# Patient Record
Sex: FEMALE | Race: ASIAN | NOT HISPANIC OR LATINO | Employment: FULL TIME | URBAN - METROPOLITAN AREA
[De-identification: names, ages, dates, MRNs, and addresses within clinical notes are randomized per-mention and may not be internally consistent; named-entity substitution may affect disease eponyms.]

---

## 2018-01-10 ENCOUNTER — TELEPHONE (OUTPATIENT)
Dept: INTERNAL MEDICINE | Facility: CLINIC | Age: 68
End: 2018-01-10

## 2018-01-10 ENCOUNTER — OFFICE VISIT (OUTPATIENT)
Dept: INTERNAL MEDICINE | Facility: CLINIC | Age: 68
End: 2018-01-10
Payer: COMMERCIAL

## 2018-01-10 VITALS
HEIGHT: 61 IN | WEIGHT: 117.63 LBS | DIASTOLIC BLOOD PRESSURE: 62 MMHG | OXYGEN SATURATION: 99 % | TEMPERATURE: 99 F | HEART RATE: 57 BPM | SYSTOLIC BLOOD PRESSURE: 116 MMHG | BODY MASS INDEX: 22.21 KG/M2

## 2018-01-10 DIAGNOSIS — N81.9 FEMALE GENITAL PROLAPSE, UNSPECIFIED TYPE: ICD-10-CM

## 2018-01-10 DIAGNOSIS — R10.32 LLQ PAIN: Primary | ICD-10-CM

## 2018-01-10 DIAGNOSIS — R10.32 LLQ PAIN: ICD-10-CM

## 2018-01-10 DIAGNOSIS — Z00.00 ANNUAL PHYSICAL EXAM: Primary | ICD-10-CM

## 2018-01-10 DIAGNOSIS — M81.0 OSTEOPOROSIS, POST-MENOPAUSAL: ICD-10-CM

## 2018-01-10 LAB
BACTERIA #/AREA URNS AUTO: ABNORMAL /HPF
BILIRUB UR QL STRIP: NEGATIVE
CAOX CRY UR QL COMP ASSIST: ABNORMAL
CLARITY UR REFRACT.AUTO: ABNORMAL
COLOR UR AUTO: YELLOW
GLUCOSE UR QL STRIP: NEGATIVE
HGB UR QL STRIP: NEGATIVE
KETONES UR QL STRIP: ABNORMAL
LEUKOCYTE ESTERASE UR QL STRIP: ABNORMAL
MICROSCOPIC COMMENT: ABNORMAL
NITRITE UR QL STRIP: NEGATIVE
PH UR STRIP: 5 [PH] (ref 5–8)
PROT UR QL STRIP: NEGATIVE
RBC #/AREA URNS AUTO: 1 /HPF (ref 0–4)
SP GR UR STRIP: 1.03 (ref 1–1.03)
SQUAMOUS #/AREA URNS AUTO: 0 /HPF
URN SPEC COLLECT METH UR: ABNORMAL
UROBILINOGEN UR STRIP-ACNC: NEGATIVE EU/DL
WBC #/AREA URNS AUTO: 10 /HPF (ref 0–5)

## 2018-01-10 PROCEDURE — 81001 URINALYSIS AUTO W/SCOPE: CPT

## 2018-01-10 PROCEDURE — 99999 PR PBB SHADOW E&M-EST. PATIENT-LVL IV: CPT | Mod: PBBFAC,,, | Performed by: INTERNAL MEDICINE

## 2018-01-10 PROCEDURE — 99397 PER PM REEVAL EST PAT 65+ YR: CPT | Mod: 25,S$GLB,, | Performed by: INTERNAL MEDICINE

## 2018-01-10 PROCEDURE — 90471 IMMUNIZATION ADMIN: CPT | Mod: S$GLB,,, | Performed by: INTERNAL MEDICINE

## 2018-01-10 PROCEDURE — 90670 PCV13 VACCINE IM: CPT | Mod: S$GLB,,, | Performed by: INTERNAL MEDICINE

## 2018-01-10 RX ORDER — ALENDRONATE SODIUM 70 MG/1
70 TABLET ORAL
Qty: 4 TABLET | Refills: 11 | Status: SHIPPED | OUTPATIENT
Start: 2018-01-10 | End: 2019-01-23 | Stop reason: SDUPTHER

## 2018-01-10 NOTE — PROGRESS NOTES
Subjective:       Patient ID: Luly Lora is a 67 y.o. female.    Chief Complaint: abd pain, other concerns  HPI   C/o pain LLQ beginning more than 6 mo ago.  Not severe.  3/10.  Intermittent.  A couple of times was assoc with fever.  Pain always occurred during weekend and not severe enough to see ED care.  She has had pain less than monthly.  Episodes usually last a day to a few days.  Not assoc with change in BM's.  Tends to have irreg bm's.  Denies constipation.  No n,v, wt loss.no blood in stool.  Last colonoscopy in 2010.      She had recent labs at [facility], as she needed labs for insurance.    intracerebral hemorrhage 1998, etiology unclear.    H/o pelvic  And sacral fracture after a fall while playing [employer] in 2014.   dexa showed osteoporosis in 2016, but she declined meds.   Hip t score -3.3.  Femoral neck  -3.8.  Review of Systems   Constitutional: Positive for fever. Negative for unexpected weight change.   HENT: Negative for congestion and postnasal drip.    Respiratory: Negative for chest tightness, shortness of breath and wheezing.    Cardiovascular: Negative for chest pain and leg swelling.   Gastrointestinal: Positive for abdominal pain. Negative for anal bleeding, constipation, diarrhea, nausea and vomiting.        NO dysphagia.   Genitourinary: Negative for dysuria, frequency, hematuria and urgency.   Musculoskeletal: Negative for arthralgias and myalgias.   Skin: Negative for rash.   Neurological: Negative for headaches.   Psychiatric/Behavioral: Negative for dysphoric mood and sleep disturbance. The patient is not nervous/anxious.        Objective:      Physical Exam   Constitutional: She is oriented to person, place, and time. She appears well-developed and well-nourished. No distress.   HENT:   Head: Normocephalic and atraumatic.   Right Ear: External ear normal.   Left Ear: External ear normal.   Nose: Nose normal.   Mouth/Throat: Oropharynx is clear and moist.   Eyes: Conjunctivae and EOM are  normal. Pupils are equal, round, and reactive to light. Right eye exhibits no discharge. Left eye exhibits no discharge. No scleral icterus.   Neck: Normal range of motion. Neck supple. No JVD present. No thyromegaly present.   Cardiovascular: Normal rate, regular rhythm and normal heart sounds.  Exam reveals no gallop.    No murmur heard.  Pulmonary/Chest: Effort normal and breath sounds normal. No respiratory distress. She has no wheezes. She has no rales.   Abdominal: Soft. Bowel sounds are normal. She exhibits no distension and no mass. There is no tenderness. There is no rebound and no guarding.   Genitourinary: No breast tenderness, discharge or bleeding.   Musculoskeletal: Normal range of motion. She exhibits no edema or tenderness.   Lymphadenopathy:     She has no cervical adenopathy.   Neurological: She is alert and oriented to person, place, and time. No cranial nerve deficit. Coordination normal.   Skin: Skin is warm and dry. No rash noted.   Psychiatric: She has a normal mood and affect. Her behavior is normal. Judgment and thought content normal.         Labs at [facility] 10/17.   chol 200, hdl 60 tg 72, ldl 124.  Glu 85, bun 14,  Cr .62  egfr 95  Na 142  l 4.2cl 105  Ab 4.2liver enzymes normal  Cbc nl  hgm 13.1, hct 39.3plt 198  tsh 3.44  Assessment:       1. Annual physical exam    2. LLQ pain    3. Osteoporosis, post-menopausal    4. Female genital prolapse, unspecified type        Plan:       Luly was seen today for abdominal pain and annual exam.    Diagnoses and all orders for this visit:    Annual physical exam  -     Hepatitis C antibody; Future  -     Creatinine, serum; Future    LLQ pain  -     CT Abdomen Pelvis W Wo Contrast; Future  -     Urinalysis; Future    Osteoporosis, post-menopausal  -     Vitamin D; Future    Female genital prolapse, unspecified type  -     Ambulatory consult to Urogynecology    Other orders  -     (In Office Administered) Pneumococcal Conjugate Vaccine (13 Valent)  (IM)  -     BEGIN alendronate (FOSAMAX) 70 MG tablet; Take 1 tablet (70 mg total) by mouth every 7 days.       She defers MG til October.

## 2018-01-10 NOTE — PATIENT INSTRUCTIONS
Recommended therapies are daily weight bearing exercise,  Calcium 600 mg twice a day, and Vit D 5000 iu daily.

## 2018-01-11 ENCOUNTER — LAB VISIT (OUTPATIENT)
Dept: LAB | Facility: HOSPITAL | Age: 68
End: 2018-01-11
Attending: INTERNAL MEDICINE
Payer: COMMERCIAL

## 2018-01-11 DIAGNOSIS — Z00.00 ANNUAL PHYSICAL EXAM: ICD-10-CM

## 2018-01-11 DIAGNOSIS — M81.0 OSTEOPOROSIS, POST-MENOPAUSAL: ICD-10-CM

## 2018-01-11 LAB
25(OH)D3+25(OH)D2 SERPL-MCNC: 75 NG/ML
CREAT SERPL-MCNC: 0.7 MG/DL
EST. GFR  (AFRICAN AMERICAN): >60 ML/MIN/1.73 M^2
EST. GFR  (NON AFRICAN AMERICAN): >60 ML/MIN/1.73 M^2
HCV AB SERPL QL IA: NEGATIVE

## 2018-01-11 PROCEDURE — 86803 HEPATITIS C AB TEST: CPT

## 2018-01-11 PROCEDURE — 36415 COLL VENOUS BLD VENIPUNCTURE: CPT

## 2018-01-11 PROCEDURE — 82306 VITAMIN D 25 HYDROXY: CPT

## 2018-01-11 PROCEDURE — 82565 ASSAY OF CREATININE: CPT

## 2018-01-12 ENCOUNTER — HOSPITAL ENCOUNTER (OUTPATIENT)
Dept: RADIOLOGY | Facility: HOSPITAL | Age: 68
Discharge: HOME | End: 2018-01-12
Attending: INTERNAL MEDICINE
Payer: COMMERCIAL

## 2018-01-12 DIAGNOSIS — R10.32 LLQ PAIN: ICD-10-CM

## 2018-01-12 PROCEDURE — 74177 CT ABD & PELVIS W/CONTRAST: CPT | Mod: 26,,, | Performed by: RADIOLOGY

## 2018-01-12 PROCEDURE — 25500020 PHARM REV CODE 255: Performed by: INTERNAL MEDICINE

## 2018-01-12 PROCEDURE — 74177 CT ABD & PELVIS W/CONTRAST: CPT | Mod: TC

## 2018-01-12 RX ADMIN — IOHEXOL 75 ML: 350 INJECTION, SOLUTION INTRAVENOUS at 07:01

## 2018-01-15 DIAGNOSIS — R93.89 ABNORMAL CT SCAN: Primary | ICD-10-CM

## 2018-01-16 ENCOUNTER — TELEPHONE (OUTPATIENT)
Dept: INTERNAL MEDICINE | Facility: CLINIC | Age: 68
End: 2018-01-16

## 2018-01-23 ENCOUNTER — INITIAL CONSULT (OUTPATIENT)
Dept: UROGYNECOLOGY | Facility: CLINIC | Age: 68
End: 2018-01-23
Payer: COMMERCIAL

## 2018-01-23 VITALS
WEIGHT: 116.63 LBS | HEIGHT: 61 IN | BODY MASS INDEX: 22.02 KG/M2 | DIASTOLIC BLOOD PRESSURE: 60 MMHG | SYSTOLIC BLOOD PRESSURE: 110 MMHG

## 2018-01-23 DIAGNOSIS — N39.43 POST-VOID DRIBBLING: ICD-10-CM

## 2018-01-23 DIAGNOSIS — N95.2 VAGINAL ATROPHY: ICD-10-CM

## 2018-01-23 DIAGNOSIS — N81.4 UTEROVAGINAL PROLAPSE: ICD-10-CM

## 2018-01-23 DIAGNOSIS — R39.15 URINARY URGENCY: ICD-10-CM

## 2018-01-23 DIAGNOSIS — R33.9 INCOMPLETE EMPTYING OF BLADDER: ICD-10-CM

## 2018-01-23 DIAGNOSIS — N81.4 UTERINE PROLAPSE: Primary | ICD-10-CM

## 2018-01-23 PROCEDURE — 99213 OFFICE O/P EST LOW 20 MIN: CPT | Performed by: OBSTETRICS & GYNECOLOGY

## 2018-01-23 PROCEDURE — 3008F BODY MASS INDEX DOCD: CPT | Mod: S$GLB,,, | Performed by: OBSTETRICS & GYNECOLOGY

## 2018-01-23 PROCEDURE — 57160 INSERT PESSARY/OTHER DEVICE: CPT | Mod: ,,, | Performed by: OBSTETRICS & GYNECOLOGY

## 2018-01-23 PROCEDURE — 87086 URINE CULTURE/COLONY COUNT: CPT

## 2018-01-23 PROCEDURE — 99215 OFFICE O/P EST HI 40 MIN: CPT | Mod: 25,,, | Performed by: OBSTETRICS & GYNECOLOGY

## 2018-01-23 RX ORDER — ACETAMINOPHEN 500 MG
5000 TABLET ORAL DAILY
COMMUNITY

## 2018-01-23 NOTE — PROGRESS NOTES
[facility] - GYNECOLOGY  [address]  [place] [address]    Luly Lora  [id number]  1950 February 8, 2018    Consulting Physician: Elma Copeland MD   GYN: none  Primary M.D.: Elma Copeland MD    Chief Complaint   Patient presents with    Vaginal Prolapse    Abdominal Pain    Urinary Frequency     pt states she urinates every hour       HPI:   Ohs Peq [id number]     Question 1/21/2018  8:07 PM CST   Do you...    Usually experience pressure in the lower abdomen? Symptoms not present   Usually experience heaviness or dullness in the pelvic area? Symptoms not present   Usually have a bulge or something falling out that you can see or feel in your vaginal area? Symptoms present and they bother me quite a bit   Ever have to push on the vagina or around the rectum to complete a bowel movement? Symptoms not present   Usually experience a feeling of incomplete bladder emptying? Symptoms present and they bother me somewhat   Ever have to push up on a bulge in the vaginal area with your fingers to start or complete urination? Symptoms not present   Do you...    Feel you need to strain too hard to have a bowel movement? Symptoms not present   Feel you have not completely emptied your bowels at the end of a bowel movement?  Symptoms present and they bother me somewhat   Usually lose stool beyond your control if your stool is well formed? Symptoms not present   Usually lose stool beyond your control if your stool is loose? Symptoms present and they bother me somewhat   Usually lose gas from your rectum beyond your control? Symptoms not present   Usually have pain when you pass your stool? Symptoms not present   Experience a strong sense of urgency and have to rush to the bathroom to have a bowel movement? Symptoms not present   Does part of your bowel ever pass through the rectum and bulge outside during or after a bowel movement? Symptoms present and they bother me somewhat   Do you...     Usually experience  frequent urination? Symptoms present and they bother me somewhat   Usually experience urine leakage associated with a feeling of urgency, that is, a strong sensation of needing to go to the bathroom? Symptoms present and they bother me somewhat   Usually experience urine leakage related to coughing, sneezing or laughing? Symptoms not present   Usually experience small amounts of urine leakage (that is, drops)? Symptoms present and they bother me somewhat   Usually experience difficulty emptying your bladder? Symptoms present and they bother me somewhat   Usually experience pain or discomfort in the lower abdomen or genital region? Symptoms present and they bother me somewhat   POPDI  (range: 0 - 100) 25   CRADI (range: 0 - 100) 18.75   IGNACIO (range: 0 - 100) 41.66   TOTAL SCORE  (range: 0 - 300) 85.41   Ohs Peq Urogyn Hpi     Question 1/21/2018  8:21 PM CST   General Urogynecology: Are you experiencing the following?    Dysuria (painful urination) No   Nocturia:  waking up at night to empty your bladder  Yes   If you answered yes to the previous question, how many times does this happen per night? 1-2   Enuresis (urine loss during sleep) No   Dribbling urine after you urinate Yes   Hematuria (urine appears red) No   Type of stream Interrupted   Urinary frequency: How often a day are you going to the bathroom per day?  Less than 10   Urinary Tract Infections: How many Urinary Tract Infections have you had in the past year? I have not had a UTI in the past year   If you have had a UTI in the past year, what treatments have you had so far?  I have not had a UTI in the past year   Urinary Incontinence (General): Are you experiencing the following?    Past consultation for incontinence: Have you ever seen someone for the evaluation of incontinence? No   If you answered yes to the previous question, please select all the therapies you have tried.  N/a- I answered no to the previous question   Please note the effectiveness  "of the therapies.    Need to wear protection to keep clothes dry  No   If you answered yes to the previous question, please kuldeep the protection you use.  N/a- I answered no to the previous question   If you wear protection, how much wetness is typically on each pad? N/a- I do not wear protection   If you wear protection, how often do you have to change per day, if applicable?     Stress Symptoms: Are you experiencing the following?    Leakage of urine with cough, laugh and/or sneeze No   If you answered yes to the previous question, what is the frequency in days, weeks and/or months? Never   Leakage of urine with sex No   Leakage of urine with bending/ lifting No   Leakage of urine with briskly walking or jogging No   If you lose urine for any other reason not previously mentioned, please note it below, if applicable.     Urge Symptoms: Are you experiencing the following?    Urgency ("got to go" feeling) Yes   Urge: How frequently do you feel an urge to urinate (feeling like you "gotta go" to the bathroom and can't wait) Several times a week   Do you experience a leakage of urine when you have a feeling of urgency?  No   Leakage of urine when unaware No   Past use of anticholinergics (medications used to treat overactive bladder) No   If you answered yes to the previous question, please kuldeep the anticholinergics you have used:     Have you ever used Mirbetriq (aka Mirabegron)?  No   Prolapse Symptoms: Are you experiencing any of the following?     Falling out/ Bulging/ Heaviness in the vagina (past opening) x years; wears tight underwear Yes   Vaginal/ Abdominal Pain/ Pressure Yes   Need to strain/ Push to void No   Need to wait on the toilet before you void No   Unusual position to urinate (using your hands to push back the vaginal bulge) No   Sensation of incomplete emptying Yes--has to PV/DV to help   Past use of pessary device No   If you answered yes to the previous question, please list the devices you have " used below.     Bowel Symptoms: Are you experiencing any of the following?    Constipation No   Diarrhea  No   Hematochezia (bloody stool) No   Incomplete evacuation of stool Yes   Involuntary loss of formed stool No   Fecal smearing/urgency Yes   Involuntary loss of gas No   Vaginal Symptoms: Are you experiencing any of the following?     Abnormal vaginal bleeding  No   Vaginal dryness Yes   Sexually active  Yes   Dyspareunia (painful intercourse) Yes   Estrogen use  No   Ohs Peq Pelvic Pain Urogyn     Question 2018  8:22 PM CST   Are you experiencing pelvic pain?  No      Patient Hx        Past Medical History  Past Medical History:   Diagnosis Date    Brain bleed     Osteoporosis     Pelvic fracture     Stroke     intracerebral hemorrhage , unclear cause.  evaluated at [facility]   CVA: no residual issues    Past Surgical History  Past Surgical History:   Procedure Laterality Date    EYE SURGERY      ingrown eye lashes BL    WISDOM TOOTH EXTRACTION        Hysterectomy: No    Past Ob History     x 1.  C/s x 0.    Largest infant weight::  3250g.    no FAVD. yes episiotomy.      Gynecologic History  LMP: No LMP recorded. Patient is postmenopausal.  Age of menarche: 12 yo  Age of menopause: 56 yo.   Menstrual history: h/o normal  Pap test: , normal.  History of abnormal paps: No.  History of STIs:  No  Mammogram: Date of last: 10/16.  Result: Normal  Colonoscopy: Date of last: .  Result:  normal.  Repeat due:  .    DEXA:  Date of last: .  Result:  Osteoporosis, on meds.  Repeat due:  .     Family History  Family History   Problem Relation Age of Onset    Hypertension Mother     Heart disease Mother     Kidney failure Mother     Diabetes Mother     Parkinsonism Father     Hypertension Brother     No Known Problems Son     Hypertension Brother     Diabetes Brother     Breast cancer Neg Hx     Ovarian cancer Neg Hx     Cervical cancer Neg Hx     Endometrial  "cancer Neg Hx     Vaginal cancer Neg Hx     Asthma Neg Hx     Emphysema Neg Hx       Colon CA: No  Breast CA: No  GYN CA: No   CA: No    Social History  History   Smoking Status    Never Smoker   Smokeless Tobacco    Never Used     History   Alcohol Use    0.6 oz/week    1 Glasses of wine per week     Comment: occasionally   .    History   Drug Use No     The patient is .  Resides in [place] [address].  Employment status: currently employed at [employer]; teaching lab--runs.      Allergies  Review of patient's allergies indicates:  No Known Allergies    Medications  Current Outpatient Prescriptions on File Prior to Visit   Medication Sig Dispense Refill    alendronate (FOSAMAX) 70 MG tablet Take 1 tablet (70 mg total) by mouth every 7 days. 4 tablet 11     No current facility-administered medications on file prior to visit.        Review of Systems A 14 point ROS was reviewed with pertinent positives as noted above in the history of present illness.      Constitutional: negative  Eyes: negative  Endocrine: negative  Gastrointestinal: negative  Cardiovascular: negative  Respiratory: negative  Allergic/Immunologic: negative  Integumentary: negative  Psychiatric: negative  Musculoskeletal: negative   Ear/Nose/Throat: negative  Neurologic: negative  Genitourinary: SEE HPI  Hematologic/Lymphatic: negative   Breast: negative    Urogynecologic Exam  /60   Ht 5' 1" (1.549 m)   Wt 52.9 kg (116 lb 10 oz)   BMI 22.04 kg/m²     GENERAL APPEARANCE:  The patient is well-developed, well-nourished.   Neck:  Supple with no thyromegaly, no carotid bruits.  Heart:  Regular rate and rhythm, no murmurs, rubs or gallops.  Lungs:  Clear.  No CVA tenderness.  Abdomen:  Soft, nontender, nondistended, no hepatosplenomegaly.  Incisions:  none    PELVIC:    External genitalia:  Normal Bartholins, Skenes and labia bilaterally.    Urethra:  No caruncle, diverticulum or masses.  (+) hypermobility.    Vagina:  " Atrophy (+) , no bladder masses or tender, no discharge.    Cervix:  normal appearance  Uterus: normal  Adnexa: Not palpable.    POP-Q:  Aa 0; Ba 0; C +2; Ap 0; Bp 0; D -6.  Genital hiatus 3, perineal body 2, total vaginal length 11-12.    NEUROLOGIC:  Cranial nerves 2 through 12 intact.  Strength 5/5.  DTRs 2+ lower extremities.  S2 through 4 normal.  Sacral reflexes intact.    EXT: LOPEZ, 2+ pulses bilaterally, no C/C/E    COUGH STRESS TEST:  negative  KEGEL: 1 /5    RECTAL:    External:  Normal, (--) hemorrhoids, (--) dovetailing.   Internal: deferred    PVR: 120 mL    The patient was fit with #3 ring + support pessary.  She was able to tolerate the device comfortabley with bending, squatting, valsalva.  She was able to demonstrate independent removal and placement.  She tolerated the procedure well.      Impression    1. Uterine prolapse    2. Urinary urgency    3. Vaginal atrophy    4. Uterovaginal prolapse    5. Post-void dribbling    6. Incomplete emptying of bladder        Initial Plan  The patient was counseled regarding these issues. The patient was given a summary sheet containing each of these issues with possible options for evaluation and management. When appropriate, we also reviewed computer-generated diagrams specific to their diagnoses..  All questions were addressed to the patient's satisfaction.    1)  Stage 3 uterine prolapse:  --trial of pessary: #3 ring + support; get from [facility] outpatient pharmacy    PESSARY CARE: Remove pessary as frequently as nightly and as infrequently as every 2-3 weeks.  Remove before intercourse.  May need to remove before bowel movements if straining dislodges.   Wash with soap and water (no ).  Replace using small amount of water-based lubricant (like KY jelly) at end being introduced into vagina.      --surgical option: vaginal hysterectomy, uterosacral suspension, anterior/posterior repair  --would need ultrasound/bladder testing beforehand  --recheck PVR  on RTC with pessary    2)  Vaginal atrophy (dryness):    Use REPLENS or REFRESH OTC: 1/2 applicator full in vagina twice a week.      3)  Urinary urgency/post-void dribbling:  --trial of pessary   --Empty bladder every 3 hours.  Empty well: wait a minute, lean forward on toilet.    --Avoid dietary irritants (see sheet).  Keep diary x 3-5 days to determine your irritants.  --KEGELS: do 10 in AM and 10 in PM, holding each x 10 seconds.  When you feel urge to go, STOP, KEGEL, and when urge has passed, then go to bathroom.  Consider PT in future.    --URGE: consider medication in future.  Takes 2-4 weeks to see if will have effect.  For dry mouth: get sour, sugar free lozenge or gum.      4) elevated PVR:  --suspect due to prolapse  --recheck PVR on RTC with pessary    5)  RTC 6 weeks with NP for pessary check/PVR.      Approximately 45 min were spent in consult, 90 % in discussion.     Thank you for requesting consultation of your patient.  I look forward to participating in their care.    Shon Carlson  Female Pelvic Medicine and Reconstructive Surgery  [facility] [place], [place]

## 2018-01-23 NOTE — LETTER
January 31, 2018      Elma Copeland MD  [address]  [place] [address]           [facility] - Gynecology  [address]  [place] [address]  Phone: [phone]          Patient: Luly Lora   MR Number: [id number]   YOB: 1950   Date of Visit: 1/23/2018       Dear Dr. Elma Copeland:    Thank you for referring Luly Lora to me for evaluation. Attached you will find relevant portions of my assessment and plan of care.    If you have questions, please do not hesitate to call me. I look forward to following Luly Lora along with you.    Sincerely,    Shon Carlson MD    Enclosure  CC:  No Recipients    If you would like to receive this communication electronically, please contact [email or web address] or [phone] to request more information on [employer] Link access.    For providers and/or their staff who would like to refer a patient to [facility], please contact us through our one-stop-shop provider referral line, [facility] , at [phone].    If you feel you have received this communication in error or would no longer like to receive these types of communications, please e-mail [email or web address]

## 2018-01-23 NOTE — PATIENT INSTRUCTIONS
1)  Stage 3 uterine prolapse:  --trial of pessary: #3 ring + support; get from [facility] outpatient pharmacy    PESSARY CARE: Remove pessary as frequently as nightly and as infrequently as every 2-3 weeks.  Remove before intercourse.  May need to remove before bowel movements if straining dislodges.   Wash with soap and water (no ).  Replace using small amount of water-based lubricant (like KY jelly) at end being introduced into vagina.      --surgical option: vaginal hysterectomy, uterosacral suspension, anterior/posterior repair  --would need ultrasound/bladder testing beforehand  --recheck PVR on RTC with pessary    2)  Vaginal atrophy (dryness):    Use REPLENS or REFRESH OTC: 1/2 applicator full in vagina twice a week.      3)  Urinary urgency/post-void dribbling:  --trial of pessary   --Empty bladder every 3 hours.  Empty well: wait a minute, lean forward on toilet.    --Avoid dietary irritants (see sheet).  Keep diary x 3-5 days to determine your irritants.  --KEGELS: do 10 in AM and 10 in PM, holding each x 10 seconds.  When you feel urge to go, STOP, KEGEL, and when urge has passed, then go to bathroom.  Consider PT in future.    --URGE: consider medication in future.  Takes 2-4 weeks to see if will have effect.  For dry mouth: get sour, sugar free lozenge or gum.      4) elevated PVR:  --suspect due to prolapse  --recheck PVR on RTC with pessary    5)  RTC 6 weeks with NP for pessary check/PVR.    ----------------------------------------------    Bladder Irritants  Certain foods and drinks have been associated with worsening symptoms of urinary frequency, urgency, urge incontinence, or bladder pain. If you suffer from any of these conditions, you may wish to try eliminating one or more of these foods from your diet and see if your symptoms improve. If bladder symptoms are related to dietary factors, strict adherence to a diet thateliminates the food should bring marked relief in 10 days. Once you  are feeling better, you can begin to add foods back into your diet, one at a time. If symptoms return, you will be able to identify the irritant. As you add foods back to your diet it is very important that you drink significant amounts of water.    -----------------------------------------------------------------------------------------------  List of Common Bladder Irritants*  Alcoholic beverages  Apples and apple juice  Cantaloupe  Carbonated beverages  Chili and spicy foods  Chocolate  Citrus fruit  Coffee (including decaffeinated)  Cranberries and cranberry juice  Grapes  Guava  Milk Products: milk, cheese, cottage cheese, yogurt, ice cream  Peaches  Pineapple  Plums  Strawberries  Sugar especially artificial sweeteners, saccharin, aspartame, corn sweeteners, honey, fructose, sucrose, lactose  Tea  Tomatoes and tomato juice  Vitamin B complex  Vinegar  *Most people are not sensitive to ALL of these products; your goal is to find the foods that make YOUR symptoms worse.  ---------------------------------------------------------------------------------------------------    Low-acid fruit substitutions include apricots, papaya, pears and watermelon. Coffee drinkers can drink Kava or other lowacid instant drinks. Tea drinkers can substitute non-citrus herbal and sun brewed teas. Calcium carbonate co-buffered with calcium ascorbate can be substituted for Vitamin C. Prelief is a dietary supplement that works as an acid blocker for the bladder.    Where to get more information:        Overcoming Bladder Disorders by Latrice Davey and Deisy Shelton, 1990        You Dont Have to Live with Cystitis! By Shawnee Epstein, 1988  · http://www.urologymanagement.org/oab

## 2018-01-24 LAB — BACTERIA UR CULT: NO GROWTH

## 2018-01-25 ENCOUNTER — TELEPHONE (OUTPATIENT)
Dept: UROGYNECOLOGY | Facility: CLINIC | Age: 68
End: 2018-01-25

## 2018-01-25 NOTE — TELEPHONE ENCOUNTER
Please let patient know that I apologize that the [facility] pharmacy is not keeping that pessary in stock.  They were to supposed to have been, but maybe they ran out?  They should have ordered it for her, though, and should call her when it arrives.  Please let me know if this didn't happen.  I will also contact the pharmacy to make sure that they have them in stock in the future. Please apologize for any inconvenience she experienced.  Thanks!

## 2018-01-25 NOTE — TELEPHONE ENCOUNTER
DARELLI,    Pt stated the pharmacy did not have the pessary so she left the rx with them so they could order it. Informed pt I'll relay this message to Dr Carlson. Pt voiced understanding and call ended.

## 2018-01-25 NOTE — TELEPHONE ENCOUNTER
----- Message from Francy Salbador sent at 1/25/2018  3:01 PM CST -----  Contact: DINORA HUERTA [[id number]]  _x  1st Request  _  2nd Request  _  3rd Request        Who: DINORA HUERTA [[id number]]    Why: Patient states she would like a call back to discuss her pessary not being at the pharmacy.     What Number to Call Back: [phone]    When to Expect a call back: (Before the end of the day)   -- if call after 3:00 call back will be tomorrow.

## 2018-01-26 ENCOUNTER — TELEPHONE (OUTPATIENT)
Dept: UROGYNECOLOGY | Facility: CLINIC | Age: 68
End: 2018-01-26

## 2018-01-26 NOTE — TELEPHONE ENCOUNTER
----- Message from Shon Carlson MD sent at 1/25/2018  5:40 PM CST -----  Please let the patient know urine C&S was negative for infection.  Thanks!

## 2018-01-26 NOTE — TELEPHONE ENCOUNTER
Called pt and relayed message that we apologize that the [facility] pharmacy is not keeping that pessary in stock.  They were to supposed to have been, but maybe they ran out?  They should have ordered it for her, though, and should call her when it arrives.  Please let me know if this didn't happen.  I will also contact the pharmacy to make sure that they have them in stock in the future. Please apologize for any inconvenience she experienced.  Pt voiced understanding and call was ended.

## 2018-01-26 NOTE — TELEPHONE ENCOUNTER
Spoke with and relayed urine culture negative for infection, pt voiced understanding and call was ended.

## 2018-01-31 ENCOUNTER — OFFICE VISIT (OUTPATIENT)
Dept: PULMONOLOGY | Facility: CLINIC | Age: 68
End: 2018-01-31
Payer: COMMERCIAL

## 2018-01-31 VITALS
OXYGEN SATURATION: 98 % | DIASTOLIC BLOOD PRESSURE: 62 MMHG | WEIGHT: 118.19 LBS | SYSTOLIC BLOOD PRESSURE: 102 MMHG | RESPIRATION RATE: 12 BRPM | HEART RATE: 61 BPM | BODY MASS INDEX: 22.31 KG/M2 | HEIGHT: 61 IN

## 2018-01-31 DIAGNOSIS — J84.10 POSTINFLAMMATORY PULMONARY FIBROSIS: ICD-10-CM

## 2018-01-31 DIAGNOSIS — Z92.29 HISTORY OF BCG VACCINATION: ICD-10-CM

## 2018-01-31 DIAGNOSIS — J18.9 PNEUMONIA OF BOTH LOWER LOBES DUE TO INFECTIOUS ORGANISM: ICD-10-CM

## 2018-01-31 PROCEDURE — 99999 PR PBB SHADOW E&M-EST. PATIENT-LVL III: CPT | Mod: PBBFAC,,, | Performed by: INTERNAL MEDICINE

## 2018-01-31 PROCEDURE — 3008F BODY MASS INDEX DOCD: CPT | Mod: S$GLB,,, | Performed by: INTERNAL MEDICINE

## 2018-01-31 PROCEDURE — 99204 OFFICE O/P NEW MOD 45 MIN: CPT | Mod: S$GLB,,, | Performed by: INTERNAL MEDICINE

## 2018-01-31 NOTE — PATIENT INSTRUCTIONS
CBC, CMP, ESR, and CRP.  Spirometry and DLCO.  CT Chest.  Phone review, and decide on any further investigation   (sputum studies for AFB).

## 2018-01-31 NOTE — LETTER
January 31, 2018      Elma Copeland MD  [address]  [place] [address]           [facility]  [address]  [place] [address]  Phone: [phone]          Patient: Luly Lora   MR Number: [id number]   YOB: 1950   Date of Visit: 1/31/2018       Dear Dr. Elma Copeland:    Thank you for referring Luly Lora to me for evaluation. Attached you will find relevant portions of my assessment and plan of care.    If you have questions, please do not hesitate to call me. I look forward to following Luly Lora along with you.    Sincerely,    MARYCHUY Crews MD    Enclosure  CC:  No Recipients    If you would like to receive this communication electronically, please contact [email or web address] or [phone] to request more information on [employer] Link access.    For providers and/or their staff who would like to refer a patient to [facility], please contact us through our one-stop-shop provider referral line, [facility] , at [phone].    If you feel you have received this communication in error or would no longer like to receive these types of communications, please e-mail [email or web address]

## 2018-01-31 NOTE — PROGRESS NOTES
Subjective:       Patient ID: Luly Lora is a 67 y.o. female.    Chief Complaint: Abnormal Ct Scan    HPI Mrs. Lora is a 67-year-old nonsmoker who comes to follow up recent abnormalities   seen in a CT scan of the abdomen.  This study was done to investigate   intermittent abdominal pain.  The images through the lung bases were noted to   show areas of fibronodular abnormality.  Unfortunately, there are no previous   thoracic images available for comparison.    Mrs. Lora has not had any ongoing respiratory symptoms to correlate with the CT   findings.  She is not having fever, night sweats, or unexplained weight loss.    She does report a modest cough and intermittent mucus production.  She has not   had any wheezing or hemoptysis.    Mrs. Lora immigrated to this country from [place] in 1988.  She believes that she   was vaccinated for tuberculosis during childhood (? BCG).  As a result, she has    always done chest x-rays as an alternative to skin testing when being screened   for mycobacterial disease in the past.  She believes her chest x-ray has always been   normal.    Mrs. Lora is a lifelong nonsmoker.  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  She believes that her family history is   negative for lung diseases.    DATA:  I have reviewed the images from the CT scan of the abdomen done earlier   this month.  The cardiac silhouette is not enlarged.  There are bilateral   fibrotic and nodular abnormalities near the lung bases.  The most substantial   abnormalities appear to be in the right middle lobe and lingula.  There are no   obvious pleural abnormalities.  There are no previous images available   for comparison.      CB/IN  dd: 01/31/2018 19:29:49 (CST)  td: 02/01/2018 14:03:27 (CST)  Doc ID   #[id number]  Job ID #[id number]    CC:       Review of Systems   Constitutional: Negative for fever and fatigue.   HENT: Negative for postnasal drip, sinus pressure, voice change and congestion.     Respiratory: Positive for cough and sputum production. Negative for shortness of breath, wheezing and dyspnea on extertion.    Cardiovascular: Negative for chest pain and leg swelling.   Genitourinary: Negative for difficulty urinating.   Musculoskeletal: Negative for arthralgias and back pain.   Skin: Negative for rash.   Gastrointestinal: Negative for abdominal pain and acid reflux.   Neurological: Negative for dizziness and weakness.   Hematological: Negative for adenopathy.       Objective:      Physical Exam   Constitutional: She is oriented to person, place, and time. She appears well-developed and well-nourished.   HENT:   Head: Normocephalic.   Nose: Nose normal.   Mouth/Throat: No oropharyngeal exudate.   Neck: Normal range of motion. No JVD present. No tracheal deviation present. No thyromegaly present.   Cardiovascular: Normal rate, regular rhythm and normal heart sounds.    No murmur heard.  Pulmonary/Chest: Normal expansion. No stridor. She has no wheezes. She has no rhonchi. She has no rales. She exhibits no tenderness.   Abdominal: Soft. There is no tenderness.   Musculoskeletal: She exhibits no edema.   Lymphadenopathy:     She has no cervical adenopathy.   Neurological: She is alert and oriented to person, place, and time.   Skin: Skin is warm and dry. No rash noted. No erythema. Nails show no clubbing.   Psychiatric: She has a normal mood and affect.   Vitals reviewed.    Personal Diagnostic Review    No flowsheet data found.      Assessment:       1. Postinflammatory pulmonary fibrosis    2. Pneumonia of both lower lobes due to infectious organism    3. History of BCG vaccination        Outpatient Encounter Prescriptions as of 1/31/2018   Medication Sig Dispense Refill    alendronate (FOSAMAX) 70 MG tablet Take 1 tablet (70 mg total) by mouth every 7 days. 4 tablet 11    CALCIUM CARBONATE (CALCIUM 600 ORAL) Take by mouth.      cholecalciferol, vitamin D3, (VITAMIN D3) 5,000 unit Tab Take  5,000 Units by mouth once daily.      VIT A/VIT C/VIT E/ZINC/COPPER (ICAPS AREDS ORAL) Take by mouth.       No facility-administered encounter medications on file as of 1/31/2018.      Orders Placed This Encounter   Procedures    CT Chest Without Contrast     Standing Status:   Future     Standing Expiration Date:   1/31/2019    CBC auto differential     Standing Status:   Future     Number of Occurrences:   1     Standing Expiration Date:   1/31/2019    Comprehensive metabolic panel     Standing Status:   Future     Number of Occurrences:   1     Standing Expiration Date:   1/31/2019    Sedimentation rate, manual     Standing Status:   Future     Number of Occurrences:   1     Standing Expiration Date:   1/31/2019    C-reactive protein     Standing Status:   Future     Number of Occurrences:   1     Standing Expiration Date:   1/31/2019    Spirometry without bronchodilator     Standing Status:   Future     Standing Expiration Date:   1/31/2019    DLCO-Carbon Monoxide Diffusing Capacity     Standing Status:   Future     Standing Expiration Date:   1/31/2019     Plan:    CBC, CMP, ESR, and CRP.  Spirometry and DLCO.  CT Chest.  Phone review, and decide on any further investigation   (sputum studies for AFB).

## 2018-02-08 PROBLEM — N81.4 UTERINE PROLAPSE: Status: ACTIVE | Noted: 2018-01-10

## 2018-02-08 PROBLEM — N95.2 VAGINAL ATROPHY: Status: ACTIVE | Noted: 2018-02-08

## 2018-02-08 PROBLEM — N39.43 POST-VOID DRIBBLING: Status: ACTIVE | Noted: 2018-02-08

## 2018-02-08 PROBLEM — R33.9 INCOMPLETE EMPTYING OF BLADDER: Status: ACTIVE | Noted: 2018-02-08

## 2018-02-08 PROBLEM — R39.15 URINARY URGENCY: Status: ACTIVE | Noted: 2018-02-08

## 2018-02-14 ENCOUNTER — OFFICE VISIT (OUTPATIENT)
Dept: PULMONOLOGY | Facility: CLINIC | Age: 68
End: 2018-02-14
Payer: COMMERCIAL

## 2018-02-14 ENCOUNTER — HOSPITAL ENCOUNTER (OUTPATIENT)
Dept: RADIOLOGY | Facility: HOSPITAL | Age: 68
Discharge: HOME | End: 2018-02-14
Attending: INTERNAL MEDICINE
Payer: COMMERCIAL

## 2018-02-14 ENCOUNTER — HOSPITAL ENCOUNTER (OUTPATIENT)
Dept: PULMONOLOGY | Facility: CLINIC | Age: 68
Discharge: HOME | End: 2018-02-14
Payer: COMMERCIAL

## 2018-02-14 VITALS
SYSTOLIC BLOOD PRESSURE: 100 MMHG | WEIGHT: 119 LBS | DIASTOLIC BLOOD PRESSURE: 60 MMHG | BODY MASS INDEX: 22.47 KG/M2 | HEART RATE: 59 BPM | HEIGHT: 61 IN | RESPIRATION RATE: 12 BRPM | OXYGEN SATURATION: 95 %

## 2018-02-14 DIAGNOSIS — Z92.29 HISTORY OF BCG VACCINATION: ICD-10-CM

## 2018-02-14 DIAGNOSIS — J18.9 PNEUMONIA OF BOTH LOWER LOBES DUE TO INFECTIOUS ORGANISM: ICD-10-CM

## 2018-02-14 DIAGNOSIS — J84.10 POSTINFLAMMATORY PULMONARY FIBROSIS: Primary | ICD-10-CM

## 2018-02-14 DIAGNOSIS — J84.10 POSTINFLAMMATORY PULMONARY FIBROSIS: ICD-10-CM

## 2018-02-14 LAB
PRE FEV1 FVC: 81
PRE FEV1: 2.2
PRE FVC: 2.7
PREDICTED FEV1 FVC: 81
PREDICTED FEV1: 2.08
PREDICTED FVC: 2.61

## 2018-02-14 PROCEDURE — 71250 CT THORAX DX C-: CPT | Mod: TC

## 2018-02-14 PROCEDURE — 71250 CT THORAX DX C-: CPT | Mod: 26,,, | Performed by: RADIOLOGY

## 2018-02-14 PROCEDURE — 99214 OFFICE O/P EST MOD 30 MIN: CPT | Mod: 25,S$GLB,, | Performed by: INTERNAL MEDICINE

## 2018-02-14 PROCEDURE — 94729 DIFFUSING CAPACITY: CPT | Mod: S$GLB,,, | Performed by: INTERNAL MEDICINE

## 2018-02-14 PROCEDURE — 94010 BREATHING CAPACITY TEST: CPT | Mod: S$GLB,,, | Performed by: INTERNAL MEDICINE

## 2018-02-14 PROCEDURE — 3008F BODY MASS INDEX DOCD: CPT | Mod: S$GLB,,, | Performed by: INTERNAL MEDICINE

## 2018-02-14 PROCEDURE — 99999 PR PBB SHADOW E&M-EST. PATIENT-LVL III: CPT | Mod: PBBFAC,,, | Performed by: INTERNAL MEDICINE

## 2018-02-15 NOTE — PATIENT INSTRUCTIONS
Discussed CT findings and possible indolent lung infection (?FAYE), and indications for possible treatment.  In absence of active clinical symptoms benefit of further investigation/treatment is unclear.  Will plan to monitor with return visit in 3 months with repeat CBC, ESR, CRP, Spirometry, and DLCO.  Call/return sooner if new resp symptoms arise.

## 2018-02-15 NOTE — PROGRESS NOTES
Subjective:       Patient ID: Luly Lora is a 67 y.o. female.    Chief Complaint: Results    HPI REVIEW VISIT.    HISTORY OF PRESENT ILLNESS:  Ms. Lora returns to review the results from recent   studies done to investigate abnormalities seen in a recent CT scan.  This   was an abdominal study which revealed the incidental finding of fibronodular   abnormalities within the lung bases.    Today, Ms. Lora reports that she continues to feel well.  She is not having any   ongoing respiratory symptoms.  She believes that she may have had one or two   episodes of a low-grade fever lasting no more than a day during the past several   months.  Otherwise, she does not recognize any symptoms which might be   associated with a chronic infection.    Laboratory studies done at the time of her previous visit include a CBC,   sedimentation rate, C-reactive protein, and a chemistry profile.  These do not   show any significant abnormal findings.    Pulmonary function studies done today show the forced vital capacity is 2.70 L,   which is 104% of the expected value.  The FEV1 is 2.20 L, or 106% of predicted.    The ratio of these values is 81%.  The diffusion capacity is 13.8, which is 79%   of the expected value.  No previous studies are available for comparison.    I have reviewed the images from the CT scan of the chest done earlier today.    The cardiac silhouette is not enlarged.  There are no acute cardiovascular   abnormalities.  There are areas of tree-in-bud micro nodules scattered within   the mid lung zones bilaterally.  Once again demonstrated are linear opacities   and probable mild bronchiectasis involving the right middle lobe and lingula.    There do not appear to be any significant pleural effusions.      CB/HN  dd: 02/14/2018 21:16:49 (CST)  td: 02/15/2018 10:00:37 (CST)  Doc ID   #[id number]  Job ID #[id number]    CC:       Review of Systems    Objective:      Physical Exam  Personal Diagnostic Review    No flowsheet data  found.      Assessment:       1. Postinflammatory pulmonary fibrosis    2. History of BCG vaccination        Outpatient Encounter Prescriptions as of 2/14/2018   Medication Sig Dispense Refill    alendronate (FOSAMAX) 70 MG tablet Take 1 tablet (70 mg total) by mouth every 7 days. 4 tablet 11    CALCIUM CARBONATE (CALCIUM 600 ORAL) Take by mouth.      cholecalciferol, vitamin D3, (VITAMIN D3) 5,000 unit Tab Take 5,000 Units by mouth once daily.      VIT A/VIT C/VIT E/ZINC/COPPER (ICAPS AREDS ORAL) Take by mouth.       No facility-administered encounter medications on file as of 2/14/2018.      Orders Placed This Encounter   Procedures    CBC auto differential     Standing Status:   Future     Standing Expiration Date:   8/14/2018    C-reactive protein     Standing Status:   Future     Standing Expiration Date:   8/14/2018    Sedimentation rate, manual     Standing Status:   Future     Standing Expiration Date:   8/14/2018    Spirometry without bronchodilator     Standing Status:   Future     Standing Expiration Date:   8/14/2018    DLCO-Carbon Monoxide Diffusing Capacity     Standing Status:   Future     Standing Expiration Date:   8/14/2018     Plan:     Discussed CT findings and possible indolent lung infection (?FAYE), and indications for possible treatment.  In absence of active clinical symptoms benefit of further investigation/treatment is unclear.  Will plan to monitor with return visit in 3 months with repeat CBC, ESR, CRP, Spirometry, and DLCO.  Call/return sooner if new resp symptoms arise.

## 2018-02-22 ENCOUNTER — OFFICE VISIT (OUTPATIENT)
Dept: UROGYNECOLOGY | Facility: CLINIC | Age: 68
End: 2018-02-22
Payer: COMMERCIAL

## 2018-02-22 VITALS
DIASTOLIC BLOOD PRESSURE: 62 MMHG | WEIGHT: 115.5 LBS | BODY MASS INDEX: 21.81 KG/M2 | SYSTOLIC BLOOD PRESSURE: 96 MMHG | HEIGHT: 61 IN

## 2018-02-22 DIAGNOSIS — R33.9 INCOMPLETE EMPTYING OF BLADDER: ICD-10-CM

## 2018-02-22 DIAGNOSIS — Z46.89 PESSARY MAINTENANCE: ICD-10-CM

## 2018-02-22 DIAGNOSIS — N81.4 UTEROVAGINAL PROLAPSE: Primary | ICD-10-CM

## 2018-02-22 DIAGNOSIS — N95.2 VAGINAL ATROPHY: ICD-10-CM

## 2018-02-22 PROCEDURE — 99213 OFFICE O/P EST LOW 20 MIN: CPT | Mod: 25,SA,S$GLB, | Performed by: NURSE PRACTITIONER

## 2018-02-22 PROCEDURE — 3008F BODY MASS INDEX DOCD: CPT | Mod: S$GLB,,, | Performed by: NURSE PRACTITIONER

## 2018-02-22 PROCEDURE — 99999 PR PBB SHADOW E&M-EST. PATIENT-LVL III: CPT | Mod: PBBFAC,,, | Performed by: NURSE PRACTITIONER

## 2018-02-22 PROCEDURE — 57160 INSERT PESSARY/OTHER DEVICE: CPT | Mod: S$GLB,,, | Performed by: NURSE PRACTITIONER

## 2018-02-22 NOTE — PROGRESS NOTES
Urogyn follow up  02/22/2018  .  [facility] [address] [place] [address]    Luly Lora  [id number]  1950      Luly Lora is a 67 y.o.  here for a urogyn follow up.    Last HPI from 01/23/2018     HPI:   Ohs Peq [id number]      Question 1/21/2018  8:07 PM CST   Do you...     Usually experience pressure in the lower abdomen? Symptoms not present   Usually experience heaviness or dullness in the pelvic area? Symptoms not present   Usually have a bulge or something falling out that you can see or feel in your vaginal area? Symptoms present and they bother me quite a bit   Ever have to push on the vagina or around the rectum to complete a bowel movement? Symptoms not present   Usually experience a feeling of incomplete bladder emptying? Symptoms present and they bother me somewhat   Ever have to push up on a bulge in the vaginal area with your fingers to start or complete urination? Symptoms not present   Do you...     Feel you need to strain too hard to have a bowel movement? Symptoms not present   Feel you have not completely emptied your bowels at the end of a bowel movement?  Symptoms present and they bother me somewhat   Usually lose stool beyond your control if your stool is well formed? Symptoms not present   Usually lose stool beyond your control if your stool is loose? Symptoms present and they bother me somewhat   Usually lose gas from your rectum beyond your control? Symptoms not present   Usually have pain when you pass your stool? Symptoms not present   Experience a strong sense of urgency and have to rush to the bathroom to have a bowel movement? Symptoms not present   Does part of your bowel ever pass through the rectum and bulge outside during or after a bowel movement? Symptoms present and they bother me somewhat   Do you...      Usually experience frequent urination? Symptoms present and they bother me somewhat   Usually experience urine leakage associated with a  feeling of urgency, that is, a strong sensation of needing to go to the bathroom? Symptoms present and they bother me somewhat   Usually experience urine leakage related to coughing, sneezing or laughing? Symptoms not present   Usually experience small amounts of urine leakage (that is, drops)? Symptoms present and they bother me somewhat   Usually experience difficulty emptying your bladder? Symptoms present and they bother me somewhat   Usually experience pain or discomfort in the lower abdomen or genital region? Symptoms present and they bother me somewhat   POPDI  (range: 0 - 100) 25   CRADI (range: 0 - 100) 18.75   IGNACIO (range: 0 - 100) 41.66   TOTAL SCORE  (range: 0 - 300) 85.41   Ohs Peq Urogyn Hpi      Question 1/21/2018  8:21 PM CST   General Urogynecology: Are you experiencing the following?     Dysuria (painful urination) No   Nocturia:  waking up at night to empty your bladder  Yes   If you answered yes to the previous question, how many times does this happen per night? 1-2   Enuresis (urine loss during sleep) No   Dribbling urine after you urinate Yes   Hematuria (urine appears red) No   Type of stream Interrupted   Urinary frequency: How often a day are you going to the bathroom per day?  Less than 10   Urinary Tract Infections: How many Urinary Tract Infections have you had in the past year? I have not had a UTI in the past year   If you have had a UTI in the past year, what treatments have you had so far?  I have not had a UTI in the past year   Urinary Incontinence (General): Are you experiencing the following?     Past consultation for incontinence: Have you ever seen someone for the evaluation of incontinence? No   If you answered yes to the previous question, please select all the therapies you have tried.  N/a- I answered no to the previous question   Please note the effectiveness of the therapies.     Need to wear protection to keep clothes dry  No   If you answered yes to the previous  "question, please kuldeep the protection you use.  N/a- I answered no to the previous question   If you wear protection, how much wetness is typically on each pad? N/a- I do not wear protection   If you wear protection, how often do you have to change per day, if applicable?      Stress Symptoms: Are you experiencing the following?     Leakage of urine with cough, laugh and/or sneeze No   If you answered yes to the previous question, what is the frequency in days, weeks and/or months? Never   Leakage of urine with sex No   Leakage of urine with bending/ lifting No   Leakage of urine with briskly walking or jogging No   If you lose urine for any other reason not previously mentioned, please note it below, if applicable.      Urge Symptoms: Are you experiencing the following?     Urgency ("got to go" feeling) Yes   Urge: How frequently do you feel an urge to urinate (feeling like you "gotta go" to the bathroom and can't wait) Several times a week   Do you experience a leakage of urine when you have a feeling of urgency?  No   Leakage of urine when unaware No   Past use of anticholinergics (medications used to treat overactive bladder) No   If you answered yes to the previous question, please kuldeep the anticholinergics you have used:      Have you ever used Mirbetriq (aka Mirabegron)?  No   Prolapse Symptoms: Are you experiencing any of the following?      Falling out/ Bulging/ Heaviness in the vagina (past opening) x years; wears tight underwear Yes   Vaginal/ Abdominal Pain/ Pressure Yes   Need to strain/ Push to void No   Need to wait on the toilet before you void No   Unusual position to urinate (using your hands to push back the vaginal bulge) No   Sensation of incomplete emptying Yes--has to PV/DV to help   Past use of pessary device No   If you answered yes to the previous question, please list the devices you have used below.      Bowel Symptoms: Are you experiencing any of the following?     Constipation No "   Diarrhea  No   Hematochezia (bloody stool) No   Incomplete evacuation of stool Yes   Involuntary loss of formed stool No   Fecal smearing/urgency Yes   Involuntary loss of gas No   Vaginal Symptoms: Are you experiencing any of the following?      Abnormal vaginal bleeding  No   Vaginal dryness Yes   Sexually active  Yes   Dyspareunia (painful intercourse) Yes   Estrogen use  No   Ohs Peq Pelvic Pain Urogyn      Question 1/21/2018  8:22 PM CST   Are you experiencing pelvic pain?  No      Patient Hx            Changes from last visit:  1)  UI:  (--) BERNARD   (--) UUI    (+) pantyliner:  Daytime frequency: Q 2-3 hours.  Nocturia: Yes: 2-3/night.   (--) dysuria,  (--) hematuria,  (--) frequent UTIs.  (+) complete bladder emptying.     2)  POP:  Absent with pessary in place.  Symptoms:(--)    (--) vaginal bleeding. (+) vaginal discharge--pink tinged.. (+) sexually active.  (+) dyspareunia.   (--)  Vaginal dryness.  (--) vaginal estrogen use. Not using replens    3)  BM:  (--) constipation/straining.  (--) chronic diarrhea. (--) hematochezia.  (--) fecal incontinence.  (--) fecal smearing/urgency.  (--) incomplete evacuation.      4)pessary:  Denies pain or bleeding.  Scant pink discharge.  Using #3 ring with support.  Independent in use.      Past Medical History:   Diagnosis Date    Brain bleed 1998    Osteoporosis     Pelvic fracture     Stroke     intracerebral hemorrhage 1998, unclear cause.  [facility]       Past Surgical History:   Procedure Laterality Date    EYE SURGERY  2005    ingrown eye lashes BL    WISDOM TOOTH EXTRACTION         Current Outpatient Prescriptions   Medication Sig    alendronate (FOSAMAX) 70 MG tablet Take 1 tablet (70 mg total) by mouth every 7 days.    CALCIUM CARBONATE (CALCIUM 600 ORAL) Take by mouth.    cholecalciferol, vitamin D3, (VITAMIN D3) 5,000 unit Tab Take 5,000 Units by mouth once daily.    VIT A/VIT C/VIT E/ZINC/COPPER (ICAPS AREDS ORAL) Take by mouth.     No  "current facility-administered medications for this visit.        Well woman:  Pap test: 2016, normal.  History of abnormal paps: No.  History of STIs:  No  Mammogram: Date of last: 10/16.  Result: Normal  Colonoscopy: Date of last: 2010.  Result:  normal.  Repeat due:  2020.    DEXA:  Date of last: 2016.  Result:  Osteoporosis, on meds.  Repeat due:  2018.      ROS:  As per HPI.      Exam  BP 96/62 (BP Location: Right arm, Patient Position: Sitting)   Ht 5' 1" (1.549 m)   Wt 52.4 kg (115 lb 8.3 oz)   BMI 21.83 kg/m²   General: alert and oriented, no acute distress  Respiratory: normal respiratory effort  Abd: soft, non-tender, non-distended    Pelvic  Ext. Genitalia: normal external genitalia. Normal bartholin's and skeens glands  Vagina: + atrophy. Normal vaginal mucosa without lesions. No discharge noted.   Non-tender bladder base without palpable mass.  Small area of redness right lateral vaginal wall--no abrasion  #3 ring with support pessary in place.  Cervix: no lesions  Uterus:  uterus is normal size, shape, consistency and nontender   Urethra: no masses or tenderness  Urethral meatus: no lesions, caruncle or prolapse.    PVR 10 mL    Pessary removed without difficulty.  Washed with soap and water. Reinserted without difficulty.     Impression  1. Uterovaginal prolapse     2. Incomplete emptying of bladder     3. Vaginal atrophy     4. Pessary maintenance       We reviewed the above issues and discussed options for short-term versus long-term management of her problems.   Plan:   1)  Stage 3 uterine prolapse:  --continue pessary: #3 ring + support   PESSARY CARE: Remove pessary as frequently as nightly and as infrequently as every 2-3 weeks.  Remove before intercourse.  May need to remove before bowel movements if straining dislodges.   Wash with soap and water (no ).  Replace using small amount of water-based lubricant (like KY jelly) at end being introduced into vagina.     --surgical option: " vaginal hysterectomy, uterosacral suspension, anterior/posterior repair  --would need ultrasound/bladder testing beforehand       2)  Vaginal atrophy (dryness):    Use REPLENS or REFRESH OTC: 1/2 applicator full in vagina twice a week.       3)  Urinary urgency/post-void dribbling:  --trial of pessary   --Empty bladder every 3 hours.  Empty well: wait a minute, lean forward on toilet.    --Avoid dietary irritants (see sheet).  Keep diary x 3-5 days to determine your irritants.  --KEGELS: do 10 in AM and 10 in PM, holding each x 10 seconds.  When you feel urge to go, STOP, KEGEL, and when urge has passed, then go to bathroom.  Consider PT in future.    --URGE: consider medication in future.  Takes 2-4 weeks to see if will have effect.  For dry mouth: get sour, sugar free lozenge or gum.       4) elevated PVR:  --PVR improved with pessary    5)  RTC 6 months      30 minutes were spent in face to face time with this patient  75 % of this time was spent in counseling and/or coordination of care  ME@  [facility]  Division of Female Pelvic Medicine and Reconstructive Surgery  Department of Obstetrics & Gynecology

## 2018-02-22 NOTE — PATIENT INSTRUCTIONS
1)  Stage 3 uterine prolapse:  --continue pessary: #3 ring + support   PESSARY CARE: Remove pessary as frequently as nightly and as infrequently as every 2-3 weeks.  Remove before intercourse.  May need to remove before bowel movements if straining dislodges.   Wash with soap and water (no ).  Replace using small amount of water-based lubricant (like KY jelly) at end being introduced into vagina.       --surgical option: vaginal hysterectomy, uterosacral suspension, anterior/posterior repair  --would need ultrasound/bladder testing beforehand       2)  Vaginal atrophy (dryness):    Use REPLENS or REFRESH OTC: 1/2 applicator full in vagina twice a week.       3)  Urinary urgency/post-void dribbling:  --trial of pessary   --Empty bladder every 3 hours.  Empty well: wait a minute, lean forward on toilet.    --Avoid dietary irritants (see sheet).  Keep diary x 3-5 days to determine your irritants.  --KEGELS: do 10 in AM and 10 in PM, holding each x 10 seconds.  When you feel urge to go, STOP, KEGEL, and when urge has passed, then go to bathroom.  Consider PT in future.    --URGE: consider medication in future.  Takes 2-4 weeks to see if will have effect.  For dry mouth: get sour, sugar free lozenge or gum.       4) elevated PVR:  --PVR improved with pessary    5)  RTC 6 months

## 2018-05-14 ENCOUNTER — HOSPITAL ENCOUNTER (OUTPATIENT)
Dept: PULMONOLOGY | Facility: CLINIC | Age: 68
Discharge: HOME | End: 2018-05-14
Payer: COMMERCIAL

## 2018-05-14 ENCOUNTER — LAB VISIT (OUTPATIENT)
Dept: LAB | Facility: HOSPITAL | Age: 68
End: 2018-05-14
Attending: INTERNAL MEDICINE
Payer: COMMERCIAL

## 2018-05-14 DIAGNOSIS — J84.10 POSTINFLAMMATORY PULMONARY FIBROSIS: ICD-10-CM

## 2018-05-14 LAB
BASOPHILS # BLD AUTO: 0.03 K/UL
BASOPHILS NFR BLD: 0.3 %
CRP SERPL-MCNC: 0.5 MG/L
DIFFERENTIAL METHOD: ABNORMAL
EOSINOPHIL # BLD AUTO: 0.1 K/UL
EOSINOPHIL NFR BLD: 0.9 %
ERYTHROCYTE [DISTWIDTH] IN BLOOD BY AUTOMATED COUNT: 13.6 %
ERYTHROCYTE [SEDIMENTATION RATE] IN BLOOD BY WESTERGREN METHOD: 21 MM/HR
HCT VFR BLD AUTO: 39.4 %
HGB BLD-MCNC: 12.2 G/DL
IMM GRANULOCYTES # BLD AUTO: 0.03 K/UL
IMM GRANULOCYTES NFR BLD AUTO: 0.3 %
LYMPHOCYTES # BLD AUTO: 2.3 K/UL
LYMPHOCYTES NFR BLD: 25.5 %
MCH RBC QN AUTO: 30.4 PG
MCHC RBC AUTO-ENTMCNC: 31 G/DL
MCV RBC AUTO: 98 FL
MONOCYTES # BLD AUTO: 0.6 K/UL
MONOCYTES NFR BLD: 6.4 %
NEUTROPHILS # BLD AUTO: 6 K/UL
NEUTROPHILS NFR BLD: 66.6 %
NRBC BLD-RTO: 0 /100 WBC
PLATELET # BLD AUTO: 204 K/UL
PMV BLD AUTO: 9.9 FL
PRE FEV1 FVC: 80
PRE FEV1: 2.35
PRE FVC: 2.94
PREDICTED FEV1 FVC: 81
PREDICTED FEV1: 2.08
PREDICTED FVC: 2.61
RBC # BLD AUTO: 4.01 M/UL
WBC # BLD AUTO: 8.94 K/UL

## 2018-05-14 PROCEDURE — 85651 RBC SED RATE NONAUTOMATED: CPT

## 2018-05-14 PROCEDURE — 94010 BREATHING CAPACITY TEST: CPT | Mod: S$GLB,,, | Performed by: INTERNAL MEDICINE

## 2018-05-14 PROCEDURE — 36415 COLL VENOUS BLD VENIPUNCTURE: CPT

## 2018-05-14 PROCEDURE — 94729 DIFFUSING CAPACITY: CPT | Mod: S$GLB,,, | Performed by: INTERNAL MEDICINE

## 2018-05-14 PROCEDURE — 86140 C-REACTIVE PROTEIN: CPT

## 2018-05-14 PROCEDURE — 85025 COMPLETE CBC W/AUTO DIFF WBC: CPT

## 2018-08-15 ENCOUNTER — OFFICE VISIT (OUTPATIENT)
Dept: PULMONOLOGY | Facility: CLINIC | Age: 68
End: 2018-08-15
Payer: COMMERCIAL

## 2018-08-15 VITALS
RESPIRATION RATE: 12 BRPM | HEIGHT: 61 IN | DIASTOLIC BLOOD PRESSURE: 66 MMHG | WEIGHT: 118.38 LBS | BODY MASS INDEX: 22.35 KG/M2 | SYSTOLIC BLOOD PRESSURE: 118 MMHG | OXYGEN SATURATION: 99 % | HEART RATE: 64 BPM

## 2018-08-15 DIAGNOSIS — J47.9 ADULT BRONCHIECTASIS: ICD-10-CM

## 2018-08-15 DIAGNOSIS — J84.10 POSTINFLAMMATORY PULMONARY FIBROSIS: Primary | ICD-10-CM

## 2018-08-15 PROCEDURE — 99214 OFFICE O/P EST MOD 30 MIN: CPT | Mod: S$GLB,,, | Performed by: INTERNAL MEDICINE

## 2018-08-15 PROCEDURE — 99999 PR PBB SHADOW E&M-EST. PATIENT-LVL III: CPT | Mod: PBBFAC,,, | Performed by: INTERNAL MEDICINE

## 2018-08-15 PROCEDURE — 3008F BODY MASS INDEX DOCD: CPT | Mod: CPTII,S$GLB,, | Performed by: INTERNAL MEDICINE

## 2018-08-15 NOTE — PATIENT INSTRUCTIONS
Discussed CT findings again.  Main concern is for indolent lung infection (FAYE), but absence of clinical   findings to indicate any impairment makes value of further investigation/treatment unclear.  We have agreed to continue monitoring with return visit in 10/2018 with CBC, ESR, CRP, Spirometry, and DLCO.

## 2018-08-15 NOTE — PROGRESS NOTES
Subjective:       Patient ID: Luly Lora is a 67 y.o. female.    Chief Complaint: Bronchiectasis    HPI HISTORY OF PRESENT ILLNESS:  Mrs. Lora is a 67-year-old nonsmoker, who comes for   interval assessment of an abnormal CT scan of the chest.  Since her visit here   in February, she has continued to feel well.  She has had an occasional cough,   but no associated sputum production, shortness of breath, wheezing, or thoracic   pain.  She remains active.  She is not having any constitutional symptoms such   as decreased energy or appetite or night sweats.    In May 2018, Mrs. Lora did repeat laboratory studies as planned.  These show a   minimal elevation in the sedimentation rate, but otherwise were normal.  She   also did a repeat spirometry and DLCO, which showed stable (normal) findings.      CB/HN  dd: 08/15/2018 20:52:14 (CDT)  td: 08/16/2018 12:05:10 (CDT)  Doc ID   #[id number]  Job ID #[id number]    CC:       Review of Systems    Objective:      Physical Exam  Personal Diagnostic Review    No flowsheet data found.      Assessment:       1. Postinflammatory pulmonary fibrosis    2. Adult bronchiectasis        Outpatient Encounter Medications as of 8/15/2018   Medication Sig Dispense Refill    alendronate (FOSAMAX) 70 MG tablet Take 1 tablet (70 mg total) by mouth every 7 days. 4 tablet 11    CALCIUM CARBONATE (CALCIUM 600 ORAL) Take by mouth.      cholecalciferol, vitamin D3, (VITAMIN D3) 5,000 unit Tab Take 5,000 Units by mouth once daily.      VIT A/VIT C/VIT E/ZINC/COPPER (ICAPS AREDS ORAL) Take by mouth.       No facility-administered encounter medications on file as of 8/15/2018.      Orders Placed This Encounter   Procedures    CBC auto differential     Standing Status:   Future     Standing Expiration Date:   4/15/2019    C-reactive protein     Standing Status:   Future     Standing Expiration Date:   4/15/2019    Sedimentation rate     Standing Status:   Future     Standing Expiration Date:   4/15/2019     Spirometry without bronchodilator     Standing Status:   Future     Standing Expiration Date:   4/15/2019    DLCO-Carbon Monoxide Diffusing Capacity     Standing Status:   Future     Standing Expiration Date:   4/15/2019     Plan:     Discussed CT findings again.  Main concern is for indolent lung infection (FAYE), but absence of clinical   findings to indicate any impairment makes value of further investigation/treatment unclear.  We have agreed to continue monitoring with return visit in 10/2018 with CBC, ESR, CRP, Spirometry, and DLCO.    NOTE:  25 min visit with all time counseling regarding abnormal CT.

## 2018-10-10 ENCOUNTER — OFFICE VISIT (OUTPATIENT)
Dept: PULMONOLOGY | Facility: CLINIC | Age: 68
End: 2018-10-10
Payer: COMMERCIAL

## 2018-10-10 ENCOUNTER — HOSPITAL ENCOUNTER (OUTPATIENT)
Dept: PULMONOLOGY | Facility: CLINIC | Age: 68
Discharge: HOME | End: 2018-10-10
Payer: COMMERCIAL

## 2018-10-10 ENCOUNTER — LAB VISIT (OUTPATIENT)
Dept: LAB | Facility: HOSPITAL | Age: 68
End: 2018-10-10
Attending: INTERNAL MEDICINE
Payer: COMMERCIAL

## 2018-10-10 VITALS
BODY MASS INDEX: 21.9 KG/M2 | SYSTOLIC BLOOD PRESSURE: 90 MMHG | DIASTOLIC BLOOD PRESSURE: 64 MMHG | HEART RATE: 53 BPM | WEIGHT: 116 LBS | OXYGEN SATURATION: 97 % | HEIGHT: 61 IN

## 2018-10-10 DIAGNOSIS — R93.89 ABNORMAL CT OF THE CHEST: ICD-10-CM

## 2018-10-10 DIAGNOSIS — J47.9 ADULT BRONCHIECTASIS: ICD-10-CM

## 2018-10-10 DIAGNOSIS — J47.9 ADULT BRONCHIECTASIS: Primary | ICD-10-CM

## 2018-10-10 DIAGNOSIS — J84.10 POSTINFLAMMATORY PULMONARY FIBROSIS: ICD-10-CM

## 2018-10-10 DIAGNOSIS — Z92.29 HISTORY OF BCG VACCINATION: ICD-10-CM

## 2018-10-10 LAB
BASOPHILS # BLD AUTO: 0.02 K/UL
BASOPHILS NFR BLD: 0.3 %
CRP SERPL-MCNC: 1.4 MG/L
DIFFERENTIAL METHOD: ABNORMAL
EOSINOPHIL # BLD AUTO: 0.1 K/UL
EOSINOPHIL NFR BLD: 0.7 %
ERYTHROCYTE [DISTWIDTH] IN BLOOD BY AUTOMATED COUNT: 13.5 %
ERYTHROCYTE [SEDIMENTATION RATE] IN BLOOD BY WESTERGREN METHOD: 13 MM/HR
HCT VFR BLD AUTO: 40.2 %
HGB BLD-MCNC: 12.6 G/DL
IMM GRANULOCYTES # BLD AUTO: 0.01 K/UL
IMM GRANULOCYTES NFR BLD AUTO: 0.1 %
LYMPHOCYTES # BLD AUTO: 1.6 K/UL
LYMPHOCYTES NFR BLD: 23.6 %
MCH RBC QN AUTO: 30.8 PG
MCHC RBC AUTO-ENTMCNC: 31.3 G/DL
MCV RBC AUTO: 98 FL
MONOCYTES # BLD AUTO: 0.4 K/UL
MONOCYTES NFR BLD: 5.5 %
NEUTROPHILS # BLD AUTO: 4.7 K/UL
NEUTROPHILS NFR BLD: 69.8 %
NRBC BLD-RTO: 0 /100 WBC
PLATELET # BLD AUTO: 216 K/UL
PMV BLD AUTO: 9.8 FL
PRE FEV1 FVC: 78
PRE FEV1: 2.07
PRE FVC: 2.67
PREDICTED FEV1 FVC: 81
PREDICTED FEV1: 2.06
PREDICTED FVC: 2.59
RBC # BLD AUTO: 4.09 M/UL
WBC # BLD AUTO: 6.78 K/UL

## 2018-10-10 PROCEDURE — 36415 COLL VENOUS BLD VENIPUNCTURE: CPT

## 2018-10-10 PROCEDURE — 94010 BREATHING CAPACITY TEST: CPT | Mod: S$GLB,,, | Performed by: INTERNAL MEDICINE

## 2018-10-10 PROCEDURE — 1101F PT FALLS ASSESS-DOCD LE1/YR: CPT | Mod: CPTII,S$GLB,, | Performed by: EMERGENCY MEDICINE

## 2018-10-10 PROCEDURE — 99999 PR PBB SHADOW E&M-EST. PATIENT-LVL III: CPT | Mod: PBBFAC,,, | Performed by: EMERGENCY MEDICINE

## 2018-10-10 PROCEDURE — 86140 C-REACTIVE PROTEIN: CPT

## 2018-10-10 PROCEDURE — 94729 DIFFUSING CAPACITY: CPT | Mod: S$GLB,,, | Performed by: INTERNAL MEDICINE

## 2018-10-10 PROCEDURE — 85025 COMPLETE CBC W/AUTO DIFF WBC: CPT

## 2018-10-10 PROCEDURE — 85652 RBC SED RATE AUTOMATED: CPT

## 2018-10-10 PROCEDURE — 99214 OFFICE O/P EST MOD 30 MIN: CPT | Mod: S$GLB,,, | Performed by: EMERGENCY MEDICINE

## 2018-10-10 RX ORDER — HYDROCORTISONE 25 MG/G
CREAM TOPICAL 2 TIMES DAILY
COMMUNITY
End: 2021-05-26

## 2018-10-10 NOTE — PROGRESS NOTES
"    Pulmonary & Critical Care Medicine   Clinic Note      HPI:     67 y/o female presenting to clinic today for follow up evaluation. Patient previously followed closely by Dr. Crews. She received CT imaging for ongoing LLQ pain in past demonstrating chest abnl consistent with bronchiectasis. Never had pulmonary or constitutional symptoms and has been managed with active surveillance and no clinical evidence of ongoing progression. Today she states that she feels fine "like a normal person" denies any sputum production, hemoptysis, cough, CROWE, orthopnea, wheezing. No F/C/NS or additional constitutional symptoms today. Weight stable. No additional complaints.     Life long non-smoker   From [place].   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 Never treated for LTBI.. Did receive BCG in past but also possible TB exposure   . One child who now lives in [place]. Lives near [facility]        Past Medical History:   Diagnosis Date    Brain bleed 1998    Osteoporosis     Pelvic fracture     Stroke     intracerebral hemorrhage 1998, unclear cause.  evaluated at [employer] [facility] [employer]     Past Surgical History:   Procedure Laterality Date    EYE SURGERY  2005    ingrown eye lashes BL    WISDOM TOOTH EXTRACTION       Social History:   Social History     Socioeconomic History    Marital status:      Spouse name: Not on file    Number of children: Not on file    Years of education: Not on file    Highest education level: Not on file   Social Needs    Financial resource strain: Not on file    Food insecurity - worry: Not on file    Food insecurity - inability: Not on file    Transportation needs - medical: Not on file    Transportation needs - non-medical: Not on file   Occupational History    Occupation: retired   Tobacco Use    Smoking status: Never Smoker    Smokeless tobacco: Never Used   Substance and Sexual Activity    " Alcohol use: Yes     Alcohol/week: 0.6 oz     Types: 1 Glasses of wine per week     Comment: occasionally    Drug use: No    Sexual activity: Yes     Partners: Male   Other Topics Concern    Are you pregnant or think you may be? 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         Exercise:  Walking 3 days a week, swam in past, resistance machines' dumbbells, table tennis.          Chinese.  Emigrated 1988.     Family History   Problem Relation Age of Onset    Hypertension Mother     Heart disease Mother     Kidney failure Mother     Diabetes Mother     Parkinsonism Father     Hypertension Brother     No Known Problems Son     Hypertension Brother     Diabetes Brother     Breast cancer Neg Hx     Ovarian cancer Neg Hx     Cervical cancer Neg Hx     Endometrial cancer Neg Hx     Vaginal cancer Neg Hx     Asthma Neg Hx     Emphysema Neg Hx      Drug Allergies:   No Known Allergies    Review of Systems:     Constitutional: Negative for fever, chills, diaphoresis, activity change, appetite change and fatigue.   HENT: Negative for congestion, drooling, facial swelling, hearing loss, rhinorrhea, sinus pressure, tinnitus, trouble swallowing and voice change.    Eyes: Negative for photophobia, pain and visual disturbance.   Respiratory: Negative for cough, chest tightness and shortness of breath.    Cardiovascular: Negative for chest pain, palpitations and leg swelling.   Gastrointestinal: Negative for nausea, vomiting, abdominal pain, diarrhea and abdominal distention.   Endocrine: Negative for cold intolerance, heat intolerance, polydipsia and polyuria.   Genitourinary: Negative for dysuria, frequency and hematuria.   Musculoskeletal: Negative for myalgias, back pain, arthralgias, neck pain and neck stiffness.   Skin: Negative for color change,  "pallor, rash and wound.   Allergic/Immunologic: Negative for immunocompromised state.   Neurological: Negative for dizziness, weakness and headaches.    A comprehensive 12-point review of systems was performed, and is negative except for those items mentioned above in the HPI section of this note.     Vital Signs:    BP 90/64   Pulse (!) 53   Ht 5' 1" (1.549 m)   Wt 52.6 kg (116 lb)   SpO2 97%   BMI 21.92 kg/m²      Physical Exam:   GEN- NAD AAOx3 Well Built, Well Appearing   HEENT- ATNC, PERRLA, EOMI, OP-Cl. No JVD, LAD or bruit noted. Trachea Midline.   CV- RRR No M/R/G  RESP- CTA-Bilateral   GI- S/NT/ND. Positive BS X 4. No HSM Noted  BACK- Spine midline. No step off, crepitus or deformity noted. No midline TTP.   Ext- MAEW, No deformity. No edema or rashes noted.   Neuro- Strength 5/5 symmetric. CN 2-12 intact. Normal gait. Normal sensation.  .     Personal Review and Summary of Prior Diagnostics    Laboratory Studies:     CRP-1.4 ESR pending. CBC normal No Eos     Microbiology Data: None   Tspot- None     Summary of Chest Imaging Personally Reviewed:     CT chest 1/2018 reviewed- bronchiectasis with scattered centrilobular nodules some tree in bud. Patch consolidative changes with bronchiectasis lingula and RML. No cavitary changes.      2D Echo: None     PFT's:     FEV1/FVC- 78   FEV1- 2.07L (105)  FVC- 2.67 (104)   DLCO- 79         Assessment:       No diagnosis found.    Outpatient Encounter Medications as of 10/10/2018   Medication Sig Dispense Refill    alendronate (FOSAMAX) 70 MG tablet Take 1 tablet (70 mg total) by mouth every 7 days. 4 tablet 11    CALCIUM CARBONATE (CALCIUM 600 ORAL) Take by mouth.      cholecalciferol, vitamin D3, (VITAMIN D3) 5,000 unit Tab Take 5,000 Units by mouth once daily.      VIT A/VIT C/VIT E/ZINC/COPPER (ICAPS AREDS ORAL) Take by mouth.       No facility-administered encounter medications on file as of 10/10/2018.      No orders of the defined types were placed in " this encounter.    Plan:       1. Bronchiectasis- CT chest reviewed by me and notable for areas of bronchiectasis with associated tree in bud centrilobular nodules. 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 She is completely asymptomatic with essentially no change in inflammatory markers and no ventilatory defects on PFTs.. Due to history of prior positive PPD and immigration from high prevalence region ([place]. Seems reasonable to check T spot.. Radiographic pattern would not be typical of active TB in non-immunocompromised patient.. If T-spot negative I will continue with active surveillance strategy for presumed FAYE..     -- RTC in 5 months with T spot, repeat CT imaging chest, ESR/CRP     Call in the interim if any issues          Rodo Hinojosa M.D.   [facility]

## 2018-12-13 ENCOUNTER — TELEPHONE (OUTPATIENT)
Dept: INTERNAL MEDICINE | Facility: CLINIC | Age: 68
End: 2018-12-13

## 2018-12-13 NOTE — TELEPHONE ENCOUNTER
Called and spoke to pt and resched apt from 1-24-19 to 1-23-19 because Dr. Copeland will not be in on 1-24-19

## 2019-01-09 ENCOUNTER — TELEPHONE (OUTPATIENT)
Dept: ADMINISTRATIVE | Facility: HOSPITAL | Age: 69
End: 2019-01-09

## 2019-01-09 DIAGNOSIS — Z12.39 SCREENING FOR BREAST CANCER: Primary | ICD-10-CM

## 2019-01-09 DIAGNOSIS — Z00.00 ANNUAL PHYSICAL EXAM: Primary | ICD-10-CM

## 2019-01-13 ENCOUNTER — HOSPITAL ENCOUNTER (OUTPATIENT)
Dept: RADIOLOGY | Facility: HOSPITAL | Age: 69
Discharge: HOME | End: 2019-01-13
Attending: INTERNAL MEDICINE
Payer: COMMERCIAL

## 2019-01-13 DIAGNOSIS — Z12.39 SCREENING FOR BREAST CANCER: ICD-10-CM

## 2019-01-13 PROCEDURE — 77067 SCR MAMMO BI INCL CAD: CPT | Mod: 26,,, | Performed by: RADIOLOGY

## 2019-01-13 PROCEDURE — 77063 MAMMO DIGITAL SCREENING BILAT WITH TOMOSYNTHESIS_CAD: ICD-10-PCS | Mod: 26,,, | Performed by: RADIOLOGY

## 2019-01-13 PROCEDURE — 77067 SCR MAMMO BI INCL CAD: CPT | Mod: TC

## 2019-01-13 PROCEDURE — 77063 BREAST TOMOSYNTHESIS BI: CPT | Mod: 26,,, | Performed by: RADIOLOGY

## 2019-01-13 PROCEDURE — 77067 MAMMO DIGITAL SCREENING BILAT WITH TOMOSYNTHESIS_CAD: ICD-10-PCS | Mod: 26,,, | Performed by: RADIOLOGY

## 2019-01-16 ENCOUNTER — LAB VISIT (OUTPATIENT)
Dept: LAB | Facility: HOSPITAL | Age: 69
End: 2019-01-16
Attending: INTERNAL MEDICINE
Payer: COMMERCIAL

## 2019-01-16 DIAGNOSIS — Z00.00 ANNUAL PHYSICAL EXAM: ICD-10-CM

## 2019-01-16 LAB
ALBUMIN SERPL BCP-MCNC: 4 G/DL
ALP SERPL-CCNC: 53 U/L
ALT SERPL W/O P-5'-P-CCNC: 18 U/L
ANION GAP SERPL CALC-SCNC: 9 MMOL/L
AST SERPL-CCNC: 20 U/L
BILIRUB SERPL-MCNC: 0.4 MG/DL
BUN SERPL-MCNC: 14 MG/DL
CALCIUM SERPL-MCNC: 9.6 MG/DL
CHLORIDE SERPL-SCNC: 105 MMOL/L
CHOLEST SERPL-MCNC: 199 MG/DL
CHOLEST/HDLC SERPL: 3.6 {RATIO}
CO2 SERPL-SCNC: 28 MMOL/L
CREAT SERPL-MCNC: 0.7 MG/DL
EST. GFR  (AFRICAN AMERICAN): >60 ML/MIN/1.73 M^2
EST. GFR  (NON AFRICAN AMERICAN): >60 ML/MIN/1.73 M^2
GLUCOSE SERPL-MCNC: 88 MG/DL
HDLC SERPL-MCNC: 55 MG/DL
HDLC SERPL: 27.6 %
LDLC SERPL CALC-MCNC: 125.2 MG/DL
NONHDLC SERPL-MCNC: 144 MG/DL
POTASSIUM SERPL-SCNC: 4.2 MMOL/L
PROT SERPL-MCNC: 7.3 G/DL
SODIUM SERPL-SCNC: 142 MMOL/L
TRIGL SERPL-MCNC: 94 MG/DL

## 2019-01-16 PROCEDURE — 80053 COMPREHEN METABOLIC PANEL: CPT

## 2019-01-16 PROCEDURE — 80061 LIPID PANEL: CPT

## 2019-01-16 PROCEDURE — 36415 COLL VENOUS BLD VENIPUNCTURE: CPT

## 2019-01-23 ENCOUNTER — CLINICAL SUPPORT (OUTPATIENT)
Dept: INTERNAL MEDICINE | Facility: CLINIC | Age: 69
End: 2019-01-23
Payer: COMMERCIAL

## 2019-01-23 ENCOUNTER — OFFICE VISIT (OUTPATIENT)
Dept: INTERNAL MEDICINE | Facility: CLINIC | Age: 69
End: 2019-01-23
Payer: COMMERCIAL

## 2019-01-23 VITALS
BODY MASS INDEX: 22.48 KG/M2 | WEIGHT: 119.06 LBS | SYSTOLIC BLOOD PRESSURE: 100 MMHG | OXYGEN SATURATION: 98 % | HEIGHT: 61 IN | HEART RATE: 64 BPM | DIASTOLIC BLOOD PRESSURE: 64 MMHG

## 2019-01-23 DIAGNOSIS — M81.0 OSTEOPOROSIS, POST-MENOPAUSAL: ICD-10-CM

## 2019-01-23 DIAGNOSIS — Z00.00 ANNUAL PHYSICAL EXAM: Primary | ICD-10-CM

## 2019-01-23 DIAGNOSIS — R21 RASH: ICD-10-CM

## 2019-01-23 DIAGNOSIS — B35.1 ONYCHOMYCOSIS: ICD-10-CM

## 2019-01-23 DIAGNOSIS — Z23 NEED FOR 23-POLYVALENT PNEUMOCOCCAL POLYSACCHARIDE VACCINE: ICD-10-CM

## 2019-01-23 PROCEDURE — 99397 PER PM REEVAL EST PAT 65+ YR: CPT | Mod: 25,S$GLB,, | Performed by: INTERNAL MEDICINE

## 2019-01-23 PROCEDURE — 99999 PR PBB SHADOW E&M-EST. PATIENT-LVL III: ICD-10-PCS | Mod: PBBFAC,,, | Performed by: INTERNAL MEDICINE

## 2019-01-23 PROCEDURE — 90732 PNEUMOCOCCAL POLYSACCHARIDE VACCINE 23-VALENT =>2YO SQ IM: ICD-10-PCS | Mod: S$GLB,,, | Performed by: INTERNAL MEDICINE

## 2019-01-23 PROCEDURE — 90732 PPSV23 VACC 2 YRS+ SUBQ/IM: CPT | Mod: S$GLB,,, | Performed by: INTERNAL MEDICINE

## 2019-01-23 PROCEDURE — 99999 PR PBB SHADOW E&M-EST. PATIENT-LVL III: CPT | Mod: PBBFAC,,, | Performed by: INTERNAL MEDICINE

## 2019-01-23 PROCEDURE — 99397 PR PREVENTIVE VISIT,EST,65 & OVER: ICD-10-PCS | Mod: 25,S$GLB,, | Performed by: INTERNAL MEDICINE

## 2019-01-23 PROCEDURE — 90471 PNEUMOCOCCAL POLYSACCHARIDE VACCINE 23-VALENT =>2YO SQ IM: ICD-10-PCS | Mod: S$GLB,,, | Performed by: INTERNAL MEDICINE

## 2019-01-23 PROCEDURE — 90471 IMMUNIZATION ADMIN: CPT | Mod: S$GLB,,, | Performed by: INTERNAL MEDICINE

## 2019-01-23 RX ORDER — ALENDRONATE SODIUM 70 MG/1
70 TABLET ORAL
Qty: 4 TABLET | Refills: 11 | Status: SHIPPED | OUTPATIENT
Start: 2019-01-23 | End: 2020-01-22

## 2019-01-23 RX ORDER — TRIAMCINOLONE ACETONIDE 1 MG/G
CREAM TOPICAL 2 TIMES DAILY
Qty: 28.4 G | Refills: 0 | Status: SHIPPED | OUTPATIENT
Start: 2019-01-23 | End: 2019-02-15 | Stop reason: SDUPTHER

## 2019-01-23 RX ORDER — FLUCONAZOLE 150 MG/1
150 TABLET ORAL DAILY
Qty: 90 TABLET | Refills: 0 | Status: SHIPPED | OUTPATIENT
Start: 2019-01-23 | End: 2019-01-24

## 2019-01-23 NOTE — PATIENT INSTRUCTIONS
Lamisil daily for 12 weeks.    Liver tests in 1 and 2 months    Triamcinolone cream for rash.  Call if not helpful

## 2019-01-23 NOTE — PROGRESS NOTES
Subjective:       Patient ID: Luly Lora is a 68 y.o. female.    Chief Complaint: Annual Exam    HPI   Osteoporosis, taking fosamax most weeks.    Abd pain has resolved.    She has new diagnosis of bronchiectasis and poss FAYE infection.       C/o rash R neck more than a year ago. Worsened over the summer.   Visited Dr Williamson.  Was prescribed HC 2.5%, which worked.  But recurred.  She used cram again, but it didn't work.  She thinks certain foods worsen rash.  She stopped alcohol - used to drink 1 beer a week.    She also has a fingernail fungus.  Dr Williamson cultured finger and toe nail and returned fungal.  Minimal improvement with topical painting.      Using pessary.  Review of Systems   Constitutional: Negative for fever and unexpected weight change.   HENT: Negative for congestion and postnasal drip.    Respiratory: Negative for chest tightness, shortness of breath and wheezing.    Cardiovascular: Negative for chest pain and leg swelling.   Gastrointestinal: Negative for abdominal pain, anal bleeding, constipation, diarrhea, nausea and vomiting.   Genitourinary: Negative for dysuria and urgency.   Skin: Positive for rash.   Neurological: Negative for headaches.   Psychiatric/Behavioral: Negative for dysphoric mood and sleep disturbance. The patient is not nervous/anxious.            Objective:      Physical Exam   Constitutional: She is oriented to person, place, and time. She appears well-developed and well-nourished. No distress.   HENT:   Head: Normocephalic and atraumatic.   Right Ear: External ear normal.   Left Ear: External ear normal.   Nose: Nose normal.   Mouth/Throat: Oropharynx is clear and moist.   Eyes: Conjunctivae and EOM are normal. Pupils are equal, round, and reactive to light. Right eye exhibits no discharge. Left eye exhibits no discharge. No scleral icterus.   Neck: Normal range of motion. Neck supple. No JVD present. No thyromegaly present.   Cardiovascular: Normal rate, regular rhythm and normal  heart sounds. Exam reveals no gallop.   No murmur heard.  Pulmonary/Chest: Effort normal and breath sounds normal. No respiratory distress. She has no wheezes. She has no rales.   Abdominal: Soft. Bowel sounds are normal. She exhibits no distension and no mass. There is no tenderness. There is no rebound and no guarding.   Genitourinary: No breast tenderness, discharge or bleeding.   Musculoskeletal: Normal range of motion. She exhibits no edema or tenderness.   Lymphadenopathy:     She has no cervical adenopathy.   Neurological: She is alert and oriented to person, place, and time. No cranial nerve deficit. Coordination normal.   Skin: Skin is warm and dry. No rash noted.   onychomycosis gt nails bilat.  Minimal white distal discoloration of 1 of fingernails, L hand.    Chronically inflamed skin r upper chest.  Linear erythema R lower neck.  Large patch of hyperpigmented skin R upper back   Psychiatric: She has a normal mood and affect. Her behavior is normal. Judgment and thought content normal.       Assessment:       1. Annual physical exam    2. Onychomycosis    3. Rash /eczema   4. Osteoporosis, post-menopausal    5. Need for 23-polyvalent pneumococcal polysaccharide vaccine        Plan:       Luly was seen today for annual exam.    Diagnoses and all orders for this visit:    Annual physical exam    Onychomycosis  -     lamisil 250 MG Tab; Take 1 tablet (150 mg total) by mouth once daily. for 1 day  -     ALT (SGPT); Future  -     AST (SGOT); Future  -     AST (SGOT); Future  -     ALT (SGPT); Future    Rash    Osteoporosis, post-menopausal  -     DXA Bone Density Spine And Hip; Future    Need for 23-polyvalent pneumococcal polysaccharide vaccine  -     Pneumococcal Polysaccharide Vaccine (23 Valent) (SQ/IM)    Other orders  -     triamcinolone acetonide 0.1% (KENALOG) 0.1 % cream; Apply topically 2 (two) times daily.  -     alendronate (FOSAMAX) 70 MG tablet; Take 1 tablet (70 mg total) by mouth every 7  days.       pneumococcal 23    See pt instructions  CHRISTEN Dr Williamson's notes   We discussed potential hepatic risks of lamisil therapy    Addendum- pt was informed that Lamisil is the correct medication (not diflucan).  Erx also sent to pharmacy.

## 2019-01-28 ENCOUNTER — TELEPHONE (OUTPATIENT)
Dept: INTERNAL MEDICINE | Facility: CLINIC | Age: 69
End: 2019-01-28

## 2019-01-28 DIAGNOSIS — B35.1 ONYCHOMYCOSIS: Primary | ICD-10-CM

## 2019-01-28 RX ORDER — TERBINAFINE HYDROCHLORIDE 250 MG/1
250 TABLET ORAL DAILY
Qty: 90 TABLET | Refills: 0 | Status: SHIPPED | OUTPATIENT
Start: 2019-01-28 | End: 2019-02-27

## 2019-01-28 RX ORDER — TERBINAFINE HYDROCHLORIDE 250 MG/1
250 TABLET ORAL DAILY
Qty: 90 TABLET | Refills: 0 | Status: SHIPPED | OUTPATIENT
Start: 2019-01-28 | End: 2019-01-28

## 2019-01-31 ENCOUNTER — TELEPHONE (OUTPATIENT)
Dept: INTERNAL MEDICINE | Facility: CLINIC | Age: 69
End: 2019-01-31

## 2019-01-31 NOTE — TELEPHONE ENCOUNTER
----- Message from Reshma Pimentel sent at 1/31/2019 10:49 AM CST -----  Contact: Pt Mobile/Home [phone]   Patient would like a call back in regards to her being seen on 01/23/2019 for a physical. She said that she have not received her new medication and she would like for you to send the script to..    Patient's pharmacy:[facility] - [address]  Phone# [phone],Fax# [phone]    She also want to talk to you about some other medication for osteoporosis and nail fungus.

## 2019-02-17 RX ORDER — TRIAMCINOLONE ACETONIDE 1 MG/G
CREAM TOPICAL
Qty: 30 G | Refills: 0 | Status: SHIPPED | OUTPATIENT
Start: 2019-02-17 | End: 2021-05-26

## 2019-02-18 ENCOUNTER — TELEPHONE (OUTPATIENT)
Dept: INTERNAL MEDICINE | Facility: CLINIC | Age: 69
End: 2019-02-18

## 2019-02-18 NOTE — TELEPHONE ENCOUNTER
Lab work should be completed after on lamisil x 4 weeks and then again after on lamisil x 8 weeks.

## 2019-02-18 NOTE — TELEPHONE ENCOUNTER
----- Message from Kristen Blas sent at 2/18/2019 10:50 AM CST -----  Contact: pt  Pt would like to be called back regarding up coming appt have not   received medication and want to know if she should do blood test    Pt can be reached at [phone]

## 2019-02-18 NOTE — TELEPHONE ENCOUNTER
Called and spoke to pt, pt has blood work sched 2-22-19, pt has not received terbinafine HCl (LAMISIL) 250 mg tablet but it was just shipped; should pt still have lab work done 2-22-19 or should labs be postponed until after she receives medication? If so, how long does pt need to be on meds before labs done?

## 2019-03-22 ENCOUNTER — LAB VISIT (OUTPATIENT)
Dept: LAB | Facility: HOSPITAL | Age: 69
End: 2019-03-22
Attending: INTERNAL MEDICINE
Payer: COMMERCIAL

## 2019-03-22 DIAGNOSIS — B35.1 ONYCHOMYCOSIS: ICD-10-CM

## 2019-03-22 LAB
ALT SERPL W/O P-5'-P-CCNC: 14 U/L
AST SERPL-CCNC: 19 U/L

## 2019-03-22 PROCEDURE — 36415 COLL VENOUS BLD VENIPUNCTURE: CPT

## 2019-03-22 PROCEDURE — 84460 ALANINE AMINO (ALT) (SGPT): CPT

## 2019-03-22 PROCEDURE — 84450 TRANSFERASE (AST) (SGOT): CPT

## 2019-05-27 ENCOUNTER — TELEPHONE (OUTPATIENT)
Dept: INTERNAL MEDICINE | Facility: CLINIC | Age: 69
End: 2019-05-27

## 2019-05-27 RX ORDER — TERBINAFINE HYDROCHLORIDE 250 MG/1
TABLET ORAL
Qty: 90 TABLET | Refills: 0 | OUTPATIENT
Start: 2019-05-27

## 2019-05-27 NOTE — TELEPHONE ENCOUNTER
pls call - she was given 90 tabs of lamisil - that is total course of treatment.  Refill not necessary.  She needs to give new nail time to grow in.

## 2019-05-27 NOTE — TELEPHONE ENCOUNTER
pls call - she was given 90 tabs of lamisil - that is total course of treatment.  Refill not necessary.  She nees to give new nail time to grow in.  pls let pharmacy know refill denied

## 2020-01-20 DIAGNOSIS — M81.0 OSTEOPOROSIS, POST-MENOPAUSAL: Primary | ICD-10-CM

## 2020-01-22 RX ORDER — ALENDRONATE SODIUM 70 MG/1
70 TABLET ORAL
Qty: 12 TABLET | Refills: 0 | Status: SHIPPED | OUTPATIENT
Start: 2020-01-22 | End: 2020-02-26 | Stop reason: SDUPTHER

## 2020-01-22 NOTE — TELEPHONE ENCOUNTER
Please schedule patient for Labs (Vit D/DXA/CMP)    Please also check with your provider if any further labs need to be added and scheduled together.    Thanks !

## 2020-01-22 NOTE — PROGRESS NOTES
Refill Authorization Note     is requesting a refill authorization.    Brief assessment and rationale for refill: APPROVE: needs labs   Name and strength of medication: alendronate (FOSAMAX) 70 MG tablet  Medication-related problems identified: Requires labs    Medication Therapy Plan: NTBO(CMP/Vid D)/NTBS(DXA);  FOVS; approve 3 more     Medication reconciliation completed: No                         Comments:   Requested Prescriptions   Pending Prescriptions Disp Refills    alendronate (FOSAMAX) 70 MG tablet [Pharmacy Med Name: ALENDRONATE  70MG  TAB] 12 tablet 0     Sig: Take 1 tablet (70 mg total) by mouth every 7 days. 30MIN BEFORE FOOD W/8OZ WATER,STAY UPRIGHT FOR 30MIN       Endocrinology:  Bisphosphonates Failed - 1/22/2020 11:21 AM        Failed - DEXA completed in last 720 days     Results for orders placed during the hospital encounter of 10/25/16   DXA Bone Density Spine And Hip_Axial Skeleton 10/25/2016               Failed - Ca in normal range and within 360 days     Calcium   Date Value Ref Range Status   01/16/2019 9.6 8.7 - 10.5 mg/dL Final   01/31/2018 9.6 8.7 - 10.5 mg/dL Final   10/14/2016 9.5 8.7 - 10.5 mg/dL Final              Failed - Vitamin D in normal range and within 360 days     Vit D, 25-Hydroxy   Date Value Ref Range Status   01/11/2018 75 30 - 96 ng/mL Final     Comment:     Vitamin D deficiency.........<10 ng/mL                              Vitamin D insufficiency......10-29 ng/mL       Vitamin D sufficiency........> or equal to 30 ng/mL  Vitamin D toxicity............>100 ng/mL                Failed - Cr is 1.4 or below and within 360 days     Creatinine   Date Value Ref Range Status   01/16/2019 0.7 0.5 - 1.4 mg/dL Final   01/31/2018 0.7 0.5 - 1.4 mg/dL Final   01/11/2018 0.7 0.5 - 1.4 mg/dL Final              Failed - eGFR is 30 or above and within 360 days     eGFR if non    Date Value Ref Range Status   01/16/2019 >60 >60 mL/min/1.73 m^2 Final     Comment:      Calculation used to obtain the estimated glomerular filtration  rate (eGFR) is the CKD-EPI equation.      01/31/2018 >60.0 >60 mL/min/1.73 m^2 Final     Comment:     Calculation used to obtain the estimated glomerular filtration  rate (eGFR) is the CKD-EPI equation.      01/11/2018 >60 >60 mL/min/1.73 m^2 Final     Comment:     Calculation used to obtain the estimated glomerular filtration  rate (eGFR) is the CKD-EPI equation.        eGFR if    Date Value Ref Range Status   01/16/2019 >60 >60 mL/min/1.73 m^2 Final   01/31/2018 >60.0 >60 mL/min/1.73 m^2 Final   01/11/2018 >60 >60 mL/min/1.73 m^2 Final              Passed - Patient is at least 18 years old        Passed - Office visit in past 12 months or future 90 days.     Recent Outpatient Visits            12 months ago Annual physical exam    [facility] Elma Copeland MD    1 year ago Adult bronchiectasis    [facility] Rodo Hinojosa MD    1 year ago Postinflammatory pulmonary fibrosis    [facility] MARYCHUY Crews MD    1 year ago Uterovaginal prolapse    [facility]  Farheen Hopkins NP    1 year ago Postinflammatory pulmonary fibrosis    [facility] MARYCHUY Crews MD          Future Appointments              In 1 month [facility], [facility]                Appointments  past 12m or future 3m with PCP    Date Provider   Last Visit   1/23/2019 Elma Copeland MD   Next Visit   2/26/2020 Elma Copeland MD        Note composed: 01/22/2020

## 2020-02-12 ENCOUNTER — PATIENT OUTREACH (OUTPATIENT)
Dept: ADMINISTRATIVE | Facility: HOSPITAL | Age: 70
End: 2020-02-12

## 2020-02-19 ENCOUNTER — HOSPITAL ENCOUNTER (OUTPATIENT)
Dept: RADIOLOGY | Facility: CLINIC | Age: 70
Discharge: HOME | End: 2020-02-19
Attending: INTERNAL MEDICINE
Payer: COMMERCIAL

## 2020-02-19 DIAGNOSIS — M81.0 OSTEOPOROSIS, POST-MENOPAUSAL: ICD-10-CM

## 2020-02-19 PROCEDURE — 77080 DXA BONE DENSITY AXIAL: CPT | Mod: TC

## 2020-02-19 PROCEDURE — 77080 DXA BONE DENSITY AXIAL: CPT | Mod: 26,,, | Performed by: INTERNAL MEDICINE

## 2020-02-19 PROCEDURE — 77080 DEXA BONE DENSITY SPINE HIP: ICD-10-PCS | Mod: 26,,, | Performed by: INTERNAL MEDICINE

## 2020-02-26 ENCOUNTER — LAB VISIT (OUTPATIENT)
Dept: LAB | Facility: HOSPITAL | Age: 70
End: 2020-02-26
Attending: INTERNAL MEDICINE
Payer: COMMERCIAL

## 2020-02-26 ENCOUNTER — IMMUNIZATION (OUTPATIENT)
Dept: PHARMACY | Facility: CLINIC | Age: 70
End: 2020-02-26
Payer: COMMERCIAL

## 2020-02-26 ENCOUNTER — OFFICE VISIT (OUTPATIENT)
Dept: INTERNAL MEDICINE | Facility: CLINIC | Age: 70
End: 2020-02-26
Payer: COMMERCIAL

## 2020-02-26 VITALS
OXYGEN SATURATION: 98 % | DIASTOLIC BLOOD PRESSURE: 72 MMHG | BODY MASS INDEX: 22.68 KG/M2 | SYSTOLIC BLOOD PRESSURE: 110 MMHG | WEIGHT: 120.13 LBS | HEART RATE: 82 BPM | HEIGHT: 61 IN

## 2020-02-26 DIAGNOSIS — Z00.00 ANNUAL PHYSICAL EXAM: Primary | ICD-10-CM

## 2020-02-26 DIAGNOSIS — M81.0 OSTEOPOROSIS, POST-MENOPAUSAL: ICD-10-CM

## 2020-02-26 DIAGNOSIS — J47.9 ADULT BRONCHIECTASIS: ICD-10-CM

## 2020-02-26 DIAGNOSIS — G47.62 NOCTURNAL LEG CRAMPS: ICD-10-CM

## 2020-02-26 DIAGNOSIS — R93.89 ABNORMAL CXR: ICD-10-CM

## 2020-02-26 DIAGNOSIS — J84.10 POSTINFLAMMATORY PULMONARY FIBROSIS: ICD-10-CM

## 2020-02-26 DIAGNOSIS — Z00.00 ANNUAL PHYSICAL EXAM: ICD-10-CM

## 2020-02-26 PROBLEM — N39.43 POST-VOID DRIBBLING: Status: RESOLVED | Noted: 2018-02-08 | Resolved: 2020-02-26

## 2020-02-26 PROBLEM — R39.15 URINARY URGENCY: Status: RESOLVED | Noted: 2018-02-08 | Resolved: 2020-02-26

## 2020-02-26 LAB
25(OH)D3+25(OH)D2 SERPL-MCNC: 52 NG/ML (ref 30–96)
ALBUMIN SERPL BCP-MCNC: 3.8 G/DL (ref 3.5–5.2)
ALP SERPL-CCNC: 55 U/L (ref 55–135)
ALT SERPL W/O P-5'-P-CCNC: 19 U/L (ref 10–44)
ANION GAP SERPL CALC-SCNC: 6 MMOL/L (ref 8–16)
AST SERPL-CCNC: 26 U/L (ref 10–40)
BASOPHILS # BLD AUTO: 0.03 K/UL (ref 0–0.2)
BASOPHILS NFR BLD: 0.5 % (ref 0–1.9)
BILIRUB SERPL-MCNC: 0.3 MG/DL (ref 0.1–1)
BUN SERPL-MCNC: 18 MG/DL (ref 8–23)
CALCIUM SERPL-MCNC: 9.4 MG/DL (ref 8.7–10.5)
CHLORIDE SERPL-SCNC: 105 MMOL/L (ref 95–110)
CO2 SERPL-SCNC: 29 MMOL/L (ref 23–29)
CREAT SERPL-MCNC: 0.7 MG/DL (ref 0.5–1.4)
CRP SERPL-MCNC: 0.4 MG/L (ref 0–8.2)
DIFFERENTIAL METHOD: ABNORMAL
EOSINOPHIL # BLD AUTO: 0.1 K/UL (ref 0–0.5)
EOSINOPHIL NFR BLD: 2 % (ref 0–8)
ERYTHROCYTE [DISTWIDTH] IN BLOOD BY AUTOMATED COUNT: 13.2 % (ref 11.5–14.5)
ERYTHROCYTE [SEDIMENTATION RATE] IN BLOOD BY WESTERGREN METHOD: 12 MM/HR (ref 0–36)
EST. GFR  (AFRICAN AMERICAN): >60 ML/MIN/1.73 M^2
EST. GFR  (NON AFRICAN AMERICAN): >60 ML/MIN/1.73 M^2
GLUCOSE SERPL-MCNC: 94 MG/DL (ref 70–110)
HCT VFR BLD AUTO: 40.9 % (ref 37–48.5)
HGB BLD-MCNC: 12.8 G/DL (ref 12–16)
IMM GRANULOCYTES # BLD AUTO: 0.02 K/UL (ref 0–0.04)
IMM GRANULOCYTES NFR BLD AUTO: 0.4 % (ref 0–0.5)
LYMPHOCYTES # BLD AUTO: 1.5 K/UL (ref 1–4.8)
LYMPHOCYTES NFR BLD: 27.3 % (ref 18–48)
MCH RBC QN AUTO: 31.8 PG (ref 27–31)
MCHC RBC AUTO-ENTMCNC: 31.3 G/DL (ref 32–36)
MCV RBC AUTO: 102 FL (ref 82–98)
MONOCYTES # BLD AUTO: 0.4 K/UL (ref 0.3–1)
MONOCYTES NFR BLD: 6.4 % (ref 4–15)
NEUTROPHILS # BLD AUTO: 3.6 K/UL (ref 1.8–7.7)
NEUTROPHILS NFR BLD: 63.4 % (ref 38–73)
NRBC BLD-RTO: 0 /100 WBC
PLATELET # BLD AUTO: 203 K/UL (ref 150–350)
PMV BLD AUTO: 10.5 FL (ref 9.2–12.9)
POTASSIUM SERPL-SCNC: 4 MMOL/L (ref 3.5–5.1)
PROT SERPL-MCNC: 7.3 G/DL (ref 6–8.4)
RBC # BLD AUTO: 4.03 M/UL (ref 4–5.4)
SODIUM SERPL-SCNC: 140 MMOL/L (ref 136–145)
WBC # BLD AUTO: 5.6 K/UL (ref 3.9–12.7)

## 2020-02-26 PROCEDURE — 86140 C-REACTIVE PROTEIN: CPT

## 2020-02-26 PROCEDURE — 85025 COMPLETE CBC W/AUTO DIFF WBC: CPT

## 2020-02-26 PROCEDURE — 82306 VITAMIN D 25 HYDROXY: CPT

## 2020-02-26 PROCEDURE — 99397 PR PREVENTIVE VISIT,EST,65 & OVER: ICD-10-PCS | Mod: S$GLB,,, | Performed by: INTERNAL MEDICINE

## 2020-02-26 PROCEDURE — 86481 TB AG RESPONSE T-CELL SUSP: CPT

## 2020-02-26 PROCEDURE — 99999 PR PBB SHADOW E&M-EST. PATIENT-LVL III: CPT | Mod: PBBFAC,,, | Performed by: INTERNAL MEDICINE

## 2020-02-26 PROCEDURE — 85652 RBC SED RATE AUTOMATED: CPT

## 2020-02-26 PROCEDURE — 36415 COLL VENOUS BLD VENIPUNCTURE: CPT

## 2020-02-26 PROCEDURE — 80053 COMPREHEN METABOLIC PANEL: CPT

## 2020-02-26 PROCEDURE — 99397 PER PM REEVAL EST PAT 65+ YR: CPT | Mod: S$GLB,,, | Performed by: INTERNAL MEDICINE

## 2020-02-26 PROCEDURE — 99999 PR PBB SHADOW E&M-EST. PATIENT-LVL III: ICD-10-PCS | Mod: PBBFAC,,, | Performed by: INTERNAL MEDICINE

## 2020-02-26 PROCEDURE — 86677 HELICOBACTER PYLORI ANTIBODY: CPT

## 2020-02-26 RX ORDER — ALENDRONATE SODIUM 70 MG/1
70 TABLET ORAL
Qty: 12 TABLET | Refills: 3 | Status: SHIPPED | OUTPATIENT
Start: 2020-02-26 | End: 2020-12-15

## 2020-02-26 NOTE — PROGRESS NOTES
Subjective:       Patient ID: Luly Lora is a 69 y.o. female.    Chief Complaint: Annual Exam    HPI   C/o occas stomach irritation, intermittent, for about a week at a time.    Currently asymptomatic.  Burning in epigastrium.  At first she was worried that she was having angina, so she took 2 of her 's NTG, but discomfort did not resolve.  No assoc sob, LUE pain.  No n, v, melena.  No exertional CP.    Osteoporosis, tx with fosamax.  No dysphagia.           Nails improved with Lamisil.    She has h/o bronchiectasis and presumed FAYE..  She declined f/u CT.  No cough, sob.  She agrees to t spot today.  Pulm notes reviewed.    occas leg cramps.   Review of Systems   Constitutional: Negative for activity change and unexpected weight change.   HENT: Negative for hearing loss, rhinorrhea and trouble swallowing.    Eyes: Negative for discharge and visual disturbance.   Respiratory: Negative for chest tightness and wheezing.    Cardiovascular: Negative for chest pain and palpitations.   Gastrointestinal: Negative for blood in stool, constipation, diarrhea and vomiting.   Endocrine: Negative for polydipsia and polyuria.   Genitourinary: Negative for difficulty urinating, dysuria, hematuria and menstrual problem.   Musculoskeletal: Negative for arthralgias, joint swelling and neck pain.   Neurological: Negative for weakness and headaches.   Psychiatric/Behavioral: Negative for confusion and dysphoric mood.       Objective:      Physical Exam   Constitutional: She is oriented to person, place, and time. She appears well-developed and well-nourished. No distress.   HENT:   Head: Normocephalic and atraumatic.   Right Ear: External ear normal.   Left Ear: External ear normal.   Nose: Nose normal.   Mouth/Throat: Oropharynx is clear and moist.   Eyes: Pupils are equal, round, and reactive to light. Conjunctivae and EOM are normal. Right eye exhibits no discharge. Left eye exhibits no discharge. No scleral icterus.   Neck:  Normal range of motion. Neck supple. No JVD present. No thyromegaly present.   Cardiovascular: Normal rate, regular rhythm and normal heart sounds. Exam reveals no gallop.   No murmur heard.  Pulmonary/Chest: Effort normal and breath sounds normal. No respiratory distress. She has no wheezes. She has no rales. No breast tenderness, discharge or bleeding.   Abdominal: Soft. Bowel sounds are normal. She exhibits no distension and no mass. There is no tenderness. There is no rebound and no guarding.   Genitourinary: No breast tenderness, discharge or bleeding.   Musculoskeletal: Normal range of motion. She exhibits no edema or tenderness.   Lymphadenopathy:     She has no cervical adenopathy.   Neurological: She is alert and oriented to person, place, and time. No cranial nerve deficit. Coordination normal.   Skin: Skin is warm and dry. No rash noted.   Psychiatric: She has a normal mood and affect. Her behavior is normal. Judgment and thought content normal.       Assessment:       1. Annual physical exam    2. Osteoporosis, post-menopausal    3. Nocturnal leg cramps    4. Abnormal CXR    5. Postinflammatory pulmonary fibrosis    6. Adult bronchiectasis        Plan:       Luly was seen today for annual exam.    Diagnoses and all orders for this visit:    Annual physical exam  -     CBC auto differential; Future  -     Comprehensive metabolic panel; Future  -     Vitamin D; Future  -     H.Pylori Antibody IgG; Future    Osteoporosis, post-menopausal    Nocturnal leg cramps    Abnormal CXR  -     T-SPOT TB Screening Test; Future  -     Sedimentation rate; Future  -     C-reactive protein; Future    Postinflammatory pulmonary fibrosis    Adult bronchiectasis       She defers MG 1 year.  Tdap now  Shingles vaccine elsewhere  Tagamet, pepcid prn   Call if symptoms are persistent.  Dexa pending.  Preliminary results reviewed.

## 2020-02-27 LAB — H PYLORI IGG SERPL QL IA: POSITIVE

## 2020-03-02 LAB — T-SPOT TB SCREENING TEST: NORMAL

## 2020-03-23 RX ORDER — AMOXICILLIN 500 MG/1
1000 TABLET, FILM COATED ORAL EVERY 12 HOURS
Qty: 56 TABLET | Refills: 0 | Status: SHIPPED | OUTPATIENT
Start: 2020-03-23 | End: 2020-04-06

## 2020-03-23 RX ORDER — CLARITHROMYCIN 500 MG/1
500 TABLET, FILM COATED ORAL EVERY 12 HOURS
Qty: 28 TABLET | Refills: 0 | Status: SHIPPED | OUTPATIENT
Start: 2020-03-23 | End: 2020-04-06

## 2020-03-24 ENCOUNTER — TELEPHONE (OUTPATIENT)
Dept: INTERNAL MEDICINE | Facility: CLINIC | Age: 70
End: 2020-03-24

## 2020-03-24 NOTE — TELEPHONE ENCOUNTER
----- Message from Elma Copeland MD sent at 3/23/2020  5:33 PM CDT -----  Pls make sure she reads:    Ms Lora - Tests all fine, but you do have H pylori, a bacteria which can cause stomach upset.     I would recommend treatment with Amoxil 1 g twice a day                                                               Clarithromycin 500 mg bid                                                                 Prilosec 20 mg daily (over the counter) - all for 14 days.  Stop tagamet/pepcid while taking prilosec.      Let me know if your abdominal pain doesn't improve.

## 2020-12-14 DIAGNOSIS — M81.0 OSTEOPOROSIS, POST-MENOPAUSAL: ICD-10-CM

## 2020-12-15 RX ORDER — ALENDRONATE SODIUM 70 MG/1
TABLET ORAL
Qty: 12 TABLET | Refills: 0 | Status: SHIPPED | OUTPATIENT
Start: 2020-12-15 | End: 2021-03-30 | Stop reason: SDUPTHER

## 2020-12-15 NOTE — PROGRESS NOTES
Refill Routing Note   Medication(s) are not appropriate for processing by [facility] for the following reason(s):     - Outside of Protocol  [facility] action(s):   Route          Medication reconciliation completed: No   Automatic Epic Generated Protocol Data:        Requested Prescriptions   Pending Prescriptions Disp Refills    alendronate (FOSAMAX) 70 MG tablet [Pharmacy Med Name: ALENDRONATE  70MG  TAB] 12 tablet 0     Sig: TAKE 1 TABLET BY MOUTH  WEEKLY WITH 8 OZ OF PLAIN  WATER 30 MINUTES BEFORE  FIRST FOOD, DRINK OR MEDS.  STAY UPRIGHT FOR 30 MINS       There is no refill protocol information for this order           Appointments  past 12m or future 3m with PCP    Date Provider   Last Visit   2/26/2020 Elma Copeland MD   Next Visit   Visit date not found Elma Copeland MD   ED visits in past 90 days: 0        Note composed:10:49 AM 12/15/2020

## 2021-01-13 DIAGNOSIS — Z12.31 OTHER SCREENING MAMMOGRAM: ICD-10-CM

## 2021-03-30 DIAGNOSIS — M81.0 OSTEOPOROSIS, POST-MENOPAUSAL: ICD-10-CM

## 2021-03-30 RX ORDER — ALENDRONATE SODIUM 70 MG/1
TABLET ORAL
Qty: 12 TABLET | Refills: 0 | Status: SHIPPED | OUTPATIENT
Start: 2021-03-30 | End: 2021-05-26 | Stop reason: SDUPTHER

## 2021-04-05 ENCOUNTER — PATIENT MESSAGE (OUTPATIENT)
Dept: ADMINISTRATIVE | Facility: HOSPITAL | Age: 71
End: 2021-04-05

## 2021-04-26 ENCOUNTER — PATIENT MESSAGE (OUTPATIENT)
Dept: RESEARCH | Facility: HOSPITAL | Age: 71
End: 2021-04-26

## 2021-05-03 ENCOUNTER — HOSPITAL ENCOUNTER (OUTPATIENT)
Dept: RADIOLOGY | Facility: HOSPITAL | Age: 71
Discharge: HOME | End: 2021-05-03
Attending: INTERNAL MEDICINE
Payer: COMMERCIAL

## 2021-05-03 VITALS — BODY MASS INDEX: 22.7 KG/M2 | WEIGHT: 120.13 LBS

## 2021-05-03 DIAGNOSIS — Z12.31 OTHER SCREENING MAMMOGRAM: ICD-10-CM

## 2021-05-03 PROCEDURE — 77067 SCR MAMMO BI INCL CAD: CPT | Mod: TC,PN

## 2021-05-03 PROCEDURE — 77067 MAMMO DIGITAL SCREENING BILAT WITH TOMO: ICD-10-PCS | Mod: 26,,, | Performed by: RADIOLOGY

## 2021-05-03 PROCEDURE — 77063 MAMMO DIGITAL SCREENING BILAT WITH TOMO: ICD-10-PCS | Mod: 26,,, | Performed by: RADIOLOGY

## 2021-05-03 PROCEDURE — 77063 BREAST TOMOSYNTHESIS BI: CPT | Mod: 26,,, | Performed by: RADIOLOGY

## 2021-05-03 PROCEDURE — 77067 SCR MAMMO BI INCL CAD: CPT | Mod: 26,,, | Performed by: RADIOLOGY

## 2021-05-26 ENCOUNTER — OFFICE VISIT (OUTPATIENT)
Dept: INTERNAL MEDICINE | Facility: CLINIC | Age: 71
End: 2021-05-26
Payer: COMMERCIAL

## 2021-05-26 ENCOUNTER — LAB VISIT (OUTPATIENT)
Dept: LAB | Facility: HOSPITAL | Age: 71
End: 2021-05-26
Attending: INTERNAL MEDICINE
Payer: COMMERCIAL

## 2021-05-26 VITALS
BODY MASS INDEX: 20.39 KG/M2 | WEIGHT: 108 LBS | DIASTOLIC BLOOD PRESSURE: 66 MMHG | HEART RATE: 55 BPM | HEIGHT: 61 IN | OXYGEN SATURATION: 99 % | SYSTOLIC BLOOD PRESSURE: 110 MMHG

## 2021-05-26 DIAGNOSIS — J47.9 ADULT BRONCHIECTASIS: ICD-10-CM

## 2021-05-26 DIAGNOSIS — Z00.00 ANNUAL PHYSICAL EXAM: ICD-10-CM

## 2021-05-26 DIAGNOSIS — Z00.00 ANNUAL PHYSICAL EXAM: Primary | ICD-10-CM

## 2021-05-26 DIAGNOSIS — R07.89 ATYPICAL CHEST PAIN: ICD-10-CM

## 2021-05-26 DIAGNOSIS — Z12.11 COLON CANCER SCREENING: ICD-10-CM

## 2021-05-26 DIAGNOSIS — M81.0 OSTEOPOROSIS, POST-MENOPAUSAL: ICD-10-CM

## 2021-05-26 PROBLEM — J84.10 POSTINFLAMMATORY PULMONARY FIBROSIS: Status: RESOLVED | Noted: 2018-01-31 | Resolved: 2021-05-26

## 2021-05-26 LAB
25(OH)D3+25(OH)D2 SERPL-MCNC: 87 NG/ML (ref 30–96)
ALBUMIN SERPL BCP-MCNC: 3.6 G/DL (ref 3.5–5.2)
ALP SERPL-CCNC: 58 U/L (ref 55–135)
ALT SERPL W/O P-5'-P-CCNC: 17 U/L (ref 10–44)
ANION GAP SERPL CALC-SCNC: 10 MMOL/L (ref 8–16)
AST SERPL-CCNC: 19 U/L (ref 10–40)
BASOPHILS # BLD AUTO: 0.02 K/UL (ref 0–0.2)
BASOPHILS NFR BLD: 0.4 % (ref 0–1.9)
BILIRUB SERPL-MCNC: 0.3 MG/DL (ref 0.1–1)
BUN SERPL-MCNC: 14 MG/DL (ref 8–23)
CALCIUM SERPL-MCNC: 9.7 MG/DL (ref 8.7–10.5)
CHLORIDE SERPL-SCNC: 104 MMOL/L (ref 95–110)
CHOLEST SERPL-MCNC: 189 MG/DL (ref 120–199)
CHOLEST/HDLC SERPL: 3.8 {RATIO} (ref 2–5)
CO2 SERPL-SCNC: 25 MMOL/L (ref 23–29)
CREAT SERPL-MCNC: 0.7 MG/DL (ref 0.5–1.4)
DIFFERENTIAL METHOD: ABNORMAL
EOSINOPHIL # BLD AUTO: 0 K/UL (ref 0–0.5)
EOSINOPHIL NFR BLD: 0.6 % (ref 0–8)
ERYTHROCYTE [DISTWIDTH] IN BLOOD BY AUTOMATED COUNT: 13.4 % (ref 11.5–14.5)
EST. GFR  (AFRICAN AMERICAN): >60 ML/MIN/1.73 M^2
EST. GFR  (NON AFRICAN AMERICAN): >60 ML/MIN/1.73 M^2
GLUCOSE SERPL-MCNC: 82 MG/DL (ref 70–110)
HCT VFR BLD AUTO: 39 % (ref 37–48.5)
HDLC SERPL-MCNC: 50 MG/DL (ref 40–75)
HDLC SERPL: 26.5 % (ref 20–50)
HGB BLD-MCNC: 12.3 G/DL (ref 12–16)
IMM GRANULOCYTES # BLD AUTO: 0.02 K/UL (ref 0–0.04)
IMM GRANULOCYTES NFR BLD AUTO: 0.4 % (ref 0–0.5)
LDLC SERPL CALC-MCNC: 112 MG/DL (ref 63–159)
LYMPHOCYTES # BLD AUTO: 1.5 K/UL (ref 1–4.8)
LYMPHOCYTES NFR BLD: 27.9 % (ref 18–48)
MCH RBC QN AUTO: 30.7 PG (ref 27–31)
MCHC RBC AUTO-ENTMCNC: 31.5 G/DL (ref 32–36)
MCV RBC AUTO: 97 FL (ref 82–98)
MONOCYTES # BLD AUTO: 0.4 K/UL (ref 0.3–1)
MONOCYTES NFR BLD: 8.2 % (ref 4–15)
NEUTROPHILS # BLD AUTO: 3.3 K/UL (ref 1.8–7.7)
NEUTROPHILS NFR BLD: 62.5 % (ref 38–73)
NONHDLC SERPL-MCNC: 139 MG/DL
NRBC BLD-RTO: 0 /100 WBC
PLATELET # BLD AUTO: 185 K/UL (ref 150–450)
PMV BLD AUTO: 11.2 FL (ref 9.2–12.9)
POTASSIUM SERPL-SCNC: 4.2 MMOL/L (ref 3.5–5.1)
PROT SERPL-MCNC: 7.3 G/DL (ref 6–8.4)
RBC # BLD AUTO: 4.01 M/UL (ref 4–5.4)
SODIUM SERPL-SCNC: 139 MMOL/L (ref 136–145)
TRIGL SERPL-MCNC: 135 MG/DL (ref 30–150)
WBC # BLD AUTO: 5.27 K/UL (ref 3.9–12.7)

## 2021-05-26 PROCEDURE — 82306 VITAMIN D 25 HYDROXY: CPT | Performed by: INTERNAL MEDICINE

## 2021-05-26 PROCEDURE — 80053 COMPREHEN METABOLIC PANEL: CPT | Performed by: INTERNAL MEDICINE

## 2021-05-26 PROCEDURE — 99999 PR PBB SHADOW E&M-EST. PATIENT-LVL III: CPT | Mod: PBBFAC,,, | Performed by: INTERNAL MEDICINE

## 2021-05-26 PROCEDURE — 3008F BODY MASS INDEX DOCD: CPT | Mod: CPTII,S$GLB,, | Performed by: INTERNAL MEDICINE

## 2021-05-26 PROCEDURE — 99397 PER PM REEVAL EST PAT 65+ YR: CPT | Mod: S$GLB,,, | Performed by: INTERNAL MEDICINE

## 2021-05-26 PROCEDURE — 93005 ELECTROCARDIOGRAM TRACING: CPT | Mod: S$GLB,,, | Performed by: INTERNAL MEDICINE

## 2021-05-26 PROCEDURE — 36415 COLL VENOUS BLD VENIPUNCTURE: CPT | Performed by: INTERNAL MEDICINE

## 2021-05-26 PROCEDURE — 99397 PR PREVENTIVE VISIT,EST,65 & OVER: ICD-10-PCS | Mod: S$GLB,,, | Performed by: INTERNAL MEDICINE

## 2021-05-26 PROCEDURE — 1101F PR PT FALLS ASSESS DOC 0-1 FALLS W/OUT INJ PAST YR: ICD-10-PCS | Mod: CPTII,S$GLB,, | Performed by: INTERNAL MEDICINE

## 2021-05-26 PROCEDURE — 93005 EKG 12-LEAD: ICD-10-PCS | Mod: S$GLB,,, | Performed by: INTERNAL MEDICINE

## 2021-05-26 PROCEDURE — 3008F PR BODY MASS INDEX (BMI) DOCUMENTED: ICD-10-PCS | Mod: CPTII,S$GLB,, | Performed by: INTERNAL MEDICINE

## 2021-05-26 PROCEDURE — 3288F PR FALLS RISK ASSESSMENT DOCUMENTED: ICD-10-PCS | Mod: CPTII,S$GLB,, | Performed by: INTERNAL MEDICINE

## 2021-05-26 PROCEDURE — 1126F PR PAIN SEVERITY QUANTIFIED, NO PAIN PRESENT: ICD-10-PCS | Mod: S$GLB,,, | Performed by: INTERNAL MEDICINE

## 2021-05-26 PROCEDURE — 85025 COMPLETE CBC W/AUTO DIFF WBC: CPT | Performed by: INTERNAL MEDICINE

## 2021-05-26 PROCEDURE — 93010 EKG 12-LEAD: ICD-10-PCS | Mod: S$GLB,,, | Performed by: INTERNAL MEDICINE

## 2021-05-26 PROCEDURE — 3288F FALL RISK ASSESSMENT DOCD: CPT | Mod: CPTII,S$GLB,, | Performed by: INTERNAL MEDICINE

## 2021-05-26 PROCEDURE — 99999 PR PBB SHADOW E&M-EST. PATIENT-LVL III: ICD-10-PCS | Mod: PBBFAC,,, | Performed by: INTERNAL MEDICINE

## 2021-05-26 PROCEDURE — 80061 LIPID PANEL: CPT | Performed by: INTERNAL MEDICINE

## 2021-05-26 PROCEDURE — 93010 ELECTROCARDIOGRAM REPORT: CPT | Mod: S$GLB,,, | Performed by: INTERNAL MEDICINE

## 2021-05-26 PROCEDURE — 1101F PT FALLS ASSESS-DOCD LE1/YR: CPT | Mod: CPTII,S$GLB,, | Performed by: INTERNAL MEDICINE

## 2021-05-26 PROCEDURE — 1126F AMNT PAIN NOTED NONE PRSNT: CPT | Mod: S$GLB,,, | Performed by: INTERNAL MEDICINE

## 2021-05-26 RX ORDER — ALENDRONATE SODIUM 70 MG/1
TABLET ORAL
Qty: 12 TABLET | Refills: 3 | Status: SHIPPED | OUTPATIENT
Start: 2021-05-26 | End: 2022-05-30

## 2021-07-06 ENCOUNTER — PATIENT MESSAGE (OUTPATIENT)
Dept: ADMINISTRATIVE | Facility: HOSPITAL | Age: 71
End: 2021-07-06

## 2021-10-04 ENCOUNTER — PATIENT MESSAGE (OUTPATIENT)
Dept: ADMINISTRATIVE | Facility: HOSPITAL | Age: 71
End: 2021-10-04

## 2021-10-13 ENCOUNTER — LAB VISIT (OUTPATIENT)
Dept: LAB | Facility: HOSPITAL | Age: 71
End: 2021-10-13
Attending: INTERNAL MEDICINE
Payer: COMMERCIAL

## 2021-10-13 DIAGNOSIS — Z12.11 COLON CANCER SCREENING: ICD-10-CM

## 2021-10-13 PROCEDURE — 82274 ASSAY TEST FOR BLOOD FECAL: CPT | Performed by: INTERNAL MEDICINE

## 2021-10-15 ENCOUNTER — PATIENT OUTREACH (OUTPATIENT)
Dept: ADMINISTRATIVE | Facility: OTHER | Age: 71
End: 2021-10-15

## 2021-10-15 ENCOUNTER — PATIENT MESSAGE (OUTPATIENT)
Dept: ADMINISTRATIVE | Facility: OTHER | Age: 71
End: 2021-10-15
Payer: COMMERCIAL

## 2021-10-16 LAB — HEMOCCULT STL QL IA: NEGATIVE

## 2022-05-13 ENCOUNTER — HOSPITAL ENCOUNTER (OUTPATIENT)
Dept: RADIOLOGY | Facility: CLINIC | Age: 72
Discharge: HOME | End: 2022-05-13
Attending: INTERNAL MEDICINE
Payer: COMMERCIAL

## 2022-05-13 DIAGNOSIS — M81.0 OSTEOPOROSIS, POST-MENOPAUSAL: ICD-10-CM

## 2022-05-13 PROCEDURE — 77080 DEXA BONE DENSITY SPINE HIP: ICD-10-PCS | Mod: 26,,, | Performed by: INTERNAL MEDICINE

## 2022-05-13 PROCEDURE — 77080 DXA BONE DENSITY AXIAL: CPT | Mod: TC

## 2022-05-13 PROCEDURE — 77080 DXA BONE DENSITY AXIAL: CPT | Mod: 26,,, | Performed by: INTERNAL MEDICINE

## 2022-05-18 DIAGNOSIS — Z12.31 OTHER SCREENING MAMMOGRAM: ICD-10-CM

## 2022-06-15 ENCOUNTER — HOSPITAL ENCOUNTER (OUTPATIENT)
Dept: RADIOLOGY | Facility: HOSPITAL | Age: 72
Discharge: HOME | End: 2022-06-15
Attending: INTERNAL MEDICINE
Payer: COMMERCIAL

## 2022-06-15 VITALS — HEIGHT: 61 IN | WEIGHT: 108 LBS | BODY MASS INDEX: 20.39 KG/M2

## 2022-06-15 DIAGNOSIS — Z12.31 OTHER SCREENING MAMMOGRAM: ICD-10-CM

## 2022-06-15 PROCEDURE — 77063 BREAST TOMOSYNTHESIS BI: CPT | Mod: 26,,, | Performed by: RADIOLOGY

## 2022-06-15 PROCEDURE — 77067 MAMMO DIGITAL SCREENING BILAT WITH TOMO: ICD-10-PCS | Mod: 26,,, | Performed by: RADIOLOGY

## 2022-06-15 PROCEDURE — 77067 SCR MAMMO BI INCL CAD: CPT | Mod: TC

## 2022-06-15 PROCEDURE — 77063 BREAST TOMOSYNTHESIS BI: CPT | Mod: TC

## 2022-06-15 PROCEDURE — 77067 SCR MAMMO BI INCL CAD: CPT | Mod: 26,,, | Performed by: RADIOLOGY

## 2022-06-15 PROCEDURE — 77063 MAMMO DIGITAL SCREENING BILAT WITH TOMO: ICD-10-PCS | Mod: 26,,, | Performed by: RADIOLOGY

## 2022-07-20 ENCOUNTER — OFFICE VISIT (OUTPATIENT)
Dept: INTERNAL MEDICINE | Facility: CLINIC | Age: 72
End: 2022-07-20
Payer: COMMERCIAL

## 2022-07-20 ENCOUNTER — LAB VISIT (OUTPATIENT)
Dept: LAB | Facility: HOSPITAL | Age: 72
End: 2022-07-20
Attending: INTERNAL MEDICINE
Payer: COMMERCIAL

## 2022-07-20 VITALS
SYSTOLIC BLOOD PRESSURE: 110 MMHG | HEIGHT: 61 IN | DIASTOLIC BLOOD PRESSURE: 72 MMHG | HEART RATE: 59 BPM | WEIGHT: 107.56 LBS | BODY MASS INDEX: 20.31 KG/M2 | OXYGEN SATURATION: 96 %

## 2022-07-20 DIAGNOSIS — Z00.00 ANNUAL PHYSICAL EXAM: ICD-10-CM

## 2022-07-20 DIAGNOSIS — M81.0 OSTEOPOROSIS, POST-MENOPAUSAL: ICD-10-CM

## 2022-07-20 DIAGNOSIS — Z00.00 ANNUAL PHYSICAL EXAM: Primary | ICD-10-CM

## 2022-07-20 LAB
25(OH)D3+25(OH)D2 SERPL-MCNC: 67 NG/ML (ref 30–96)
ALBUMIN SERPL BCP-MCNC: 3.8 G/DL (ref 3.5–5.2)
ALP SERPL-CCNC: 57 U/L (ref 55–135)
ALT SERPL W/O P-5'-P-CCNC: 12 U/L (ref 10–44)
ANION GAP SERPL CALC-SCNC: 7 MMOL/L (ref 8–16)
AST SERPL-CCNC: 18 U/L (ref 10–40)
BILIRUB SERPL-MCNC: 0.4 MG/DL (ref 0.1–1)
BUN SERPL-MCNC: 12 MG/DL (ref 8–23)
CALCIUM SERPL-MCNC: 9.3 MG/DL (ref 8.7–10.5)
CHLORIDE SERPL-SCNC: 105 MMOL/L (ref 95–110)
CHOLEST SERPL-MCNC: 200 MG/DL (ref 120–199)
CHOLEST/HDLC SERPL: 3.5 {RATIO} (ref 2–5)
CO2 SERPL-SCNC: 28 MMOL/L (ref 23–29)
CREAT SERPL-MCNC: 0.7 MG/DL (ref 0.5–1.4)
ERYTHROCYTE [DISTWIDTH] IN BLOOD BY AUTOMATED COUNT: 13.2 % (ref 11.5–14.5)
EST. GFR  (AFRICAN AMERICAN): >60 ML/MIN/1.73 M^2
EST. GFR  (NON AFRICAN AMERICAN): >60 ML/MIN/1.73 M^2
GLUCOSE SERPL-MCNC: 84 MG/DL (ref 70–110)
HCT VFR BLD AUTO: 38.1 % (ref 37–48.5)
HDLC SERPL-MCNC: 57 MG/DL (ref 40–75)
HDLC SERPL: 28.5 % (ref 20–50)
HGB BLD-MCNC: 12.2 G/DL (ref 12–16)
LDLC SERPL CALC-MCNC: 127 MG/DL (ref 63–159)
MCH RBC QN AUTO: 30.7 PG (ref 27–31)
MCHC RBC AUTO-ENTMCNC: 32 G/DL (ref 32–36)
MCV RBC AUTO: 96 FL (ref 82–98)
NONHDLC SERPL-MCNC: 143 MG/DL
PLATELET # BLD AUTO: 203 K/UL (ref 150–450)
PMV BLD AUTO: 10.4 FL (ref 9.2–12.9)
POTASSIUM SERPL-SCNC: 4 MMOL/L (ref 3.5–5.1)
PROT SERPL-MCNC: 7.1 G/DL (ref 6–8.4)
PTH-INTACT SERPL-MCNC: 49.3 PG/ML (ref 9–77)
RBC # BLD AUTO: 3.97 M/UL (ref 4–5.4)
SODIUM SERPL-SCNC: 140 MMOL/L (ref 136–145)
TRIGL SERPL-MCNC: 80 MG/DL (ref 30–150)
WBC # BLD AUTO: 5.47 K/UL (ref 3.9–12.7)

## 2022-07-20 PROCEDURE — 3078F DIAST BP <80 MM HG: CPT | Mod: CPTII,S$GLB,, | Performed by: INTERNAL MEDICINE

## 2022-07-20 PROCEDURE — 80053 COMPREHEN METABOLIC PANEL: CPT | Performed by: INTERNAL MEDICINE

## 2022-07-20 PROCEDURE — 1159F MED LIST DOCD IN RCRD: CPT | Mod: CPTII,S$GLB,, | Performed by: INTERNAL MEDICINE

## 2022-07-20 PROCEDURE — 82306 VITAMIN D 25 HYDROXY: CPT | Performed by: INTERNAL MEDICINE

## 2022-07-20 PROCEDURE — 1160F RVW MEDS BY RX/DR IN RCRD: CPT | Mod: CPTII,S$GLB,, | Performed by: INTERNAL MEDICINE

## 2022-07-20 PROCEDURE — 3288F FALL RISK ASSESSMENT DOCD: CPT | Mod: CPTII,S$GLB,, | Performed by: INTERNAL MEDICINE

## 2022-07-20 PROCEDURE — 1160F PR REVIEW ALL MEDS BY PRESCRIBER/CLIN PHARMACIST DOCUMENTED: ICD-10-PCS | Mod: CPTII,S$GLB,, | Performed by: INTERNAL MEDICINE

## 2022-07-20 PROCEDURE — 3074F SYST BP LT 130 MM HG: CPT | Mod: CPTII,S$GLB,, | Performed by: INTERNAL MEDICINE

## 2022-07-20 PROCEDURE — 99999 PR PBB SHADOW E&M-EST. PATIENT-LVL III: CPT | Mod: PBBFAC,,, | Performed by: INTERNAL MEDICINE

## 2022-07-20 PROCEDURE — 80061 LIPID PANEL: CPT | Performed by: INTERNAL MEDICINE

## 2022-07-20 PROCEDURE — 3288F PR FALLS RISK ASSESSMENT DOCUMENTED: ICD-10-PCS | Mod: CPTII,S$GLB,, | Performed by: INTERNAL MEDICINE

## 2022-07-20 PROCEDURE — 84165 PROTEIN E-PHORESIS SERUM: CPT | Performed by: INTERNAL MEDICINE

## 2022-07-20 PROCEDURE — 85027 COMPLETE CBC AUTOMATED: CPT | Performed by: INTERNAL MEDICINE

## 2022-07-20 PROCEDURE — 3008F PR BODY MASS INDEX (BMI) DOCUMENTED: ICD-10-PCS | Mod: CPTII,S$GLB,, | Performed by: INTERNAL MEDICINE

## 2022-07-20 PROCEDURE — 84165 PROTEIN E-PHORESIS SERUM: CPT | Mod: 26,,, | Performed by: PATHOLOGY

## 2022-07-20 PROCEDURE — 1159F PR MEDICATION LIST DOCUMENTED IN MEDICAL RECORD: ICD-10-PCS | Mod: CPTII,S$GLB,, | Performed by: INTERNAL MEDICINE

## 2022-07-20 PROCEDURE — 3008F BODY MASS INDEX DOCD: CPT | Mod: CPTII,S$GLB,, | Performed by: INTERNAL MEDICINE

## 2022-07-20 PROCEDURE — 99397 PR PREVENTIVE VISIT,EST,65 & OVER: ICD-10-PCS | Mod: S$GLB,,, | Performed by: INTERNAL MEDICINE

## 2022-07-20 PROCEDURE — 36415 COLL VENOUS BLD VENIPUNCTURE: CPT | Performed by: INTERNAL MEDICINE

## 2022-07-20 PROCEDURE — 99397 PER PM REEVAL EST PAT 65+ YR: CPT | Mod: S$GLB,,, | Performed by: INTERNAL MEDICINE

## 2022-07-20 PROCEDURE — 84165 PATHOLOGIST INTERPRETATION SPE: ICD-10-PCS | Mod: 26,,, | Performed by: PATHOLOGY

## 2022-07-20 PROCEDURE — 1101F PR PT FALLS ASSESS DOC 0-1 FALLS W/OUT INJ PAST YR: ICD-10-PCS | Mod: CPTII,S$GLB,, | Performed by: INTERNAL MEDICINE

## 2022-07-20 PROCEDURE — 3078F PR MOST RECENT DIASTOLIC BLOOD PRESSURE < 80 MM HG: ICD-10-PCS | Mod: CPTII,S$GLB,, | Performed by: INTERNAL MEDICINE

## 2022-07-20 PROCEDURE — 1101F PT FALLS ASSESS-DOCD LE1/YR: CPT | Mod: CPTII,S$GLB,, | Performed by: INTERNAL MEDICINE

## 2022-07-20 PROCEDURE — 3074F PR MOST RECENT SYSTOLIC BLOOD PRESSURE < 130 MM HG: ICD-10-PCS | Mod: CPTII,S$GLB,, | Performed by: INTERNAL MEDICINE

## 2022-07-20 PROCEDURE — 1126F AMNT PAIN NOTED NONE PRSNT: CPT | Mod: CPTII,S$GLB,, | Performed by: INTERNAL MEDICINE

## 2022-07-20 PROCEDURE — 1126F PR PAIN SEVERITY QUANTIFIED, NO PAIN PRESENT: ICD-10-PCS | Mod: CPTII,S$GLB,, | Performed by: INTERNAL MEDICINE

## 2022-07-20 PROCEDURE — 83970 ASSAY OF PARATHORMONE: CPT | Performed by: INTERNAL MEDICINE

## 2022-07-20 PROCEDURE — 99999 PR PBB SHADOW E&M-EST. PATIENT-LVL III: ICD-10-PCS | Mod: PBBFAC,,, | Performed by: INTERNAL MEDICINE
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
## 2022-07-21 LAB
ALBUMIN SERPL ELPH-MCNC: 3.92 G/DL (ref 3.35–5.55)
ALPHA1 GLOB SERPL ELPH-MCNC: 0.27 G/DL (ref 0.17–0.41)
ALPHA2 GLOB SERPL ELPH-MCNC: 0.63 G/DL (ref 0.43–0.99)
B-GLOBULIN SERPL ELPH-MCNC: 1 G/DL (ref 0.5–1.1)
GAMMA GLOB SERPL ELPH-MCNC: 1.39 G/DL (ref 0.67–1.58)
PATHOLOGIST INTERPRETATION SPE: NORMAL
PROT SERPL-MCNC: 7.2 G/DL (ref 6–8.4)

## 2022-07-24 RX ORDER — ZOLEDRONIC ACID 5 MG/100ML
5 INJECTION, SOLUTION INTRAVENOUS
Status: CANCELLED | OUTPATIENT
Start: 2022-08-24

## 2022-07-24 RX ORDER — HEPARIN 100 UNIT/ML
500 SYRINGE INTRAVENOUS
Status: CANCELLED | OUTPATIENT
Start: 2022-08-24

## 2022-07-24 RX ORDER — ACETAMINOPHEN 500 MG
500 TABLET ORAL
Status: CANCELLED | OUTPATIENT
Start: 2022-08-24

## 2022-07-24 RX ORDER — SODIUM CHLORIDE 0.9 % (FLUSH) 0.9 %
10 SYRINGE (ML) INJECTION
Status: CANCELLED | OUTPATIENT
Start: 2022-08-24

## 2023-04-21 ENCOUNTER — PATIENT MESSAGE (OUTPATIENT)
Dept: ADMINISTRATIVE | Facility: HOSPITAL | Age: 73
End: 2023-04-21
Payer: COMMERCIAL

## 2023-06-21 DIAGNOSIS — Z12.31 OTHER SCREENING MAMMOGRAM: ICD-10-CM

## 2023-06-26 ENCOUNTER — PATIENT MESSAGE (OUTPATIENT)
Dept: ADMINISTRATIVE | Facility: HOSPITAL | Age: 73
End: 2023-06-26
Payer: COMMERCIAL

## 2023-07-28 ENCOUNTER — HOSPITAL ENCOUNTER (OUTPATIENT)
Dept: RADIOLOGY | Facility: HOSPITAL | Age: 73
Discharge: HOME | End: 2023-07-28
Attending: INTERNAL MEDICINE
Payer: COMMERCIAL

## 2023-07-28 VITALS — WEIGHT: 108 LBS | BODY MASS INDEX: 20.41 KG/M2

## 2023-07-28 DIAGNOSIS — Z12.31 OTHER SCREENING MAMMOGRAM: ICD-10-CM

## 2023-07-28 PROCEDURE — 77067 MAMMO DIGITAL SCREENING BILAT WITH TOMO: ICD-10-PCS | Mod: 26,,, | Performed by: RADIOLOGY

## 2023-07-28 PROCEDURE — 77063 BREAST TOMOSYNTHESIS BI: CPT | Mod: 26,,, | Performed by: RADIOLOGY

## 2023-07-28 PROCEDURE — 77067 SCR MAMMO BI INCL CAD: CPT | Mod: TC

## 2023-07-28 PROCEDURE — 77067 SCR MAMMO BI INCL CAD: CPT | Mod: 26,,, | Performed by: RADIOLOGY

## 2023-07-28 PROCEDURE — 77063 MAMMO DIGITAL SCREENING BILAT WITH TOMO: ICD-10-PCS | Mod: 26,,, | Performed by: RADIOLOGY

## 2023-09-02 ENCOUNTER — OFFICE VISIT (OUTPATIENT)
Dept: INTERNAL MEDICINE | Facility: CLINIC | Age: 73
End: 2023-09-02
Payer: COMMERCIAL

## 2023-09-02 ENCOUNTER — LAB VISIT (OUTPATIENT)
Dept: LAB | Facility: HOSPITAL | Age: 73
End: 2023-09-02
Attending: INTERNAL MEDICINE
Payer: COMMERCIAL

## 2023-09-02 VITALS
HEIGHT: 61 IN | OXYGEN SATURATION: 96 % | SYSTOLIC BLOOD PRESSURE: 110 MMHG | HEART RATE: 55 BPM | BODY MASS INDEX: 20.14 KG/M2 | DIASTOLIC BLOOD PRESSURE: 62 MMHG | WEIGHT: 106.69 LBS

## 2023-09-02 DIAGNOSIS — M54.16 LUMBAR RADICULOPATHY: ICD-10-CM

## 2023-09-02 DIAGNOSIS — J47.9 ADULT BRONCHIECTASIS: ICD-10-CM

## 2023-09-02 DIAGNOSIS — R00.2 HEART PALPITATIONS: ICD-10-CM

## 2023-09-02 DIAGNOSIS — Z00.00 ANNUAL PHYSICAL EXAM: Primary | ICD-10-CM

## 2023-09-02 DIAGNOSIS — M21.372 LEFT FOOT DROP: ICD-10-CM

## 2023-09-02 DIAGNOSIS — R00.0 TACHYCARDIA: ICD-10-CM

## 2023-09-02 DIAGNOSIS — Z12.11 COLON CANCER SCREENING: ICD-10-CM

## 2023-09-02 DIAGNOSIS — Z00.00 ANNUAL PHYSICAL EXAM: ICD-10-CM

## 2023-09-02 DIAGNOSIS — M81.0 OSTEOPOROSIS, POST-MENOPAUSAL: ICD-10-CM

## 2023-09-02 DIAGNOSIS — M54.9 DORSALGIA, UNSPECIFIED: ICD-10-CM

## 2023-09-02 LAB
25(OH)D3+25(OH)D2 SERPL-MCNC: 95 NG/ML (ref 30–96)
ALBUMIN SERPL BCP-MCNC: 4 G/DL (ref 3.5–5.2)
ALP SERPL-CCNC: 58 U/L (ref 55–135)
ALT SERPL W/O P-5'-P-CCNC: 12 U/L (ref 10–44)
ANION GAP SERPL CALC-SCNC: 9 MMOL/L (ref 8–16)
AST SERPL-CCNC: 22 U/L (ref 10–40)
BILIRUB SERPL-MCNC: 0.4 MG/DL (ref 0.1–1)
BUN SERPL-MCNC: 15 MG/DL (ref 8–23)
CALCIUM SERPL-MCNC: 9.6 MG/DL (ref 8.7–10.5)
CHLORIDE SERPL-SCNC: 104 MMOL/L (ref 95–110)
CHOLEST SERPL-MCNC: 193 MG/DL (ref 120–199)
CHOLEST/HDLC SERPL: 3.4 {RATIO} (ref 2–5)
CO2 SERPL-SCNC: 26 MMOL/L (ref 23–29)
CREAT SERPL-MCNC: 0.7 MG/DL (ref 0.5–1.4)
ERYTHROCYTE [DISTWIDTH] IN BLOOD BY AUTOMATED COUNT: 13.5 % (ref 11.5–14.5)
EST. GFR  (NO RACE VARIABLE): >60 ML/MIN/1.73 M^2
GLUCOSE SERPL-MCNC: 87 MG/DL (ref 70–110)
HCT VFR BLD AUTO: 41.9 % (ref 37–48.5)
HDLC SERPL-MCNC: 57 MG/DL (ref 40–75)
HDLC SERPL: 29.5 % (ref 20–50)
HGB BLD-MCNC: 13.4 G/DL (ref 12–16)
LDLC SERPL CALC-MCNC: 124.6 MG/DL (ref 63–159)
MCH RBC QN AUTO: 31.6 PG (ref 27–31)
MCHC RBC AUTO-ENTMCNC: 32 G/DL (ref 32–36)
MCV RBC AUTO: 99 FL (ref 82–98)
NONHDLC SERPL-MCNC: 136 MG/DL
PLATELET # BLD AUTO: 151 K/UL (ref 150–450)
PMV BLD AUTO: 11.3 FL (ref 9.2–12.9)
POTASSIUM SERPL-SCNC: 4.4 MMOL/L (ref 3.5–5.1)
PROT SERPL-MCNC: 8.1 G/DL (ref 6–8.4)
RBC # BLD AUTO: 4.24 M/UL (ref 4–5.4)
SODIUM SERPL-SCNC: 139 MMOL/L (ref 136–145)
TRIGL SERPL-MCNC: 57 MG/DL (ref 30–150)
TSH SERPL DL<=0.005 MIU/L-ACNC: 0.75 UIU/ML (ref 0.4–4)
WBC # BLD AUTO: 5.76 K/UL (ref 3.9–12.7)

## 2023-09-02 PROCEDURE — 1126F PR PAIN SEVERITY QUANTIFIED, NO PAIN PRESENT: ICD-10-PCS | Mod: CPTII,S$GLB,, | Performed by: INTERNAL MEDICINE

## 2023-09-02 PROCEDURE — 3008F BODY MASS INDEX DOCD: CPT | Mod: CPTII,S$GLB,, | Performed by: INTERNAL MEDICINE

## 2023-09-02 PROCEDURE — 84443 ASSAY THYROID STIM HORMONE: CPT | Performed by: INTERNAL MEDICINE

## 2023-09-02 PROCEDURE — 36415 COLL VENOUS BLD VENIPUNCTURE: CPT | Performed by: INTERNAL MEDICINE

## 2023-09-02 PROCEDURE — 99397 PR PREVENTIVE VISIT,EST,65 & OVER: ICD-10-PCS | Mod: S$GLB,,, | Performed by: INTERNAL MEDICINE

## 2023-09-02 PROCEDURE — 1101F PR PT FALLS ASSESS DOC 0-1 FALLS W/OUT INJ PAST YR: ICD-10-PCS | Mod: CPTII,S$GLB,, | Performed by: INTERNAL MEDICINE

## 2023-09-02 PROCEDURE — 99999 PR PBB SHADOW E&M-EST. PATIENT-LVL V: CPT | Mod: PBBFAC,,, | Performed by: INTERNAL MEDICINE

## 2023-09-02 PROCEDURE — 1160F PR REVIEW ALL MEDS BY PRESCRIBER/CLIN PHARMACIST DOCUMENTED: ICD-10-PCS | Mod: CPTII,S$GLB,, | Performed by: INTERNAL MEDICINE

## 2023-09-02 PROCEDURE — 3074F SYST BP LT 130 MM HG: CPT | Mod: CPTII,S$GLB,, | Performed by: INTERNAL MEDICINE

## 2023-09-02 PROCEDURE — 85027 COMPLETE CBC AUTOMATED: CPT | Performed by: INTERNAL MEDICINE

## 2023-09-02 PROCEDURE — 1159F PR MEDICATION LIST DOCUMENTED IN MEDICAL RECORD: ICD-10-PCS | Mod: CPTII,S$GLB,, | Performed by: INTERNAL MEDICINE

## 2023-09-02 PROCEDURE — 80053 COMPREHEN METABOLIC PANEL: CPT | Performed by: INTERNAL MEDICINE

## 2023-09-02 PROCEDURE — 3008F PR BODY MASS INDEX (BMI) DOCUMENTED: ICD-10-PCS | Mod: CPTII,S$GLB,, | Performed by: INTERNAL MEDICINE

## 2023-09-02 PROCEDURE — 3288F PR FALLS RISK ASSESSMENT DOCUMENTED: ICD-10-PCS | Mod: CPTII,S$GLB,, | Performed by: INTERNAL MEDICINE

## 2023-09-02 PROCEDURE — 3078F DIAST BP <80 MM HG: CPT | Mod: CPTII,S$GLB,, | Performed by: INTERNAL MEDICINE

## 2023-09-02 PROCEDURE — 3078F PR MOST RECENT DIASTOLIC BLOOD PRESSURE < 80 MM HG: ICD-10-PCS | Mod: CPTII,S$GLB,, | Performed by: INTERNAL MEDICINE

## 2023-09-02 PROCEDURE — 99999 PR PBB SHADOW E&M-EST. PATIENT-LVL V: ICD-10-PCS | Mod: PBBFAC,,, | Performed by: INTERNAL MEDICINE

## 2023-09-02 PROCEDURE — 1126F AMNT PAIN NOTED NONE PRSNT: CPT | Mod: CPTII,S$GLB,, | Performed by: INTERNAL MEDICINE

## 2023-09-02 PROCEDURE — 99397 PER PM REEVAL EST PAT 65+ YR: CPT | Mod: S$GLB,,, | Performed by: INTERNAL MEDICINE

## 2023-09-02 PROCEDURE — 3074F PR MOST RECENT SYSTOLIC BLOOD PRESSURE < 130 MM HG: ICD-10-PCS | Mod: CPTII,S$GLB,, | Performed by: INTERNAL MEDICINE

## 2023-09-02 PROCEDURE — 82306 VITAMIN D 25 HYDROXY: CPT | Performed by: INTERNAL MEDICINE

## 2023-09-02 PROCEDURE — 1159F MED LIST DOCD IN RCRD: CPT | Mod: CPTII,S$GLB,, | Performed by: INTERNAL MEDICINE

## 2023-09-02 PROCEDURE — 3288F FALL RISK ASSESSMENT DOCD: CPT | Mod: CPTII,S$GLB,, | Performed by: INTERNAL MEDICINE

## 2023-09-02 PROCEDURE — 80061 LIPID PANEL: CPT | Performed by: INTERNAL MEDICINE

## 2023-09-02 PROCEDURE — 1101F PT FALLS ASSESS-DOCD LE1/YR: CPT | Mod: CPTII,S$GLB,, | Performed by: INTERNAL MEDICINE

## 2023-09-02 PROCEDURE — 1160F RVW MEDS BY RX/DR IN RCRD: CPT | Mod: CPTII,S$GLB,, | Performed by: INTERNAL MEDICINE

## 2023-09-03 RX ORDER — HEPARIN 100 UNIT/ML
500 SYRINGE INTRAVENOUS
OUTPATIENT
Start: 2023-09-04

## 2023-09-03 RX ORDER — ZOLEDRONIC ACID 5 MG/100ML
5 INJECTION, SOLUTION INTRAVENOUS
OUTPATIENT
Start: 2023-09-04

## 2023-09-03 RX ORDER — SODIUM CHLORIDE 0.9 % (FLUSH) 0.9 %
10 SYRINGE (ML) INJECTION
OUTPATIENT
Start: 2023-09-04

## 2023-10-03 DIAGNOSIS — I48.3 TYPICAL ATRIAL FLUTTER: Primary | ICD-10-CM

## 2023-10-06 ENCOUNTER — OFFICE VISIT (OUTPATIENT)
Dept: ELECTROPHYSIOLOGY | Facility: CLINIC | Age: 73
End: 2023-10-06
Payer: COMMERCIAL

## 2023-10-06 ENCOUNTER — HOSPITAL ENCOUNTER (OUTPATIENT)
Dept: CARDIOLOGY | Facility: CLINIC | Age: 73
Discharge: HOME | End: 2023-10-06
Payer: COMMERCIAL

## 2023-10-06 ENCOUNTER — HOSPITAL ENCOUNTER (OUTPATIENT)
Dept: CARDIOLOGY | Facility: OTHER | Age: 73
Discharge: HOME | End: 2023-10-06
Attending: INTERNAL MEDICINE
Payer: COMMERCIAL

## 2023-10-06 VITALS
WEIGHT: 107 LBS | SYSTOLIC BLOOD PRESSURE: 103 MMHG | BODY MASS INDEX: 20.2 KG/M2 | HEIGHT: 61 IN | HEART RATE: 55 BPM | DIASTOLIC BLOOD PRESSURE: 53 MMHG

## 2023-10-06 VITALS
BODY MASS INDEX: 20.22 KG/M2 | HEART RATE: 55 BPM | HEIGHT: 61 IN | DIASTOLIC BLOOD PRESSURE: 53 MMHG | WEIGHT: 107.13 LBS | SYSTOLIC BLOOD PRESSURE: 103 MMHG

## 2023-10-06 DIAGNOSIS — I48.0 PAF (PAROXYSMAL ATRIAL FIBRILLATION): ICD-10-CM

## 2023-10-06 DIAGNOSIS — R00.2 HEART PALPITATIONS: ICD-10-CM

## 2023-10-06 DIAGNOSIS — R00.0 TACHYCARDIA: ICD-10-CM

## 2023-10-06 DIAGNOSIS — I48.3 TYPICAL ATRIAL FLUTTER: ICD-10-CM

## 2023-10-06 LAB
ASCENDING AORTA: 3.1 CM
AV INDEX (PROSTH): 1
AV MEAN GRADIENT: 3 MMHG
AV PEAK GRADIENT: 5 MMHG
AV REGURGITATION PRESSURE HALF TIME: 742 MS
AV VALVE AREA BY VELOCITY RATIO: 2.81 CM²
AV VALVE AREA: 3.15 CM²
AV VELOCITY RATIO: 0.89
BSA FOR ECHO PROCEDURE: 1.45 M2
CV ECHO LV RWT: 0.26 CM
DOP CALC AO PEAK VEL: 1.14 M/S
DOP CALC AO VTI: 24.2 CM
DOP CALC LVOT AREA: 3.1 CM2
DOP CALC LVOT DIAMETER: 2 CM
DOP CALC LVOT PEAK VEL: 1.02 M/S
DOP CALC LVOT STROKE VOLUME: 76.3 CM3
DOP CALC RVOT PEAK VEL: 0.78 M/S
DOP CALC RVOT VTI: 17.6 CM
DOP CALCLVOT PEAK VEL VTI: 24.3 CM
E WAVE DECELERATION TIME: 169.33 MSEC
E/A RATIO: 1.14
E/E' RATIO: 8.47 M/S
ECHO LV POSTERIOR WALL: 0.58 CM (ref 0.6–1.1)
FRACTIONAL SHORTENING: 40 % (ref 28–44)
INTERVENTRICULAR SEPTUM: 0.64 CM (ref 0.6–1.1)
IVC DIAMETER: 1 CM
IVRT: 110.37 MSEC
LA MAJOR: 4.49 CM
LA MINOR: 4.73 CM
LA WIDTH: 3.3 CM
LEFT ATRIUM SIZE: 3.11 CM
LEFT ATRIUM VOLUME INDEX MOD: 26.3 ML/M2
LEFT ATRIUM VOLUME INDEX: 27.7 ML/M2
LEFT ATRIUM VOLUME MOD: 38.11 CM3
LEFT ATRIUM VOLUME: 40.19 CM3
LEFT INTERNAL DIMENSION IN SYSTOLE: 2.65 CM (ref 2.1–4)
LEFT VENTRICLE DIASTOLIC VOLUME INDEX: 60.39 ML/M2
LEFT VENTRICLE DIASTOLIC VOLUME: 87.56 ML
LEFT VENTRICLE MASS INDEX: 53 G/M2
LEFT VENTRICLE SYSTOLIC VOLUME INDEX: 17.8 ML/M2
LEFT VENTRICLE SYSTOLIC VOLUME: 25.87 ML
LEFT VENTRICULAR INTERNAL DIMENSION IN DIASTOLE: 4.4 CM (ref 3.5–6)
LEFT VENTRICULAR MASS: 77.41 G
LV LATERAL E/E' RATIO: 6 M/S
LV SEPTAL E/E' RATIO: 14.4 M/S
LVOT MG: 2.09 MMHG
LVOT MV: 0.67 CM/S
MV PEAK A VEL: 0.63 M/S
MV PEAK E VEL: 0.72 M/S
MV STENOSIS PRESSURE HALF TIME: 49.1 MS
MV VALVE AREA P 1/2 METHOD: 4.48 CM2
PISA AR MAX VEL: 3.41 M/S
PISA MRMAX VEL: 4.95 M/S
PISA TR MAX VEL: 2.15 M/S
PULM VEIN S/D RATIO: 1.19
PV MEAN GRADIENT: 1 MMHG
PV PEAK D VEL: 0.43 M/S
PV PEAK GRADIENT: 4 MMHG
PV PEAK S VEL: 0.51 M/S
PV PEAK VELOCITY: 1.03 M/S
RA MAJOR: 3.7 CM
RA PRESSURE ESTIMATED: 3 MMHG
RA WIDTH: 4 CM
RIGHT VENTRICULAR END-DIASTOLIC DIMENSION: 3.5 CM
RV TB RVSP: 5 MMHG
RV TISSUE DOPPLER FREE WALL SYSTOLIC VELOCITY 1 (APICAL 4 CHAMBER VIEW): 12.24 CM/S
SINUS: 2.9 CM
STJ: 2.74 CM
TDI LATERAL: 0.12 M/S
TDI SEPTAL: 0.05 M/S
TDI: 0.09 M/S
TR MAX PG: 18 MMHG
TRICUSPID ANNULAR PLANE SYSTOLIC EXCURSION: 2 CM
TV REST PULMONARY ARTERY PRESSURE: 21 MMHG
Z-SCORE OF LEFT VENTRICULAR DIMENSION IN END DIASTOLE: 0.03
Z-SCORE OF LEFT VENTRICULAR DIMENSION IN END SYSTOLE: -0.21

## 2023-10-06 PROCEDURE — 3074F PR MOST RECENT SYSTOLIC BLOOD PRESSURE < 130 MM HG: ICD-10-PCS | Mod: CPTII,S$GLB,, | Performed by: INTERNAL MEDICINE

## 2023-10-06 PROCEDURE — 3008F PR BODY MASS INDEX (BMI) DOCUMENTED: ICD-10-PCS | Mod: CPTII,S$GLB,, | Performed by: INTERNAL MEDICINE

## 2023-10-06 PROCEDURE — 1101F PR PT FALLS ASSESS DOC 0-1 FALLS W/OUT INJ PAST YR: ICD-10-PCS | Mod: CPTII,S$GLB,, | Performed by: INTERNAL MEDICINE

## 2023-10-06 PROCEDURE — 3074F SYST BP LT 130 MM HG: CPT | Mod: CPTII,S$GLB,, | Performed by: INTERNAL MEDICINE

## 2023-10-06 PROCEDURE — 99999 PR PBB SHADOW E&M-EST. PATIENT-LVL III: ICD-10-PCS | Mod: PBBFAC,,, | Performed by: INTERNAL MEDICINE

## 2023-10-06 PROCEDURE — 3078F DIAST BP <80 MM HG: CPT | Mod: CPTII,S$GLB,, | Performed by: INTERNAL MEDICINE

## 2023-10-06 PROCEDURE — 1126F PR PAIN SEVERITY QUANTIFIED, NO PAIN PRESENT: ICD-10-PCS | Mod: CPTII,S$GLB,, | Performed by: INTERNAL MEDICINE

## 2023-10-06 PROCEDURE — 93005 RHYTHM STRIP: ICD-10-PCS | Mod: S$GLB,,, | Performed by: INTERNAL MEDICINE

## 2023-10-06 PROCEDURE — 3008F BODY MASS INDEX DOCD: CPT | Mod: CPTII,S$GLB,, | Performed by: INTERNAL MEDICINE

## 2023-10-06 PROCEDURE — 1160F RVW MEDS BY RX/DR IN RCRD: CPT | Mod: CPTII,S$GLB,, | Performed by: INTERNAL MEDICINE

## 2023-10-06 PROCEDURE — 3288F FALL RISK ASSESSMENT DOCD: CPT | Mod: CPTII,S$GLB,, | Performed by: INTERNAL MEDICINE

## 2023-10-06 PROCEDURE — 99205 PR OFFICE/OUTPT VISIT, NEW, LEVL V, 60-74 MIN: ICD-10-PCS | Mod: S$GLB,,, | Performed by: INTERNAL MEDICINE

## 2023-10-06 PROCEDURE — 93010 RHYTHM STRIP: ICD-10-PCS | Mod: S$GLB,,, | Performed by: INTERNAL MEDICINE

## 2023-10-06 PROCEDURE — 99999 PR PBB SHADOW E&M-EST. PATIENT-LVL III: CPT | Mod: PBBFAC,,, | Performed by: INTERNAL MEDICINE

## 2023-10-06 PROCEDURE — 99205 OFFICE O/P NEW HI 60 MIN: CPT | Mod: S$GLB,,, | Performed by: INTERNAL MEDICINE

## 2023-10-06 PROCEDURE — 1126F AMNT PAIN NOTED NONE PRSNT: CPT | Mod: CPTII,S$GLB,, | Performed by: INTERNAL MEDICINE

## 2023-10-06 PROCEDURE — 93005 ELECTROCARDIOGRAM TRACING: CPT | Mod: S$GLB,,, | Performed by: INTERNAL MEDICINE

## 2023-10-06 PROCEDURE — 1101F PT FALLS ASSESS-DOCD LE1/YR: CPT | Mod: CPTII,S$GLB,, | Performed by: INTERNAL MEDICINE

## 2023-10-06 PROCEDURE — 1160F PR REVIEW ALL MEDS BY PRESCRIBER/CLIN PHARMACIST DOCUMENTED: ICD-10-PCS | Mod: CPTII,S$GLB,, | Performed by: INTERNAL MEDICINE

## 2023-10-06 PROCEDURE — 93306 ECHO (CUPID ONLY): ICD-10-PCS | Mod: 26,,, | Performed by: INTERNAL MEDICINE

## 2023-10-06 PROCEDURE — 1159F PR MEDICATION LIST DOCUMENTED IN MEDICAL RECORD: ICD-10-PCS | Mod: CPTII,S$GLB,, | Performed by: INTERNAL MEDICINE

## 2023-10-06 PROCEDURE — 3078F PR MOST RECENT DIASTOLIC BLOOD PRESSURE < 80 MM HG: ICD-10-PCS | Mod: CPTII,S$GLB,, | Performed by: INTERNAL MEDICINE

## 2023-10-06 PROCEDURE — 93306 TTE W/DOPPLER COMPLETE: CPT

## 2023-10-06 PROCEDURE — 93306 TTE W/DOPPLER COMPLETE: CPT | Mod: 26,,, | Performed by: INTERNAL MEDICINE

## 2023-10-06 PROCEDURE — 93010 ELECTROCARDIOGRAM REPORT: CPT | Mod: S$GLB,,, | Performed by: INTERNAL MEDICINE

## 2023-10-06 PROCEDURE — 1159F MED LIST DOCD IN RCRD: CPT | Mod: CPTII,S$GLB,, | Performed by: INTERNAL MEDICINE

## 2023-10-06 PROCEDURE — 3288F PR FALLS RISK ASSESSMENT DOCUMENTED: ICD-10-PCS | Mod: CPTII,S$GLB,, | Performed by: INTERNAL MEDICINE

## 2023-10-06 RX ORDER — FLECAINIDE ACETATE 150 MG/1
TABLET ORAL
Qty: 30 TABLET | Refills: 3 | Status: SHIPPED | OUTPATIENT
Start: 2023-10-06

## 2023-10-06 NOTE — PROGRESS NOTES
Subjective:   Patient ID:  Luly Lora is a 73 y.o. 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   Now occurs about 1 q month, lasting many hours.  Her watch monitor/ecg shows AF. 2 recordings made, showing AF at 60s-70s bpm.  When she has AF, she feels unwell.  Good exertion tolerance.  TSH normal    My interpretation of today's ECG is NSR    Review of Systems   Constitutional: Negative. Negative for malaise/fatigue.   HENT: Negative.  Negative for ear pain and tinnitus.    Eyes:  Negative for blurred vision.   Cardiovascular:  Positive for palpitations. Negative for chest pain, dyspnea on exertion, near-syncope and syncope.   Respiratory: Negative.  Negative for shortness of breath.    Endocrine: Negative.  Negative for polyuria.   Hematologic/Lymphatic: Does not bruise/bleed easily.   Skin: Negative.  Negative for rash.   Musculoskeletal: Negative.  Negative for joint pain and muscle weakness.   Gastrointestinal: Negative.  Negative for abdominal pain and change in bowel habit.   Genitourinary:  Negative for frequency.   Neurological: Negative.  Negative for dizziness and weakness.   Psychiatric/Behavioral: Negative.  Negative for depression. The patient is not nervous/anxious.    Allergic/Immunologic: Negative for environmental allergies.       Objective:   Physical Exam  Vitals and nursing note reviewed.   Constitutional:       Appearance: Normal appearance. She is well-developed.   HENT:      Head: Normocephalic and atraumatic.   Eyes:      Conjunctiva/sclera: Conjunctivae normal.   Neck:      Thyroid: No thyroid mass or thyromegaly.      Trachea: No tracheal deviation.   Cardiovascular:      Rate and Rhythm: Normal rate and regular rhythm.   Pulmonary:      Effort: Pulmonary effort is normal. No respiratory distress.      Breath sounds: No wheezing.   Abdominal:      General:  There is no distension.   Musculoskeletal:         General: Normal range of motion.      Cervical back: Normal range of motion. No edema.   Skin:     General: Skin is warm and dry.      Findings: No rash.   Neurological:      Mental Status: She is alert and oriented to person, place, and time.      Coordination: Coordination normal.   Psychiatric:         Speech: Speech normal.         Behavior: Behavior normal. Behavior is cooperative.         Thought Content: Thought content normal.         Assessment:      1. Heart palpitations    2. Tachycardia    3. PAF (paroxysmal atrial fibrillation)        Plan:     Discussed AF and its basic pathophysiology, including its health implications and treatment options (rate vs. rhythm control, meds vs. procedural/device treatment) as appropriate for the patient.  Discussed standing-dose meds vs. PIP. She prefers the latter.    Discussed pill-in-the-pocket approach to AF. Gave pt instructions to present to ER if sustained AF is felt, for the first trial of that approach; Give Flecainide 300 mg PO x 1 in ED. Provided reference to [facility] article (Davon et al. N Engl J Med 2004;351:2384-91).   The emergency department will monitor the patient on telemetry. According to the literature, expected efficacy is conversion of up to 68% of patients by 4 hours after the dose, and up to 91% after 8 hours. I recommend ED-based observation until return to sinus rhythm or for 8 hours after the dose, whichever comes first. If this tactic is tolerated and effective, thereafter the patient would be able to self-direct pill-in-the-pocket treatment.      Echo today  f/u 6 mos or earlier prn.

## 2023-10-27 ENCOUNTER — TELEPHONE (OUTPATIENT)
Dept: ENDOSCOPY | Facility: HOSPITAL | Age: 73
End: 2023-10-27

## 2023-10-27 ENCOUNTER — CLINICAL SUPPORT (OUTPATIENT)
Dept: ENDOSCOPY | Facility: HOSPITAL | Age: 73
End: 2023-10-27
Attending: INTERNAL MEDICINE
Payer: COMMERCIAL

## 2023-10-27 VITALS — BODY MASS INDEX: 20.2 KG/M2 | WEIGHT: 107 LBS | HEIGHT: 61 IN

## 2023-10-27 DIAGNOSIS — Z12.11 COLON CANCER SCREENING: ICD-10-CM

## 2023-10-27 RX ORDER — POLYETHYLENE GLYCOL 3350, SODIUM SULFATE ANHYDROUS, SODIUM BICARBONATE, SODIUM CHLORIDE, POTASSIUM CHLORIDE 236; 22.74; 6.74; 5.86; 2.97 G/4L; G/4L; G/4L; G/4L; G/4L
4 POWDER, FOR SOLUTION ORAL ONCE
Qty: 4000 ML | Refills: 0 | Status: SHIPPED | OUTPATIENT
Start: 2023-10-27 | End: 2023-10-27

## 2023-10-27 NOTE — TELEPHONE ENCOUNTER
Spoke to patient to schedule procedure(s) Colonoscopy       Physician to perform procedure(s) Dr. ALKA Arguelles  Date of Procedure (s) 2/16/24  Arrival Time 6:00 AM  Time of Procedure(s) 7:00 AM   Location of Procedure(s) [facility] 4th Floor  Type of Rx Prep sent to patient: PEG  Instructions provided to patient via [facility]    Patient was informed on the following information and verbalized understanding. Screening questionnaire reviewed with patient and complete. If procedure requires anesthesia, a responsible adult needs to be present to accompany the patient home, patient cannot drive after receiving anesthesia. Appointment details are tentative, especially check-in time. Patient will receive a prep-op call 4 days prior to confirm check-in time for procedure. If applicable the patient should contact their pharmacy to verify Rx for procedure prep is ready for pick-up. Patient was advised to call the scheduling department at [phone] if pharmacy states no Rx is available. Patient was advised to call the endoscopy scheduling department if any questions or concerns arise.      [facility] Scheduling Department

## 2023-10-30 ENCOUNTER — TELEPHONE (OUTPATIENT)
Dept: ENDOSCOPY | Facility: HOSPITAL | Age: 73
End: 2023-10-30
Payer: COMMERCIAL

## 2023-10-30 NOTE — TELEPHONE ENCOUNTER
----- Message from Kath Mcginnis RN sent at 10/27/2023  1:16 PM CDT -----  Regardin/16  BT  24  Received: Today  Bethany Hodges RN  [facility] Endoscopy Scheduling Anticoag  Caller: Unspecified (Today, 12:38 PM)  The patient is currently under an internal cardiologist Dr TARA wright and requires a blood thinner Xarelto (rivaroxaban) for their upcoming scheduled Colonoscopy on 24.         Notes: Newly diagnosed A Fib.

## 2023-12-19 LAB
BASOPHILS # BLD AUTO: 0.04 K/UL (ref 0–0.2)
BASOPHILS NFR BLD: 0.5 % (ref 0–1.9)
DIFFERENTIAL METHOD: ABNORMAL
EOSINOPHIL # BLD AUTO: 0.1 K/UL (ref 0–0.5)
EOSINOPHIL NFR BLD: 1 % (ref 0–8)
ERYTHROCYTE [DISTWIDTH] IN BLOOD BY AUTOMATED COUNT: 13.5 % (ref 11.5–14.5)
HCT VFR BLD AUTO: 39.9 % (ref 37–48.5)
HGB BLD-MCNC: 13.3 G/DL (ref 12–16)
IMM GRANULOCYTES # BLD AUTO: 0.02 K/UL (ref 0–0.04)
IMM GRANULOCYTES NFR BLD AUTO: 0.3 % (ref 0–0.5)
LYMPHOCYTES # BLD AUTO: 1.5 K/UL (ref 1–4.8)
LYMPHOCYTES NFR BLD: 19.8 % (ref 18–48)
MCH RBC QN AUTO: 31.5 PG (ref 27–31)
MCHC RBC AUTO-ENTMCNC: 33.3 G/DL (ref 32–36)
MCV RBC AUTO: 95 FL (ref 82–98)
MONOCYTES # BLD AUTO: 0.6 K/UL (ref 0.3–1)
MONOCYTES NFR BLD: 7.1 % (ref 4–15)
NEUTROPHILS # BLD AUTO: 5.5 K/UL (ref 1.8–7.7)
NEUTROPHILS NFR BLD: 71.3 % (ref 38–73)
NRBC BLD-RTO: 0 /100 WBC
PLATELET # BLD AUTO: 269 K/UL (ref 150–450)
PMV BLD AUTO: 11.1 FL (ref 9.2–12.9)
RBC # BLD AUTO: 4.22 M/UL (ref 4–5.4)
WBC # BLD AUTO: 7.71 K/UL (ref 3.9–12.7)

## 2023-12-19 PROCEDURE — 94761 N-INVAS EAR/PLS OXIMETRY MLT: CPT

## 2023-12-19 PROCEDURE — 85025 COMPLETE CBC W/AUTO DIFF WBC: CPT | Performed by: EMERGENCY MEDICINE

## 2023-12-19 PROCEDURE — 93010 EKG 12-LEAD: ICD-10-PCS | Mod: ,,, | Performed by: INTERNAL MEDICINE

## 2023-12-19 PROCEDURE — 80048 BASIC METABOLIC PNL TOTAL CA: CPT

## 2023-12-19 PROCEDURE — 99284 EMERGENCY DEPT VISIT MOD MDM: CPT | Mod: 25

## 2023-12-19 PROCEDURE — 93010 ELECTROCARDIOGRAM REPORT: CPT | Mod: ,,, | Performed by: INTERNAL MEDICINE

## 2023-12-19 PROCEDURE — 93005 ELECTROCARDIOGRAM TRACING: CPT

## 2023-12-20 ENCOUNTER — HOSPITAL ENCOUNTER (EMERGENCY)
Facility: HOSPITAL | Age: 73
Discharge: HOME | End: 2023-12-20
Attending: EMERGENCY MEDICINE
Payer: COMMERCIAL

## 2023-12-20 VITALS
RESPIRATION RATE: 18 BRPM | HEART RATE: 76 BPM | BODY MASS INDEX: 20.78 KG/M2 | WEIGHT: 110 LBS | TEMPERATURE: 99 F | DIASTOLIC BLOOD PRESSURE: 80 MMHG | OXYGEN SATURATION: 96 % | SYSTOLIC BLOOD PRESSURE: 132 MMHG

## 2023-12-20 DIAGNOSIS — I48.91 A-FIB: ICD-10-CM

## 2023-12-20 LAB
BUN SERPL-MCNC: 21 MG/DL (ref 6–30)
CHLORIDE SERPL-SCNC: 103 MMOL/L (ref 95–110)
CREAT SERPL-MCNC: 0.5 MG/DL (ref 0.5–1.4)
GLUCOSE SERPL-MCNC: 94 MG/DL (ref 70–110)
HCT VFR BLD CALC: 45 %PCV (ref 36–54)
POC IONIZED CALCIUM: 1.19 MMOL/L (ref 1.06–1.42)
POC TCO2 (MEASURED): 30 MMOL/L (ref 23–29)
POTASSIUM BLD-SCNC: 5.2 MMOL/L (ref 3.5–5.1)
SAMPLE: ABNORMAL
SODIUM BLD-SCNC: 142 MMOL/L (ref 136–145)

## 2023-12-20 RX ORDER — FLECAINIDE ACETATE 100 MG/1
300 TABLET ORAL
Status: DISCONTINUED | OUTPATIENT
Start: 2023-12-20 | End: 2023-12-20

## 2023-12-20 NOTE — DISCHARGE INSTRUCTIONS

## 2023-12-20 NOTE — ED NOTES
I-STAT Chem-8+ Results:   Value Reference Range   Sodium 142 136-145 mmol/L   Potassium  5.2 3.5-5.1 mmol/L   Chloride 103  mmol/L   Ionized Calcium 1.19 1.06-1.42 mmol/L   CO2 (measured) 30 23-29 mmol/L   Glucose 94  mg/dL   BUN 21 6-30 mg/dL   Creatinine 0.5 0.5-1.4 mg/dL   Hematocrit 45 36-54%

## 2023-12-20 NOTE — ED PROVIDER NOTES
Encounter Date: 12/19/2023       History     Chief Complaint   Patient presents with    Referral     Hx of Sanjuana, reports feeling palpitations a few hrs ago and was told by MD to come to ED to be monitored after taking flecainide for first time     73-year-old female arrives to the emergency department with a chief complaint of palpitations.  Patient is followed by EP who she has seen for paroxysmal AFib.  She was recently prescribed flecainide, she has never taken the medication before.  He was given explicit instructions of a pill in pocket approach and advised to present to the emergency department prior to administration of her 1st dose for observation.  Patient reports frequent bouts of paroxysmal AFib usually once a month.  Over the past couple of weeks she has had 1-2 episodes but it resolved quickly and she did not take the medication or come to the hospital.  Tonight around 7:00 p.m. on December 19 she began experiencing palpitations with an abnormal heart rhythm.  This prompted her visit to the ED. since arriving to the ED her palpitations has resolved.  Her 12 lead EKG shows atrial fibrillation.  She has no chest pain.  She has no shortness a breath.      Review of patient's allergies indicates:  No Known Allergies  Past Medical History:   Diagnosis Date    Brain bleed 1998    Osteoporosis     Pelvic fracture      Past Surgical History:   Procedure Laterality Date    EYE SURGERY  2005    ingrown eye lashes BL    WISDOM TOOTH EXTRACTION       Family History   Problem Relation Age of Onset    Hypertension Mother     Heart disease Mother         MI age 77    Kidney failure Mother     Diabetes Mother     Parkinsonism Father     Hypertension Brother     Hypertension Brother     Diabetes Brother     No Known Problems Son     Breast cancer Neg Hx     Ovarian cancer Neg Hx     Cervical cancer Neg Hx     Endometrial cancer Neg Hx     Vaginal cancer Neg Hx     Asthma Neg Hx     Emphysema Neg Hx      Social History      Tobacco Use    Smoking status: Never    Smokeless tobacco: Never   Substance Use Topics    Alcohol use: Yes     Alcohol/week: 1.0 standard drink of alcohol     Types: 1 Glasses of wine per week     Comment: occasionally- 3-4 a month    Drug use: No     Review of Systems   Constitutional:  Negative for fever.   HENT:  Negative for sore throat.    Respiratory:  Negative for shortness of breath.    Cardiovascular:  Positive for palpitations. Negative for chest pain.   Gastrointestinal:  Negative for nausea.   Genitourinary:  Negative for dysuria.   Musculoskeletal:  Negative for back pain.   Skin:  Negative for rash.   Neurological:  Negative for weakness.   Hematological:  Does not bruise/bleed easily.       Physical Exam     Initial Vitals [12/19/23 2032]   BP Pulse Resp Temp SpO2   (!) 151/72 80 17 98.2 °F (36.8 °C) 97 %      MAP       --         Physical Exam    Constitutional: Vital signs are normal. She appears well-developed and well-nourished.   HENT:   Head: Normocephalic and atraumatic.   Right Ear: Hearing normal.   Left Ear: Hearing normal.   Eyes: Conjunctivae are normal.   Cardiovascular:  Normal rate.           Abdominal: Abdomen is soft. Bowel sounds are normal.   Musculoskeletal:         General: Normal range of motion.     Neurological: She is alert and oriented to person, place, and time.   Skin: Skin is warm and intact.   Psychiatric: She has a normal mood and affect. Her speech is normal and behavior is normal. Cognition and memory are normal.         ED Course   Procedures  Labs Reviewed   CBC W/ AUTO DIFFERENTIAL - Abnormal; Notable for the following components:       Result Value    MCH 31.5 (*)     All other components within normal limits   ISTAT PROCEDURE - Abnormal; Notable for the following components:    POC Potassium 5.2 (*)     POC TCO2 (MEASURED) 30 (*)     All other components within normal limits   COMPREHENSIVE METABOLIC PANEL   MAGNESIUM   ISTAT CHEM8          Imaging Results     None          Medications - No data to display    Medical Decision Making  73-year-old female with a history of paroxysmal AFib presents to the ER with palpitations that has now resolved.  Patient advised to come to the ER per her electrophysiologist for monitoring of her 1st dose of flecainide.     Patient began experiencing palpitations around 7:00 p.m., on my initial evaluation at 12:30 a.m. on December 20th for palpitations has resolved.  She does not have chest pain or shortness a breath.      On arrival to the ED patient was in AFib, rate controlled.   On reassessment at 12:40 a.m. patient is in normal sinus rhythm.  I will hold off on flecainide at this time as the patient has spontaneously converted and monitoring the ED.     Differential diagnosis includes but not limited to paroxysmal AFib, ACS, pneumonia, electrolyte derangement  I doubt ACS.  EKG without acute ischemia and patient without chest pain.  I doubt pneumonia as the patient does not have any infectious symptoms cough or congestion.          3:00 a.m. assessment   No acute findings on labs.  Patient monitored in the ED for couple of hours with no recurrence of AFib.  Patient has spontaneously converted to normal sinus rhythm without the use of flecainide.  She was discharged home in stable condition.    Amount and/or Complexity of Data Reviewed  Labs: ordered.    Risk  Prescription drug management.                                      Clinical Impression:  Final diagnoses:  [I48.91] A-fib          ED Disposition Condition    Discharge Stable          ED Prescriptions    None       Follow-up Information       Follow up With Specialties Details Why Contact Info    Elma Copeland MD Internal Medicine   [address] [place] [id number]  [phone]               Destin Moseley PA-C  12/20/23 0255

## 2023-12-20 NOTE — ED TRIAGE NOTES
Pt presents to ED for doctor referral. Pt reports hx of A fib, states MD notified her to come to ED to be monitored to take flecainide for first time. Pt reports palpitations earlier today, denies current palpitations.

## 2023-12-20 NOTE — FIRST PROVIDER EVALUATION
Emergency Department TeleTriage Encounter Note      CHIEF COMPLAINT    Chief Complaint   Patient presents with    Referral     Hx of A-fib, reports feeling palpitations a few hrs ago and was told by MD to come to ED to be monitored after taking flecainide for first time       VITAL SIGNS   Initial Vitals [12/19/23 2032]   BP Pulse Resp Temp SpO2   (!) 151/72 80 17 98.2 °F (36.8 °C) 97 %      MAP       --            ALLERGIES    Review of patient's allergies indicates:  No Known Allergies    PROVIDER TRIAGE NOTE  This is a teletriage evaluation of a 73 y.o. female presenting to the ED complaining of palpitations - hx of afib.  Was told if symptomatic to take flecainide and come to ER.     Initial orders will be placed and care will be transferred to an alternate provider when patient is roomed for a full evaluation. Any additional orders and the final disposition will be determined by that provider.         ORDERS  Labs Reviewed - No data to display    ED Orders (720h ago, onward)      Start Ordered     Status Ordering Provider    12/19/23 2049 12/19/23 2048  Cardiac Monitoring - Adult  Continuous        Comments: Notify Physician If:    Ordered JAMAR SOMMERS    12/19/23 2049 12/19/23 2048  Pulse Oximetry Continuous  Continuous         Ordered JAMAR SOMMERS    12/19/23 2034 12/19/23 2033  EKG 12-lead  Once         Completed by DAPHNEY ECHEVARRIA on 12/19/2023 at  8:46 PM RADHA SCHWARTZ              Virtual Visit Note: The provider triage portion of this emergency department evaluation and documentation was performed via [employer], a HIPAA-compliant telemedicine application, in concert with a tele-presenter in the room. A face to face patient evaluation with one of my colleagues will occur once the patient is placed in an emergency department room.      DISCLAIMER: This note was prepared with [employer] voice recognition transcription software. Garbled syntax, mangled pronouns, and  other bizarre constructions may be attributed to that software system.

## 2024-01-09 ENCOUNTER — TELEPHONE (OUTPATIENT)
Dept: INTERNAL MEDICINE | Facility: CLINIC | Age: 74
End: 2024-01-09
Payer: COMMERCIAL

## 2024-01-09 ENCOUNTER — E-VISIT (OUTPATIENT)
Dept: FAMILY MEDICINE | Facility: CLINIC | Age: 74
End: 2024-01-09
Payer: COMMERCIAL

## 2024-01-09 DIAGNOSIS — U07.1 COVID: Primary | ICD-10-CM

## 2024-01-09 DIAGNOSIS — R05.9 COUGH, UNSPECIFIED TYPE: ICD-10-CM

## 2024-01-09 PROCEDURE — 99422 OL DIG E/M SVC 11-20 MIN: CPT | Mod: ,,, | Performed by: STUDENT IN AN ORGANIZED HEALTH CARE EDUCATION/TRAINING PROGRAM

## 2024-01-09 RX ORDER — PROMETHAZINE HYDROCHLORIDE AND DEXTROMETHORPHAN HYDROBROMIDE 6.25; 15 MG/5ML; MG/5ML
5 SYRUP ORAL EVERY 6 HOURS PRN
Qty: 118 ML | Refills: 1 | Status: SHIPPED | OUTPATIENT
Start: 2024-01-09 | End: 2024-01-19

## 2024-01-09 NOTE — TELEPHONE ENCOUNTER
----- Message from Shanthi Escobar sent at 1/9/2024  9:44 AM CST -----  Regarding: covid  Contact: [phone]  Pt tested positive for Covid. She has a cough and fever and would like to request a prescription. Please give her a call back.

## 2024-01-09 NOTE — TELEPHONE ENCOUNTER
----- Message from Shanthi Escobar sent at 1/9/2024  9:44 AM CST -----  Regarding: covid  Contact: [phone]  Pt tested positive for Covid. She has a cough and fever and would like to request a prescription. Please give her a call back.

## 2024-01-09 NOTE — PROGRESS NOTES
Patient ID: Luly Lora is a 73 y.o. female.    Chief Complaint: URI (Entered automatically based on patient selection in Patient Portal.)          274}  The patient initiated a request through [employer] on 1/9/2024 for evaluation and management with a chief complaint of URI (Entered automatically based on patient selection in Patient Portal.)     I evaluated the questionnaire submission on 01/09/2024 .    Ohs Peq Evisit Upper Respitatory/Cough Questionnaire    1/9/2024 11:48 AM CST - Filed by Patient   Do you agree to participate in an E-Visit? Yes   If you have any of the following symptoms, please present to your local ER or call 911:  I acknowledge   What is the main issue that you would like for your doctor to address today? I would like to request a prescription of Paxlovid to treat me Covid 19 conditions.   Are you able to take your vital signs? Yes   Systolic Blood Pressure: 113   Diastolic Blood Pressure: 70   Weight: 108   Height: 61   Pulse: 83   Temperature: 101.5   Respiration rate: 16   Pulse Oxygen: 96   What symptoms do you currently have?  Chills;  Cough;  Sore throat   Describe your cough: Productive (containing mucus);  Bothersome (interferes with daily activities)   Describe the mucus: White   Have you had any of the following? None of the above   Have you ever smoked? I have never smoked   Have you had a fever? Yes   What has been the range of your fever? 100.4 or greater   When did your symptoms first appear? 1/8/2024   In the last two weeks, have you been in close contact with someone who has COVID-19 or the Flu? No   In the last two weeks, have you worked or volunteered in a healthcare facility or as a ? Healthcare facilities include a hospital, medical or dental clinic, long-term care facility, or nursing home No   Do you live in a long-term care facility, nursing home, group home, or homeless shelter? No   List what you have done or taken to help your symptoms. Drinking water and  applying cold pad to lower temperatures.   How severe are your symptoms? Moderate   Have your symptoms improved since they first appeared? No change   Have you taken an at home Covid test? Yes   What were the results? Positive   Have you taken a Flu test? No   Have you been fully vaccinated for COVID? (2 Pfizer, 2 Moderna or 1 Isael & Isael vaccine injections) Yes   Have you received a booster? Yes   Have you recieved a Flu shot? Yes   When did you recieve your Flu shot? 10/2/2023   Do you have transportation to get tested for COVID if it is indicated and ordered for you at an [facility] location? Yes   Provide any information you feel is important to your history not asked above 1st time Covid   Please attach any relevant images or files           Active Problem List with Overview Notes    Diagnosis Date Noted    PAF (paroxysmal atrial fibrillation) 10/06/2023    Adult bronchiectasis 08/15/2018    Vaginal atrophy 02/08/2018    History of BCG vaccination 01/31/2018    Osteoporosis, post-menopausal 01/10/2018    Uterovaginal prolapse 01/10/2018    Left foot drop 11/19/2014      Recent Labs Obtained:  Lab Results   Component Value Date    WBC 7.71 12/19/2023    HGB 13.3 12/19/2023    HCT 45 12/20/2023    MCV 95 12/19/2023     12/19/2023     09/02/2023    K 4.4 09/02/2023    GLU 87 09/02/2023    CREATININE 0.7 09/02/2023    EGFRNORACEVR >60.0 09/02/2023      Review of patient's allergies indicates:  No Known Allergies    Encounter Diagnoses   Name Primary?    COVID Yes    Cough, unspecified type         No orders of the defined types were placed in this encounter.     Medications Ordered This Encounter   Medications    molnupiravir 200 mg capsule (EUA)     Sig: Take 4 capsules (800 mg total) by mouth every 12 (twelve) hours. for 5 days     Dispense:  40 capsule     Refill:  0     Order Specific Question:   I have informed patients with partners of childbearing potential to use a reliable method of  contraception during therapy and for 3 months after the last dose of molnupiravir. I have informed them molnupiravir may cause fetal harm.     Answer:   Acknowledge    promethazine-dextromethorphan (PROMETHAZINE-DM) 6.25-15 mg/5 mL Syrp     Sig: Take 5 mLs by mouth every 6 (six) hours as needed (cough).     Dispense:  118 mL     Refill:  1        E-Visit Time Tracking:    Day 1 Time (in minutes): 12     Total Time (in minutes): 12    This is the extent of this pleasant patient's concerns at this present time. She did not feel chest pain upon exertion. No unilateral leg swelling, calf tenderness, or calf pain.    274}

## 2024-01-09 NOTE — TELEPHONE ENCOUNTER
----- Message from Shanthi Escobar sent at 1/9/2024  9:44 AM CST -----  Regarding: covid  Contact: [phone]  Pt tested positive for Covid. She has a cough and fever and would like to request a prescription. Please give her a call back.

## 2024-01-14 DIAGNOSIS — M81.0 OSTEOPOROSIS, POST-MENOPAUSAL: ICD-10-CM

## 2024-01-15 NOTE — TELEPHONE ENCOUNTER
Care Due:                  Date            Visit Type   Department     Provider  --------------------------------------------------------------------------------                                MYCHART                              ANNUAL                              CHECKUP/PHY  [facility] INTERNAL  Last Visit: 09-      S            TIKI HERNANDEZ  Next Visit: None Scheduled  None         None Found                                                            Last  Test          Frequency    Reason                     Performed    Due Date  --------------------------------------------------------------------------------    Mg Level....  12 months..  alendronate..............  Not Found    Overdue    Phosphate...  12 months..  alendronate..............  Not Found    Overdue    [employer] Embedded Care Due Messages. Reference number: [id number].   1/14/2024 10:34:31 PM CST

## 2024-01-16 ENCOUNTER — TELEPHONE (OUTPATIENT)
Dept: ENDOSCOPY | Facility: HOSPITAL | Age: 74
End: 2024-01-16
Payer: COMMERCIAL

## 2024-01-16 RX ORDER — ALENDRONATE SODIUM 70 MG/1
TABLET ORAL
Qty: 12 TABLET | Refills: 3 | Status: SHIPPED | OUTPATIENT
Start: 2024-01-16

## 2024-01-16 NOTE — TELEPHONE ENCOUNTER
Refill Routing Note   Medication(s) are not appropriate for processing by [facility] for the following reason(s):        Outside of protocol    [facility] action(s):  Route     Requires labs : Yes             Appointments  past 12m or future 3m with PCP    Date Provider   Last Visit   9/2/2023 Elma Copeland MD   Next Visit   Visit date not found Elma Copeland MD   ED visits in past 90 days: 1        Note composed:11:43 AM 01/16/2024

## 2024-01-16 NOTE — TELEPHONE ENCOUNTER
----- Message from Kath Mcginnis RN sent at 10/27/2023  1:16 PM CDT -----  Regardin/16  BT  24  Received: Today  Bethany Hodges RN  [facility] Endoscopy Scheduling Anticoag  Caller: Unspecified (Today, 12:38 PM)  The patient is currently under an internal cardiologist Dr TARA wright and requires a blood thinner Xarelto (rivaroxaban) for their upcoming scheduled Colonoscopy on 24.         Notes: Newly diagnosed A Fib.

## 2024-01-16 NOTE — TELEPHONE ENCOUNTER
Dear Dr Mcknight,    Patient has a scheduled procedure Colonoscopy on 2/16/24 and is currently taking a blood thinner prescribed by your office. In order to ensure patient safety, we would like to confirm that the patient can place their blood thinner medication on hold for the procedure. Can he/she discontinue Xarelto (rivaroxaban) for a minimum of 2 days prior to the procedure?     Thank you for your prompt reply.    [facility] Endoscopy Scheduling

## 2024-01-18 ENCOUNTER — TELEPHONE (OUTPATIENT)
Dept: UROGYNECOLOGY | Facility: CLINIC | Age: 74
End: 2024-01-18
Payer: COMMERCIAL

## 2024-02-14 ENCOUNTER — OFFICE VISIT (OUTPATIENT)
Dept: UROGYNECOLOGY | Facility: CLINIC | Age: 74
End: 2024-02-14
Payer: COMMERCIAL

## 2024-02-14 VITALS — HEIGHT: 61 IN | BODY MASS INDEX: 19.94 KG/M2 | WEIGHT: 105.63 LBS

## 2024-02-14 DIAGNOSIS — N95.2 VAGINAL ATROPHY: Primary | ICD-10-CM

## 2024-02-14 DIAGNOSIS — N81.4 UTEROVAGINAL PROLAPSE: ICD-10-CM

## 2024-02-14 DIAGNOSIS — M81.0 OSTEOPOROSIS, POST-MENOPAUSAL: ICD-10-CM

## 2024-02-14 PROCEDURE — 99203 OFFICE O/P NEW LOW 30 MIN: CPT | Mod: S$GLB,,, | Performed by: OBSTETRICS & GYNECOLOGY

## 2024-02-14 PROCEDURE — 1159F MED LIST DOCD IN RCRD: CPT | Mod: CPTII,S$GLB,, | Performed by: OBSTETRICS & GYNECOLOGY

## 2024-02-14 PROCEDURE — 1126F AMNT PAIN NOTED NONE PRSNT: CPT | Mod: CPTII,S$GLB,, | Performed by: OBSTETRICS & GYNECOLOGY

## 2024-02-14 PROCEDURE — 1160F RVW MEDS BY RX/DR IN RCRD: CPT | Mod: CPTII,S$GLB,, | Performed by: OBSTETRICS & GYNECOLOGY

## 2024-02-14 PROCEDURE — 3288F FALL RISK ASSESSMENT DOCD: CPT | Mod: CPTII,S$GLB,, | Performed by: OBSTETRICS & GYNECOLOGY

## 2024-02-14 PROCEDURE — 99999 PR PBB SHADOW E&M-EST. PATIENT-LVL III: CPT | Mod: PBBFAC,,, | Performed by: OBSTETRICS & GYNECOLOGY

## 2024-02-14 PROCEDURE — 1101F PT FALLS ASSESS-DOCD LE1/YR: CPT | Mod: CPTII,S$GLB,, | Performed by: OBSTETRICS & GYNECOLOGY

## 2024-02-14 PROCEDURE — 3008F BODY MASS INDEX DOCD: CPT | Mod: CPTII,S$GLB,, | Performed by: OBSTETRICS & GYNECOLOGY

## 2024-02-14 RX ORDER — POLYETHYLENE GLYCOL-3350 AND ELECTROLYTES WITH FLAVOR PACK 240; 5.84; 2.98; 6.72; 22.72 G/278.26G; G/278.26G; G/278.26G; G/278.26G; G/278.26G
POWDER, FOR SOLUTION ORAL
COMMUNITY
Start: 2023-10-27 | End: 2024-04-05

## 2024-02-14 RX ORDER — ESTRADIOL 0.1 MG/G
CREAM VAGINAL
Qty: 42.5 G | Refills: 11 | Status: SHIPPED | OUTPATIENT
Start: 2024-02-14

## 2024-02-14 NOTE — PROGRESS NOTES
Urogyn follow up  02/22/2018  .  [facility]  [address]  [facility] [address]    Luly Lora  [id number]  1950      Luly Lora is a 73 y.o.  here for a urogyn follow up. Last visit with NP Marchand 2018.      Last HPI from 01/23/2018     HPI:   Ohs Peq [id number]      Question 1/21/2018  8:07 PM CST   Do you...     Usually experience pressure in the lower abdomen? Symptoms not present   Usually experience heaviness or dullness in the pelvic area? Symptoms not present   Usually have a bulge or something falling out that you can see or feel in your vaginal area? Symptoms present and they bother me quite a bit   Ever have to push on the vagina or around the rectum to complete a bowel movement? Symptoms not present   Usually experience a feeling of incomplete bladder emptying? Symptoms present and they bother me somewhat   Ever have to push up on a bulge in the vaginal area with your fingers to start or complete urination? Symptoms not present   Do you...     Feel you need to strain too hard to have a bowel movement? Symptoms not present   Feel you have not completely emptied your bowels at the end of a bowel movement?  Symptoms present and they bother me somewhat   Usually lose stool beyond your control if your stool is well formed? Symptoms not present   Usually lose stool beyond your control if your stool is loose? Symptoms present and they bother me somewhat   Usually lose gas from your rectum beyond your control? Symptoms not present   Usually have pain when you pass your stool? Symptoms not present   Experience a strong sense of urgency and have to rush to the bathroom to have a bowel movement? Symptoms not present   Does part of your bowel ever pass through the rectum and bulge outside during or after a bowel movement? Symptoms present and they bother me somewhat   Do you...      Usually experience frequent urination? Symptoms present and they bother me somewhat   Usually experience urine  leakage associated with a feeling of urgency, that is, a strong sensation of needing to go to the bathroom? Symptoms present and they bother me somewhat   Usually experience urine leakage related to coughing, sneezing or laughing? Symptoms not present   Usually experience small amounts of urine leakage (that is, drops)? Symptoms present and they bother me somewhat   Usually experience difficulty emptying your bladder? Symptoms present and they bother me somewhat   Usually experience pain or discomfort in the lower abdomen or genital region? Symptoms present and they bother me somewhat   POPDI  (range: 0 - 100) 25   CRADI (range: 0 - 100) 18.75   IGNACIO (range: 0 - 100) 41.66   TOTAL SCORE  (range: 0 - 300) 85.41   Ohs Peq Urogyn Hpi      Question 1/21/2018  8:21 PM CST   General Urogynecology: Are you experiencing the following?     Dysuria (painful urination) No   Nocturia:  waking up at night to empty your bladder  Yes   If you answered yes to the previous question, how many times does this happen per night? 1-2   Enuresis (urine loss during sleep) No   Dribbling urine after you urinate Yes   Hematuria (urine appears red) No   Type of stream Interrupted   Urinary frequency: How often a day are you going to the bathroom per day?  Less than 10   Urinary Tract Infections: How many Urinary Tract Infections have you had in the past year? I have not had a UTI in the past year   If you have had a UTI in the past year, what treatments have you had so far?  I have not had a UTI in the past year   Urinary Incontinence (General): Are you experiencing the following?     Past consultation for incontinence: Have you ever seen someone for the evaluation of incontinence? No   If you answered yes to the previous question, please select all the therapies you have tried.  N/a- I answered no to the previous question   Please note the effectiveness of the therapies.     Need to wear protection to keep clothes dry  No   If you answered  "yes to the previous question, please kuldeep the protection you use.  N/a- I answered no to the previous question   If you wear protection, how much wetness is typically on each pad? N/a- I do not wear protection   If you wear protection, how often do you have to change per day, if applicable?      Stress Symptoms: Are you experiencing the following?     Leakage of urine with cough, laugh and/or sneeze No   If you answered yes to the previous question, what is the frequency in days, weeks and/or months? Never   Leakage of urine with sex No   Leakage of urine with bending/ lifting No   Leakage of urine with briskly walking or jogging No   If you lose urine for any other reason not previously mentioned, please note it below, if applicable.      Urge Symptoms: Are you experiencing the following?     Urgency ("got to go" feeling) Yes   Urge: How frequently do you feel an urge to urinate (feeling like you "gotta go" to the bathroom and can't wait) Several times a week   Do you experience a leakage of urine when you have a feeling of urgency?  No   Leakage of urine when unaware No   Past use of anticholinergics (medications used to treat overactive bladder) No   If you answered yes to the previous question, please kuldeep the anticholinergics you have used:      Have you ever used Mirbetriq (aka Mirabegron)?  No   Prolapse Symptoms: Are you experiencing any of the following?      Falling out/ Bulging/ Heaviness in the vagina (past opening) x years; wears tight underwear Yes   Vaginal/ Abdominal Pain/ Pressure Yes   Need to strain/ Push to void No   Need to wait on the toilet before you void No   Unusual position to urinate (using your hands to push back the vaginal bulge) No   Sensation of incomplete emptying Yes--has to PV/DV to help   Past use of pessary device No   If you answered yes to the previous question, please list the devices you have used below.      Bowel Symptoms: Are you experiencing any of the following?   "   Constipation No   Diarrhea  No   Hematochezia (bloody stool) No   Incomplete evacuation of stool Yes   Involuntary loss of formed stool No   Fecal smearing/urgency Yes   Involuntary loss of gas No   Vaginal Symptoms: Are you experiencing any of the following?      Abnormal vaginal bleeding  No   Vaginal dryness Yes   Sexually active  Yes   Dyspareunia (painful intercourse) Yes   Estrogen use  No   Ohs Peq Pelvic Pain Urogyn      Question 1/21/2018  8:22 PM CST   Are you experiencing pelvic pain?  No      Patient Hx          Changes from last visit:  1)  UI:  (--) BERNARD   (--) UUI    (+) pantyliner:  Daytime frequency: Q 2-3 hours.  Nocturia: Yes: 2-3/night.   (--) dysuria,  (--) hematuria,  (--) frequent UTIs.  (+) complete bladder emptying.     2)  POP:  Absent with pessary in place.  Symptoms:(--)    (--) vaginal bleeding. (+) vaginal discharge--pink tinged. More with certain movements.   (+)/(--) sexually active--unable to have intercourse with pessary in place.  (+) dyspareunia.   (--)  Vaginal dryness.  (--) vaginal estrogen use. Tried estrogen cream but had trouble getting in.    --cannot remove pessary independently  --last removal 5 months ago.  --initial POP-Q (2018):  Aa 0; Ba 0; C +2; Ap 0; Bp 0; D -6.  Genital hiatus 3, perineal body 2, total vaginal length 11-12.     3)  BM:  (--) constipation/straining.  (--) chronic diarrhea. Consistency is sticky.  (--) hematochezia.  (--) fecal incontinence  (+) fecal smearing/urgency--sometimes sudden, without reason.  (--) incomplete evacuation.      4) pessary:  Denies pain or bleeding.  Scant pink discharge.  Using #3 ring with support.  Independent in use.      Past Medical History:   Diagnosis Date    Brain bleed 1998    Osteoporosis     Pelvic fracture        Past Surgical History:   Procedure Laterality Date    EYE SURGERY  2005    ingrown eye lashes BL    WISDOM TOOTH EXTRACTION         Current Outpatient Medications   Medication Sig    alendronate  "(FOSAMAX) 70 MG tablet TAKE 1 TABLET BY MOUTH WEEKLY  WITH 8 OZ OF PLAIN WATER 30  MINUTES BEFORE FIRST FOOD, DRINK OR MEDS. STAY UPRIGHT FOR 30  MINS    CALCIUM CARBONATE (CALCIUM 600 ORAL) Take by mouth.    cholecalciferol, vitamin D3, 125 mcg (5,000 unit) Tab Take 5,000 Units by mouth once daily.    GAVILYTE-C 240-22.72-6.72 -5.84 gram SolR Take by mouth.    estradioL (ESTRACE) 0.01 % (0.1 mg/gram) vaginal cream 0.5 grams with applicator or dime-sized amount with finger in vagina nightly x 2 weeks, then twice a week thereafter    flecainide (TAMBOCOR) 150 MG Tab Take 300 mg (2 tabs) by mouth as needed for AF. Limit one dose per day. (Patient not taking: Reported on 2/14/2024)    rivaroxaban (XARELTO) 20 mg Tab Take 1 tablet (20 mg total) by mouth daily with dinner or evening meal. (Patient not taking: Reported on 2/14/2024)     No current facility-administered medications for this visit.       Well woman:  Pap test: 2016, normal.  History of abnormal paps: No.  History of STIs:  No  Mammogram: Date of last: 7/2023.  Result: Normal  Colonoscopy: Date of last: 2010.  Result:  normal.  Repeat due:  2020.  Scheduled for Fri 2-.  DEXA:  Date of last: 2022.  Result:  Osteoporosis, on meds.  Repeat due:  2022.     ROS:  As per HPI.      Exam  Ht 5' 0.98" (1.549 m)   Wt 47.9 kg (105 lb 9.6 oz)   BMI 19.96 kg/m²   General: alert and oriented, no acute distress  Respiratory: normal respiratory effort  Abd: soft, non-tender, non-distended    Pelvic  Ext. Genitalia: normal external genitalia. Normal bartholin's and skeens glands  Vagina: pessary in place.  + atrophy. Normal vaginal mucosa without lesions. No discharge noted.   Non-tender bladder base without palpable mass.  Mild erythema throughout--no focal abrasions.   #3 ring with support pessary in place.  Cervix: no lesions  Uterus:  uterus is normal size, shape, consistency and nontender   Urethra: no masses or tenderness  Urethral meatus: no lesions, caruncle " or prolapse.    --initial POP-Q (2018):  Aa 0; Ba 0; C +2; Ap 0; Bp 0; D -6.  Genital hiatus 3, perineal body 2, total vaginal length 11-12.     Rectal: external normal. Internal: no masses, NT, normal tone, grossly heme NEG.     Pessary removed without difficulty.  Washed with soap and water. Reinserted without difficulty.     Impression  1. Vaginal atrophy  estradioL (ESTRACE) 0.01 % (0.1 mg/gram) vaginal cream      2. Osteoporosis, post-menopausal        3. Uterovaginal prolapse            We reviewed the above issues and discussed options for short-term versus long-term management of her problems.   Plan:   1)  Stage 3 uterine prolapse:  --continue pessary: #3 ring + support   PESSARY CARE: Remove pessary as frequently as nightly and as infrequently as every 2-3 weeks.  Remove before intercourse.  May need to remove before bowel movements if straining dislodges.   Wash with soap and water (no ).  Replace using small amount of water-based lubricant (like KY jelly) at end being introduced into vagina.    --having more trouble removing/replacing independently   --RTC Q3-4 months for pessary checks for now    --if tires of pessary use and is done with working (can't take off time for surgery right now):  --surgical option: vaginal hysterectomy, uterosacral suspension, anterior/posterior repair  --would need ultrasound/bladder testing beforehand  --would need plan from PCP or cards to bridge off/back on xarelto and if needs injections while off (usually give heparin 5000 U SQ preop, then Q8h periop)  --would need clearance per PCP (Min) + labs (CBC, CMP, T&S)/EKG    2)  Vaginal atrophy (dryness):     --use Vaginal estrogen:  --Use 0.5 grams of estrogen cream in vagina with applicator or dime-sized amount with finger (as far as can reach internally) nightly x 2 weeks, then twice a week thereafter.  You can also apply a dime-sized amount with your finger around the vaginal opening and inner lips at same  frequency.    --try to get finger above or beneath pessary ring    --Vaginal estrogen may help to decrease pain related to dryness with intercourse and urinary symptoms (urgency/frequency/UTIs) around menopause.      3)  Urinary urgency/post-void dribbling:  --trial of pessary   --Empty bladder every 3 hours.  Empty well: wait a minute, lean forward on toilet.    --Avoid dietary irritants (see sheet).  Keep diary x 3-5 days to determine your irritants.  --KEGELS: do 10 in AM and 10 in PM, holding each x 10 seconds.  When you feel urge to go, STOP, KEGEL, and when urge has passed, then go to bathroom.  Consider PT in future.    --URGE: consider medication in future.  Takes 2-4 weeks to see if will have effect.  For dry mouth: get sour, sugar free lozenge or gum.       4) Elevated PVR:  --PVR improved with pessary and on last check    5)  Fecal smearing:  --hydrate well  --avoid dietary triggers  --watch dairy  Controlling constipation may help bladder urgency/leakage and fiber may better control cholesterol and blood glucose.  Start daily fiber.  Take 1 tsp of fiber powder (psyllium or other sugar-free powder).  Mix in 8 oz of water.  Take x 3-5 days.  Then, increase fiber by 1 tsp every 3-5 days until stool is easy to pass, well-formed, less sticky, no smudges in underwear.  Stop and continue at that dose.   Do not exceed 6 tsps/day.  May also use over the counter stool softener 1-2 x/day.  AVOID laxatives.    6)  RTC 3-4 months for pessary check with Farheen Mo.     30 minutes were spent in face to face time with this patient  75 % of this time was spent in counseling and/or coordination of care  ME@  [facility]  Division of Female Pelvic Medicine and Reconstructive Surgery  Department of Obstetrics & Gynecology

## 2024-02-14 NOTE — PATIENT INSTRUCTIONS
Fiber Information Sheet  Your doctor has recommended that you follow a high fiber diet. The addition of fiber to your diet can make an enormous difference in your bowel control and regularity. Fiber helps people whether they lose stool or have trouble with constipation. Fiber works by bulking the stool and keeping it formed, yet making the movement soft and easy to pass. Fiber helps keep moisture within the stool so that neither diarrhea nor hard stool occurs. Fiber makes the bowels work more regularly, but it is not a laxative. An additional bonus from eating a high fiber diet is that your risk of cancer is reduced, too.    Most of us eat some high fiber foods already, but nearly all of us do not eat the necessary amount. For example, a slice of whole wheat bread contains only about 10% of the daily recommended amount of fiber. This means if you are relying on only whole wheat bread to meet the recommended fiber requirements, you would need to eat  between 10-18 slices every day! Please note that fiber is NOT in any meat or dairy product. It is only found in grains, vegetables and fruits. The recommended daily fiber intake is 20-25 grams. Foods having high fiber content include:     Fiber One Cereal, ½ cup 13.0 g   Hi beans, ¾ cup 10.4 g   Wheat bran cereal, 1 oz 10.0 g   Kidney beans, ¾ cup 9.3 g   All Bran Cereal, ½ cup 6.0 g   Oat Bran Cereal, hot, 1 oz 4.0 g   Banana, 1medium 3.8 g   Canned pears, ½ cup 3.7 g   3 prunes or ¼ cup raisins 3.5 g   Whole Wheat Total, 1 cup 3.0 g   Carrots, ½ cup 3.2 g   Apple, small 2.8 g   Broccoli, ½ cup 2.8 g   Cauliflower, ½ cup 2.6 g   Oatmeal, 1 oz 2.5 g   Whole Wheat Toast 2.0 g   Cheerios, 1 1/3 cup 2.0 g   Baked potato with skin 2.0 g   Corn, ½ cup 1.9 g   Popcorn, 3 cups 1.9 g   Orange, medium 1.9 g   Granola bar 1.0 g   Lettuce, ½ cup 0.9 g    If you dont think that you can get enough fiber through your everyday diet, there are many good fiber supplements you can  take along with eating your high fiber diet. Some of these are: Metamucil (1 heaping teaspoon or 1-2 wafers), Citrucel (1 tablespoon), Fiberall (1-2 wafers or 1 teaspoon), Perdium (2 rounded teaspoons) and 1-2 teaspoons unprocessed bran (to mix with foods)    You may need to use the fiber supplement up to 3-4 times daily to produce normal elimination. Please follow specific package directions or call us for help in regulating the dose. You may notice some bloating and/or increased gas at first. These symptoms can be relieved by adding fiber to your diet slowly. Once your body gets used to this increased fiber, these symptoms will go away.   --------------------------------------------------------------------------------        1)  Stage 3 uterine prolapse:  --continue pessary: #3 ring + support   PESSARY CARE: Remove pessary as frequently as nightly and as infrequently as every 2-3 weeks.  Remove before intercourse.  May need to remove before bowel movements if straining dislodges.   Wash with soap and water (no ).  Replace using small amount of water-based lubricant (like KY jelly) at end being introduced into vagina.      --surgical option: vaginal hysterectomy, uterosacral suspension, anterior/posterior repair  --would need ultrasound/bladder testing beforehand       2)  Vaginal atrophy (dryness):     --use Vaginal estrogen:  --Use 0.5 grams of estrogen cream in vagina with applicator or dime-sized amount with finger (as far as can reach internally) nightly x 2 weeks, then twice a week thereafter.  You can also apply a dime-sized amount with your finger around the vaginal opening and inner lips at same frequency.    --try to get finger above or beneath pessary ring    --Vaginal estrogen may help to decrease pain related to dryness with intercourse and urinary symptoms (urgency/frequency/UTIs) around menopause.         3)  Urinary urgency/post-void dribbling:  --trial of pessary   --Empty bladder every 3  hours.  Empty well: wait a minute, lean forward on toilet.    --Avoid dietary irritants (see sheet).  Keep diary x 3-5 days to determine your irritants.  --KEGELS: do 10 in AM and 10 in PM, holding each x 10 seconds.  When you feel urge to go, STOP, KEGEL, and when urge has passed, then go to bathroom.  Consider PT in future.    --URGE: consider medication in future.  Takes 2-4 weeks to see if will have effect.  For dry mouth: get sour, sugar free lozenge or gum.       4) Elevated PVR:  --PVR improved with pessary and on last check    5)  Fecal smearing:  --hydrate well  --avoid dietary triggers  --watch dairy  Controlling constipation may help bladder urgency/leakage and fiber may better control cholesterol and blood glucose.  Start daily fiber.  Take 1 tsp of fiber powder (psyllium or other sugar-free powder).  Mix in 8 oz of water.  Take x 3-5 days.  Then, increase fiber by 1 tsp every 3-5 days until stool is easy to pass, well-formed, less sticky, no smudges in underwear.  Stop and continue at that dose.   Do not exceed 6 tsps/day.  May also use over the counter stool softener 1-2 x/day.  AVOID laxatives.    6)  RTC 3-4 months for pessary check with Farheen Mo.

## 2024-02-16 ENCOUNTER — ANESTHESIA (OUTPATIENT)
Dept: ENDOSCOPY | Facility: HOSPITAL | Age: 74
End: 2024-02-16
Payer: COMMERCIAL

## 2024-02-16 ENCOUNTER — HOSPITAL ENCOUNTER (OUTPATIENT)
Facility: HOSPITAL | Age: 74
Discharge: HOME | End: 2024-02-16
Attending: COLON & RECTAL SURGERY | Admitting: COLON & RECTAL SURGERY
Payer: COMMERCIAL

## 2024-02-16 ENCOUNTER — ANESTHESIA EVENT (OUTPATIENT)
Dept: ENDOSCOPY | Facility: HOSPITAL | Age: 74
End: 2024-02-16
Payer: COMMERCIAL

## 2024-02-16 VITALS
WEIGHT: 105 LBS | SYSTOLIC BLOOD PRESSURE: 101 MMHG | TEMPERATURE: 98 F | DIASTOLIC BLOOD PRESSURE: 62 MMHG | HEIGHT: 60 IN | RESPIRATION RATE: 14 BRPM | OXYGEN SATURATION: 100 % | HEART RATE: 62 BPM | BODY MASS INDEX: 20.62 KG/M2

## 2024-02-16 DIAGNOSIS — Z12.11 SCREENING FOR COLON CANCER: Primary | ICD-10-CM

## 2024-02-16 PROCEDURE — E9220 PRA ENDO ANESTHESIA: HCPCS | Mod: 33,,, | Performed by: NURSE ANESTHETIST, CERTIFIED REGISTERED

## 2024-02-16 PROCEDURE — 88305 TISSUE EXAM BY PATHOLOGIST: CPT | Mod: 26,,, | Performed by: STUDENT IN AN ORGANIZED HEALTH CARE EDUCATION/TRAINING PROGRAM

## 2024-02-16 PROCEDURE — 45380 COLONOSCOPY AND BIOPSY: CPT | Mod: PT | Performed by: COLON & RECTAL SURGERY

## 2024-02-16 PROCEDURE — 25000003 PHARM REV CODE 250: Performed by: NURSE ANESTHETIST, CERTIFIED REGISTERED

## 2024-02-16 PROCEDURE — 37000009 HC ANESTHESIA EA ADD 15 MINS: Performed by: COLON & RECTAL SURGERY

## 2024-02-16 PROCEDURE — 37000008 HC ANESTHESIA 1ST 15 MINUTES: Performed by: COLON & RECTAL SURGERY

## 2024-02-16 PROCEDURE — 88305 TISSUE EXAM BY PATHOLOGIST: CPT | Performed by: STUDENT IN AN ORGANIZED HEALTH CARE EDUCATION/TRAINING PROGRAM

## 2024-02-16 PROCEDURE — 63600175 PHARM REV CODE 636 W HCPCS: Performed by: NURSE ANESTHETIST, CERTIFIED REGISTERED

## 2024-02-16 PROCEDURE — 45380 COLONOSCOPY AND BIOPSY: CPT | Mod: 33,,, | Performed by: COLON & RECTAL SURGERY

## 2024-02-16 PROCEDURE — 27201012 HC FORCEPS, HOT/COLD, DISP: Performed by: COLON & RECTAL SURGERY

## 2024-02-16 RX ORDER — SODIUM CHLORIDE 9 MG/ML
INJECTION, SOLUTION INTRAVENOUS CONTINUOUS
Status: DISCONTINUED | OUTPATIENT
Start: 2024-02-16 | End: 2024-02-16 | Stop reason: HOSPADM

## 2024-02-16 RX ORDER — PHENYLEPHRINE HYDROCHLORIDE 10 MG/ML
INJECTION INTRAVENOUS
Status: DISCONTINUED | OUTPATIENT
Start: 2024-02-16 | End: 2024-02-16

## 2024-02-16 RX ORDER — PROPOFOL 10 MG/ML
VIAL (ML) INTRAVENOUS CONTINUOUS PRN
Status: DISCONTINUED | OUTPATIENT
Start: 2024-02-16 | End: 2024-02-16

## 2024-02-16 RX ORDER — LIDOCAINE HYDROCHLORIDE 20 MG/ML
INJECTION INTRAVENOUS
Status: DISCONTINUED | OUTPATIENT
Start: 2024-02-16 | End: 2024-02-16

## 2024-02-16 RX ORDER — PROPOFOL 10 MG/ML
VIAL (ML) INTRAVENOUS
Status: DISCONTINUED | OUTPATIENT
Start: 2024-02-16 | End: 2024-02-16

## 2024-02-16 RX ADMIN — SODIUM CHLORIDE: 9 INJECTION, SOLUTION INTRAVENOUS at 06:02

## 2024-02-16 RX ADMIN — PROPOFOL 150 MCG/KG/MIN: 10 INJECTION, EMULSION INTRAVENOUS at 07:02

## 2024-02-16 RX ADMIN — PHENYLEPHRINE HYDROCHLORIDE 50 MCG: 10 INJECTION INTRAVENOUS at 07:02

## 2024-02-16 RX ADMIN — LIDOCAINE HYDROCHLORIDE 30 MG: 20 INJECTION INTRAVENOUS at 07:02

## 2024-02-16 RX ADMIN — PROPOFOL 35 MG: 10 INJECTION, EMULSION INTRAVENOUS at 07:02

## 2024-02-16 NOTE — ANESTHESIA PREPROCEDURE EVALUATION
02/16/2024  Luly Lora is a 73 y.o., female.      Patient Name: Luly Lora  YOB: 1950  MRN: [id number]  [facility]: [id number]      Code Status: No Order   Date of Procedure: 2/16/2024  Anesthesia: Choice Procedure: Procedure(s) (LRB):  COLONOSCOPY (N/A)  Pre-Operative Diagnosis: Colon cancer screening [Z12.11]  Proceduralist: Surgeon(s) and Role:     * ALKA Arguelles MD - Primary        SUBJECTIVE:   Luly Lora is a 73 y.o. female who  has a past medical history of Brain bleed (1998), Osteoporosis, and Pelvic fracture. No notes on file    ALLERGIES:   Review of patient's allergies indicates:  No Known Allergies  MEDICATIONS:     Current Facility-Administered Medications   Medication Dose Route Frequency Provider Last Rate Last Admin    0.9%  NaCl infusion   Intravenous Continuous ALKA Agruelles MD              History:     Patient Active Problem List   Diagnosis    Left foot drop    Osteoporosis, post-menopausal    Uterovaginal prolapse    History of BCG vaccination    Vaginal atrophy    Adult bronchiectasis    PAF (paroxysmal atrial fibrillation)     Past Medical History:   Diagnosis Date    Brain bleed 1998    Osteoporosis     Pelvic fracture      Surgical History:    has a past surgical history that includes Eye surgery (2005) and [place] tooth extraction.   Social History:    reports being sexually active and has had partner(s) who are male.  reports that she has never smoked. She has never used smokeless tobacco. She reports current alcohol use of about 1.0 standard drink of alcohol per week. She reports that she does not use drugs.       Pre-op Assessment    I have reviewed the Patient Summary Reports.     I have reviewed the Nursing Notes. I have reviewed the NPO Status.   I have reviewed the Medications.     Review of Systems  Anesthesia Hx:  No problems with previous Anesthesia              Denies Family Hx of Anesthesia complications.    Denies Personal Hx of Anesthesia complications.                    Hematology/Oncology:  Hematology Normal                                     Cardiovascular:  Cardiovascular Normal                                            Hepatic/GI:  Bowel Prep.                Musculoskeletal:  Musculoskeletal Normal                Neurological:  Neurology Normal                                      Dermatological:  Skin Normal        Physical Exam  General: Cooperative, Alert and Oriented    Airway:  Mallampati: II   Mouth Opening: Normal  TM Distance: Normal  Tongue: Normal  Neck ROM: Normal ROM    Dental:  Intact        Anesthesia Plan  Type of Anesthesia, risks & benefits discussed:    Anesthesia Type: Gen Natural Airway  Intra-op Monitoring Plan: Standard ASA Monitors  Induction:  IV  Informed Consent: Informed consent signed with the Patient and all parties understand the risks and agree with anesthesia plan.  All questions answered.   ASA Score: 1  Day of Surgery Review of History & Physical: H&P Update referred to the surgeon/provider.I have interviewed and examined the patient. I have reviewed the patient's H&P dated: There are no significant changes.     Ready For Surgery From Anesthesia Perspective.     .

## 2024-02-16 NOTE — H&P
COLONOSCOPY HISTORY AND PHYSICAL EXAM    Procedure : Colonoscopy      INDICATIONS: asymptomatic screening exam    Family Hx of CRC: no    Last Colonoscopy:  2010  Findings: normal       Past Medical History:   Diagnosis Date    Brain bleed 1998    Osteoporosis     Pelvic fracture      Sedation Problems: NO  Family History   Problem Relation Age of Onset    Hypertension Mother     Heart disease Mother         MI age 77    Kidney failure Mother     Diabetes Mother     Parkinsonism Father     Hypertension Brother     Hypertension Brother     Diabetes Brother     No Known Problems Son     Breast cancer Neg Hx     Ovarian cancer Neg Hx     Cervical cancer Neg Hx     Endometrial cancer Neg Hx     Vaginal cancer Neg Hx     Asthma Neg Hx     Emphysema Neg Hx      Fam Hx of Sedation Problems: NO  Social History     Socioeconomic History    Marital status:    Occupational History    Occupation: Lab Supervisor   Tobacco Use    Smoking status: Never    Smokeless tobacco: Never   Substance and Sexual Activity    Alcohol use: Yes     Alcohol/week: 1.0 standard drink of alcohol     Types: 1 Glasses of wine per week     Comment: occasionally- 3-4 a month    Drug use: No    Sexual activity: Yes     Partners: Male   Social History Narrative    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      Research in Med School for years. - ophth and inf dz, studying antibodies.         Exercise:  Walking 3 days a week, swam in past, resistance machines' dumbbells, table tennis.          Chinese.  Emigrated 1988.         with CAD/CABG.  . OPP     Social Determinants of Health     Financial Resource Strain: Low Risk  (2/9/2024)    Overall Financial Resource Strain (CARDIA)     Difficulty of Paying Living Expenses: Not hard at all   Food Insecurity: No Food Insecurity (2/9/2024)    Hunger Vital Sign     Worried About Running Out of Food in the Last Year: Never true     Ran Out of Food in the Last Year:  Never true   Transportation Needs: No Transportation Needs (2/9/2024)    PRAPARE - Transportation     Lack of Transportation (Medical): No     Lack of Transportation (Non-Medical): No   Physical Activity: Insufficiently Active (2/9/2024)    Exercise Vital Sign     Days of Exercise per Week: 2 days     Minutes of Exercise per Session: 50 min   Stress: No Stress Concern Present (2/9/2024)    [employer] - Occupational Stress Questionnaire     Feeling of Stress : Only a little   Social Connections: Unknown (2/9/2024)    Social Connection and Isolation Panel [NHANES]     Frequency of Communication with Friends and Family: Three times a week     Frequency of Social Gatherings with Friends and Family: Never     Active Member of Clubs or Organizations: Yes     Attends Club or Organization Meetings: 1 to 4 times per year     Marital Status:    Housing Stability: Low Risk  (2/9/2024)    Housing Stability Vital Sign     Unable to Pay for Housing in the Last Year: No     Number of Places Lived in the Last Year: 1     Unstable Housing in the Last Year: No       Review of Systems - Negative except   Respiratory ROS: no dyspnea  Cardiovascular ROS: no exertional chest pain  Gastrointestinal ROS: NO abdominal discomfort,  NO rectal bleeding  Musculoskeletal ROS: no muscular pain  Neurological ROS: no recent stroke    Physical Exam:  There were no vitals taken for this visit.  General: no distress  Head: normocephalic  Mallampati Score   Neck: supple, symmetrical, trachea midline  Lungs:  clear to auscultation bilaterally and normal respiratory effort  Heart: regular rate and rhythm and no murmur  Abdomen: soft, non-tender non-distented; bowel sounds normal; no masses,  no organomegaly  Extremities: no cyanosis or edema, or clubbing    ASA:  II    PLAN  COLONOSCOPY.    SedationPlan :MAC    The details of the procedure, the possible need for biopsy or polypectomy and the potential risks including  bleeding, perforation, missed polyps were discussed in detail.

## 2024-02-16 NOTE — ANESTHESIA POSTPROCEDURE EVALUATION
Anesthesia Post Evaluation    Patient: Luly Lora    Procedure(s) Performed: Procedure(s) (LRB):  COLONOSCOPY (N/A)    Final Anesthesia Type: general      Patient location during evaluation: PACU  Patient participation: Yes- Able to Participate  Level of consciousness: awake and alert and oriented  Post-procedure vital signs: reviewed and stable  Pain management: adequate  Airway patency: patent    PONV status at discharge: No PONV  Anesthetic complications: no      Cardiovascular status: blood pressure returned to baseline  Respiratory status: unassisted, room air and spontaneous ventilation  Hydration status: euvolemic  Follow-up not needed.              Vitals Value Taken Time   BP 95/52 02/16/24 0750   Temp 37 02/16/24 0813   Pulse 61 02/16/24 0750   Resp 23 02/16/24 0750   SpO2 100 % 02/16/24 0750         No case tracking events are documented in the log.      Pain/Casimiro Score: Casimiro Score: 10 (2/16/2024  7:56 AM)

## 2024-02-16 NOTE — TRANSFER OF CARE
Anesthesia Transfer of Care Note    Patient: Luly Lora    Procedure(s) Performed: Procedure(s) (LRB):  COLONOSCOPY (N/A)    Patient location: PACU    Anesthesia Type: general    Transport from OR: Transported from OR on room air with adequate spontaneous ventilation    Post pain: adequate analgesia    Post assessment: no apparent anesthetic complications and tolerated procedure well    Post vital signs: stable    Level of consciousness: awake, alert and oriented    Nausea/Vomiting: no nausea/vomiting    Complications: none    Transfer of care protocol was followed      Last vitals: Visit Vitals  BP (!) 83/49   Pulse 63   Temp 36.7 °C (98.1 °F)   Resp 16   Ht 5' (1.524 m)   Wt 47.6 kg (105 lb)   SpO2 95%   BMI 20.51 kg/m²

## 2024-02-20 LAB
FINAL PATHOLOGIC DIAGNOSIS: NORMAL
GROSS: NORMAL
Lab: NORMAL

## 2024-04-04 NOTE — PROGRESS NOTES
Ms. Lora is a patient of Dr. Mcknight and was last seen in clinic 10/6/2023.      Subjective:   Patient ID:  Luly Lora is a 73 y.o. female who presents for follow up of Atrial Fibrillation  .     HPI:    Ms. Lora is a 73 y.o. female with osteoporosis, pAF here for follow up.     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   Now occurs about 1 q month, lasting many hours.  Her watch monitor/ecg shows AF. 2 recordings made, showing AF at 60s-70s bpm.  When she has AF, she feels unwell.  Good exertion tolerance.  TSH normal    10/6/2023: ECG is NSR  Discussed AF and its basic pathophysiology, including its health implications and treatment options (rate vs. rhythm control, meds vs. procedural/device treatment) as appropriate for the patient.  Discussed standing-dose meds vs. PIP. She prefers the latter.    Discussed pill-in-the-pocket approach to AF. Gave pt instructions to present to ER if sustained AF is felt, for the first trial of that approach; Give Flecainide 300 mg PO x 1 in ED. Provided reference to NEJ article (Davon et al. N Engl J Med 2004;351:2384-91).   The emergency department will monitor the patient on telemetry. According to the literature, expected efficacy is conversion of up to 68% of patients by 4 hours after the dose, and up to 91% after 8 hours. I recommend ED-based observation until return to sinus rhythm or for 8 hours after the dose, whichever comes first. If this tactic is tolerated and effective, thereafter the patient would be able to self-direct pill-in-the-pocket treatment.      Echo today  f/u 6 mos or earlier prn.     Update (04/05/2024):    12/20/2023 ED note:Patient reports frequent bouts of paroxysmal AFib usually once a month. Over the past couple of weeks she has had 1-2 episodes but it resolved quickly and she did not take the medication or come to the hospital. Tonight around 7:00 p.m. on  December 19 she began experiencing palpitations with an abnormal heart rhythm. This prompted her visit to the ED. since arriving to the ED her palpitations has resolved. Her 12 lead EKG shows atrial fibrillation.   On reassessment at 12:40 a.m. patient is in normal sinus rhythm. I will hold off on flecainide at this time as the patient has spontaneously converted and monitoring the ED.     Today she says she has not had any AF episodes since January.   Did have some episodes lasting hours previously. Kardia strips show a couple of AF episodes in low 100s. Some episodes are SR with PACs clear p waves identified.   When she is not feeling palpitations, she feels well with no other cardiac complaints. No CP, CROWE, LH, syncope reported.    She is currently taking xarelto 20mg daily for stroke prophylaxis and denies significant bleeding episodes. Kidney function is stable, with a creatinine of 0.7 on 9/2/2023.    I have personally reviewed the patient's EKG today, which shows sinus richard at 48bpm. NE interval is 198. QRS is 82. QT is 456.    Relevant Cardiac Test Results:    2D Echo (10/6/2023):    Left Ventricle: The left ventricle is normal in size. Normal wall thickness. There is normal systolic function with a visually estimated ejection fraction of 60 - 65%.    Right Ventricle: Normal right ventricular cavity size. Wall thickness is normal. Systolic function is normal.    Aortic Valve: There is mild aortic valve sclerosis. There is mild aortic regurgitation.    Mitral Valve: There is mild regurgitation.    Tricuspid Valve: There is mild regurgitation.    Current Outpatient Medications   Medication Sig    alendronate (FOSAMAX) 70 MG tablet TAKE 1 TABLET BY MOUTH WEEKLY  WITH 8 OZ OF PLAIN WATER 30  MINUTES BEFORE FIRST FOOD, DRINK OR MEDS. STAY UPRIGHT FOR 30  MINS    CALCIUM CARBONATE (CALCIUM 600 ORAL) Take by mouth.    cholecalciferol, vitamin D3, 125 mcg (5,000 unit) Tab Take 5,000 Units by mouth once daily.     estradioL (ESTRACE) 0.01 % (0.1 mg/gram) vaginal cream 0.5 grams with applicator or dime-sized amount with finger in vagina nightly x 2 weeks, then twice a week thereafter    flecainide (TAMBOCOR) 150 MG Tab Take 300 mg (2 tabs) by mouth as needed for AF. Limit one dose per day. (Patient not taking: Reported on 2/14/2024)    GAVILYTE-C 240-22.72-6.72 -5.84 gram SolR Take by mouth.    rivaroxaban (XARELTO) 20 mg Tab Take 1 tablet (20 mg total) by mouth daily with dinner or evening meal. (Patient not taking: Reported on 2/14/2024)     No current facility-administered medications for this visit.       Review of Systems   Constitutional: Negative for malaise/fatigue.   Cardiovascular:  Positive for irregular heartbeat and palpitations. Negative for chest pain, dyspnea on exertion and leg swelling.   Respiratory:  Negative for shortness of breath.    Hematologic/Lymphatic: Negative for bleeding problem.   Skin:  Negative for rash.   Musculoskeletal:  Negative for myalgias.   Gastrointestinal:  Negative for hematemesis, hematochezia and nausea.   Genitourinary:  Negative for hematuria.   Neurological:  Negative for light-headedness.   Psychiatric/Behavioral:  Negative for altered mental status.    Allergic/Immunologic: Negative for persistent infections.       Objective:          BP (!) 102/56   Pulse (!) 48   Ht 5' (1.524 m)   Wt 47.8 kg (105 lb 6.1 oz)   BMI 20.58 kg/m²     Physical Exam  Vitals and nursing note reviewed.   Constitutional:       Appearance: Normal appearance. She is well-developed.   HENT:      Head: Normocephalic.      Nose: Nose normal.   Eyes:      Pupils: Pupils are equal, round, and reactive to light.   Cardiovascular:      Rate and Rhythm: Regular rhythm. Bradycardia present.   Pulmonary:      Effort: No respiratory distress.      Breath sounds: Normal breath sounds.   Musculoskeletal:         General: Normal range of motion.   Skin:     General: Skin is warm and dry.      Findings: No  erythema.   Neurological:      Mental Status: She is alert and oriented to person, place, and time.   Psychiatric:         Speech: Speech normal.         Behavior: Behavior normal.           Lab Results   Component Value Date     09/02/2023    K 4.4 09/02/2023    BUN 15 09/02/2023    CREATININE 0.7 09/02/2023    ALT 12 09/02/2023    AST 22 09/02/2023    HGB 13.3 12/19/2023    HCT 45 12/20/2023    HCT 39.9 12/19/2023    TSH 0.751 09/02/2023    LDLCALC 124.6 09/02/2023           Assessment:     1. PAF (paroxysmal atrial fibrillation)    2. History of anticoagulant use    3. Typical atrial flutter      Plan:     In summary, Ms. Lora is a 73 y.o. female with osteoporosis, pAF here for follow up.   She continues to have paroxysms of AF, some lasting hours. Went to the ED in December but converted prior to taking flecainide. ECG in [facility] confirmed AF at 92bpm.  She would rather not have to return to the ED if possible. We discussed that medication options are limited due to her resting bradycardia.  Discussed risks, benefits, and alternatives to PVI. She would like to consider. In the interim, can try very low dose lopressor if she has an episode but advised to be very cautious about her HRs and BPs after she converts back to sinus rhythm.  CHADSVASc 2 on xarelto.    Consider PVI  Lopressor 12.5mg PRN for sustained palps  Continue other meds  RTC 6 mo, sooner if needed    *A copy of this note has been sent to Dr. Mcknight*    Follow up in about 6 months (around 10/5/2024).    ------------------------------------------------------------------    JOHANA Mann, NP-C  Cardiac Electrophysiology

## 2024-04-05 ENCOUNTER — HOSPITAL ENCOUNTER (OUTPATIENT)
Dept: CARDIOLOGY | Facility: CLINIC | Age: 74
Discharge: HOME | End: 2024-04-05
Payer: COMMERCIAL

## 2024-04-05 ENCOUNTER — OFFICE VISIT (OUTPATIENT)
Dept: ELECTROPHYSIOLOGY | Facility: CLINIC | Age: 74
End: 2024-04-05
Payer: COMMERCIAL

## 2024-04-05 VITALS
WEIGHT: 105.38 LBS | DIASTOLIC BLOOD PRESSURE: 56 MMHG | HEART RATE: 48 BPM | BODY MASS INDEX: 20.69 KG/M2 | SYSTOLIC BLOOD PRESSURE: 102 MMHG | HEIGHT: 60 IN

## 2024-04-05 DIAGNOSIS — I48.3 TYPICAL ATRIAL FLUTTER: ICD-10-CM

## 2024-04-05 DIAGNOSIS — I48.0 PAF (PAROXYSMAL ATRIAL FIBRILLATION): Primary | ICD-10-CM

## 2024-04-05 DIAGNOSIS — Z92.29 HISTORY OF ANTICOAGULANT USE: ICD-10-CM

## 2024-04-05 LAB
OHS QRS DURATION: 82 MS
OHS QTC CALCULATION: 407 MS

## 2024-04-05 PROCEDURE — 1126F AMNT PAIN NOTED NONE PRSNT: CPT | Mod: CPTII,S$GLB,, | Performed by: NURSE PRACTITIONER

## 2024-04-05 PROCEDURE — 93005 ELECTROCARDIOGRAM TRACING: CPT | Mod: S$GLB,,, | Performed by: INTERNAL MEDICINE

## 2024-04-05 PROCEDURE — 3074F SYST BP LT 130 MM HG: CPT | Mod: CPTII,S$GLB,, | Performed by: NURSE PRACTITIONER

## 2024-04-05 PROCEDURE — 99999 PR PBB SHADOW E&M-EST. PATIENT-LVL IV: CPT | Mod: PBBFAC,,, | Performed by: NURSE PRACTITIONER

## 2024-04-05 PROCEDURE — 1159F MED LIST DOCD IN RCRD: CPT | Mod: CPTII,S$GLB,, | Performed by: NURSE PRACTITIONER

## 2024-04-05 PROCEDURE — 3008F BODY MASS INDEX DOCD: CPT | Mod: CPTII,S$GLB,, | Performed by: NURSE PRACTITIONER

## 2024-04-05 PROCEDURE — 99214 OFFICE O/P EST MOD 30 MIN: CPT | Mod: S$GLB,,, | Performed by: NURSE PRACTITIONER

## 2024-04-05 PROCEDURE — 1101F PT FALLS ASSESS-DOCD LE1/YR: CPT | Mod: CPTII,S$GLB,, | Performed by: NURSE PRACTITIONER

## 2024-04-05 PROCEDURE — 3288F FALL RISK ASSESSMENT DOCD: CPT | Mod: CPTII,S$GLB,, | Performed by: NURSE PRACTITIONER

## 2024-04-05 PROCEDURE — 93010 ELECTROCARDIOGRAM REPORT: CPT | Mod: S$GLB,,, | Performed by: INTERNAL MEDICINE

## 2024-04-05 PROCEDURE — 1160F RVW MEDS BY RX/DR IN RCRD: CPT | Mod: CPTII,S$GLB,, | Performed by: NURSE PRACTITIONER

## 2024-04-05 PROCEDURE — 3078F DIAST BP <80 MM HG: CPT | Mod: CPTII,S$GLB,, | Performed by: NURSE PRACTITIONER

## 2024-04-05 RX ORDER — METOPROLOL TARTRATE 25 MG/1
25 TABLET, FILM COATED ORAL DAILY PRN
Qty: 30 TABLET | Refills: 0 | Status: SHIPPED | OUTPATIENT
Start: 2024-04-05 | End: 2025-04-05

## 2024-05-20 ENCOUNTER — PATIENT MESSAGE (OUTPATIENT)
Dept: ADMINISTRATIVE | Facility: HOSPITAL | Age: 74
End: 2024-05-20
Payer: COMMERCIAL

## 2024-06-10 ENCOUNTER — PATIENT MESSAGE (OUTPATIENT)
Dept: INTERNAL MEDICINE | Facility: CLINIC | Age: 74
End: 2024-06-10
Payer: COMMERCIAL

## 2024-06-13 ENCOUNTER — HOSPITAL ENCOUNTER (OUTPATIENT)
Dept: RADIOLOGY | Facility: HOSPITAL | Age: 74
Discharge: HOME | End: 2024-06-13
Attending: FAMILY MEDICINE
Payer: COMMERCIAL

## 2024-06-13 ENCOUNTER — OFFICE VISIT (OUTPATIENT)
Dept: INTERNAL MEDICINE | Facility: CLINIC | Age: 74
End: 2024-06-13
Payer: COMMERCIAL

## 2024-06-13 DIAGNOSIS — R50.9 FEVER, UNSPECIFIED FEVER CAUSE: Primary | ICD-10-CM

## 2024-06-13 DIAGNOSIS — R50.9 FEVER, UNSPECIFIED FEVER CAUSE: ICD-10-CM

## 2024-06-13 PROCEDURE — 99213 OFFICE O/P EST LOW 20 MIN: CPT | Mod: 95,,, | Performed by: FAMILY MEDICINE

## 2024-06-13 PROCEDURE — 71046 X-RAY EXAM CHEST 2 VIEWS: CPT | Mod: 26,,, | Performed by: RADIOLOGY

## 2024-06-13 PROCEDURE — 71046 X-RAY EXAM CHEST 2 VIEWS: CPT | Mod: TC

## 2024-06-13 NOTE — PROGRESS NOTES
Subjective:     Patient ID: Luly Lora is a 73 y.o. female.   Chief Complaint: No chief complaint on file.    HPI:  The patient location is: [place]  The chief complaint leading to consultation is: Fever    Visit type: audiovisual    Face to Face time with patient: 10 minutes  15 minutes of total time spent on the encounter, which includes face to face time and non-face to face time preparing to see the patient (eg, review of tests), Obtaining and/or reviewing separately obtained history, Documenting clinical information in the electronic or other health record, Independently interpreting results (not separately reported) and communicating results to the patient/family/caregiver, or Care coordination (not separately reported).         Each patient to whom he or she provides medical services by telemedicine is:  (1) informed of the relationship between the physician and patient and the respective role of any other health care provider with respect to management of the patient; and (2) notified that he or she may decline to receive medical services by telemedicine and may withdraw from such care at any time.    Notes:      Patient of Dr. Elma Copeland, seen acutely today for fever.    Reports she has had a fever for 6 days.  Reports her temperature tends to be normal (37 C/98.6 F) in the mornings, but every night she spikes to 101-102 F. denies GI symptoms.  Denies dysuria.  Reports she has a little bit of a cough.  Reports she has generalized weakness with myalgias.  She also has arthralgias.  No known sick contacts.  She has been hesitant to take antipyretics, because she is worried about interactions with Xarelto.    Review of Systems   Constitutional:  Positive for fever.   HENT:  Negative for nasal congestion.    Respiratory:  Positive for cough.    Cardiovascular:  Negative for chest pain.   Gastrointestinal:  Negative for abdominal pain.   Genitourinary:  Negative for dysuria.   Musculoskeletal:  Positive for  arthralgias and myalgias.          Objective:      There were no vitals filed for this visit.   Physical Exam  Constitutional:       General: She is not in acute distress.     Appearance: Normal appearance. She is not ill-appearing.   Pulmonary:      Effort: Pulmonary effort is normal.   Neurological:      General: No focal deficit present.      Mental Status: She is alert and oriented to person, place, and time. Mental status is at baseline.      Cranial Nerves: No dysarthria.   Psychiatric:         Mood and Affect: Mood normal.         Behavior: Behavior normal.         Thought Content: Thought content normal.         Judgment: Judgment normal.     As this visit was accomplished virtually, physical exam is limited only to what may be observed via the telehealth audiovisual connection.        Assessment:       Problem List Items Addressed This Visit    None  Visit Diagnoses       Fever, unspecified fever cause    -  Primary    Relevant Orders    Comprehensive Metabolic Panel (Completed)    CBC Auto Differential    Urinalysis, Reflex to Urine Culture Urine, Clean Catch    QUANTIFERON GOLD TB    Sedimentation rate    C-REACTIVE PROTEIN (Completed)    CK (Completed)    POCT Influenza A/B Rapid Antigen    POCT COVID-19 Rapid Screening    POCT Rapid Strep A    X-Ray Chest PA And Lateral              Plan:       1. Fever, unspecified fever cause  Unknown etiology of fever   Examination for potential causes extremely limited due to the nature of a virtual visit  Patient acknowledged this, is amenable to obtaining studies   Blood and urine tests have been ordered   Swabs have been ordered to rule out flu, strep, COVID   Ordering chest x-ray  Follow-up results, further POC pending  Advised patient Tylenol is okay to use for fever symptoms  - Comprehensive Metabolic Panel; Future  - CBC Auto Differential; Future  - Urinalysis, Reflex to Urine Culture Urine, Clean Catch; Future  - QUANTIFERON GOLD TB; Future  - Sedimentation  rate; Future  - C-REACTIVE PROTEIN; Future  - CK; Future  - POCT Influenza A/B Rapid Antigen; Future  - POCT COVID-19 Rapid Screening  - POCT Rapid Strep A  - X-Ray Chest PA And Lateral; Future          No follow-ups on file.     Percy Hopson MD, FAAFP  Family Medicine Physician  [facility]  06/13/2024      This note was produced using voice recognition technology. Some typographical or syntax errors may be present, despite best efforts with proofreading.

## 2024-06-14 ENCOUNTER — PATIENT MESSAGE (OUTPATIENT)
Dept: INTERNAL MEDICINE | Facility: CLINIC | Age: 74
End: 2024-06-14
Payer: COMMERCIAL

## 2024-06-14 ENCOUNTER — TELEPHONE (OUTPATIENT)
Dept: INTERNAL MEDICINE | Facility: CLINIC | Age: 74
End: 2024-06-14
Payer: COMMERCIAL

## 2024-06-14 DIAGNOSIS — R76.12 POSITIVE QUANTIFERON-TB GOLD TEST: Primary | ICD-10-CM

## 2024-06-14 DIAGNOSIS — J18.9 PNEUMONIA OF RIGHT UPPER LOBE DUE TO INFECTIOUS ORGANISM: Primary | ICD-10-CM

## 2024-06-14 RX ORDER — AZITHROMYCIN 250 MG/1
TABLET, FILM COATED ORAL
Qty: 6 TABLET | Refills: 0 | Status: SHIPPED | OUTPATIENT
Start: 2024-06-14 | End: 2024-06-14

## 2024-06-14 RX ORDER — AMOXICILLIN AND CLAVULANATE POTASSIUM 875; 125 MG/1; MG/1
1 TABLET, FILM COATED ORAL 2 TIMES DAILY
Qty: 20 TABLET | Refills: 0 | Status: SHIPPED | OUTPATIENT
Start: 2024-06-14 | End: 2024-06-14

## 2024-06-14 NOTE — TELEPHONE ENCOUNTER
----- Message from Sara Aquino MA sent at 6/14/2024  4:06 PM CDT -----  Regarding: FW: Medications for pnemonia  Contact: [phone]  Hey this patient was seen yesterday virtually and is inquiring about medication. Please advise  ----- Message -----  From: Adam Pacheco  Sent: 6/14/2024   3:46 PM CDT  To: [facility] Staff  Subject: Medications for pnemonia                         1MEDICALADVICE     Patient is calling for Medical Advice regarding: Pt said dr. Copeland prescribed meds for pt's phenomonia and she said they aren't in still. Please call pt back and confirm prescription.     How long has patient had these symptoms:    Pharmacy name and phone#:    Would like response via [facility]:  call  Comments:

## 2024-06-17 ENCOUNTER — OFFICE VISIT (OUTPATIENT)
Dept: INFECTIOUS DISEASES | Facility: CLINIC | Age: 74
End: 2024-06-17
Payer: COMMERCIAL

## 2024-06-17 VITALS
HEIGHT: 61 IN | DIASTOLIC BLOOD PRESSURE: 62 MMHG | TEMPERATURE: 98 F | WEIGHT: 103.38 LBS | HEART RATE: 61 BPM | BODY MASS INDEX: 19.52 KG/M2 | SYSTOLIC BLOOD PRESSURE: 109 MMHG

## 2024-06-17 DIAGNOSIS — J18.9 PNEUMONIA OF RIGHT UPPER LOBE DUE TO INFECTIOUS ORGANISM: Primary | ICD-10-CM

## 2024-06-17 DIAGNOSIS — R76.12 POSITIVE QUANTIFERON-TB GOLD TEST: ICD-10-CM

## 2024-06-17 PROCEDURE — 1160F RVW MEDS BY RX/DR IN RCRD: CPT | Mod: CPTII,S$GLB,, | Performed by: INTERNAL MEDICINE

## 2024-06-17 PROCEDURE — 1159F MED LIST DOCD IN RCRD: CPT | Mod: CPTII,S$GLB,, | Performed by: INTERNAL MEDICINE

## 2024-06-17 PROCEDURE — 99999 PR PBB SHADOW E&M-EST. PATIENT-LVL IV: CPT | Mod: PBBFAC,,, | Performed by: INTERNAL MEDICINE

## 2024-06-17 PROCEDURE — 3074F SYST BP LT 130 MM HG: CPT | Mod: CPTII,S$GLB,, | Performed by: INTERNAL MEDICINE

## 2024-06-17 PROCEDURE — 3008F BODY MASS INDEX DOCD: CPT | Mod: CPTII,S$GLB,, | Performed by: INTERNAL MEDICINE

## 2024-06-17 PROCEDURE — 1125F AMNT PAIN NOTED PAIN PRSNT: CPT | Mod: CPTII,S$GLB,, | Performed by: INTERNAL MEDICINE

## 2024-06-17 PROCEDURE — 3078F DIAST BP <80 MM HG: CPT | Mod: CPTII,S$GLB,, | Performed by: INTERNAL MEDICINE

## 2024-06-17 PROCEDURE — 99205 OFFICE O/P NEW HI 60 MIN: CPT | Mod: S$GLB,,, | Performed by: INTERNAL MEDICINE

## 2024-06-17 PROCEDURE — 1101F PT FALLS ASSESS-DOCD LE1/YR: CPT | Mod: CPTII,S$GLB,, | Performed by: INTERNAL MEDICINE

## 2024-06-17 PROCEDURE — 3288F FALL RISK ASSESSMENT DOCD: CPT | Mod: CPTII,S$GLB,, | Performed by: INTERNAL MEDICINE

## 2024-06-17 RX ORDER — CEFPODOXIME PROXETIL 200 MG/1
200 TABLET, FILM COATED ORAL 2 TIMES DAILY
Qty: 10 TABLET | Refills: 0 | Status: SHIPPED | OUTPATIENT
Start: 2024-06-17 | End: 2024-06-22

## 2024-06-17 NOTE — PROGRESS NOTES
Subjective     Patient ID: Luly Lora is a 73 y.o. female.    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  At that time, she had a CT scan done to evaluate abdominal pain that revealed bronchiectasis.  She had no respiratory symptoms at that time and her PFTs were apparently normal.  A plan was made to do a T-spot.  She eventually had this done in 2020 and it was negative.  About 1 week prior to this clinic visit, she started having fever.  Highest temp was 38.7 C.  Didn't have fever last night.  Has sweats at night time when she has fever.  No shortness of breath.  She has a little bit of cough.  Initially no pain but now she has a discomfort on the right chest when she coughs.  Her weight has been stable.  She was initially started on an antibiotic but this was stopped.  Chest x-ray showed right upper lobe pneumonia and patchy opacities and volume loss in the middle lobe and lingula.  A quantiferon gold test was ordered and it returned positive.  She is referred to me for further evaluation.    Medical History  BCG vaccination during childhood.  History of positive TB test.  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  She was born in [place].  She was 38 years old when she immigrated to the [place].  Spent 2 years on [place] working at [employer]. Then worked for [employer] in [place] for 3 years.  Moved to [place] in 1993.  Never smoked tobacco.  Quit ETOH for 1 year.  Never used illicit drugs.      Review of Systems   Constitutional: Positive for fever and night sweats. Negative for chills, decreased appetite, malaise/fatigue, weight gain and weight loss.   HENT:  Negative for  congestion, ear pain, hearing loss, hoarse voice, sore throat and tinnitus.    Eyes:  Negative for blurred vision, redness and visual disturbance.   Cardiovascular:  Negative for chest pain, leg swelling and palpitations.   Respiratory:  Positive for cough. Negative for hemoptysis, shortness of breath, sputum production and wheezing.    Hematologic/Lymphatic: Negative for adenopathy. Does not bruise/bleed easily.   Skin:  Negative for dry skin, itching, rash and suspicious lesions.   Musculoskeletal:  Negative for back pain, joint pain, myalgias and neck pain.   Gastrointestinal:  Negative for abdominal pain, constipation, diarrhea, heartburn, nausea and vomiting.   Genitourinary:  Negative for dysuria, flank pain, frequency, hematuria, hesitancy and urgency.   Neurological:  Positive for weakness. Negative for dizziness, headaches, numbness and paresthesias.   Psychiatric/Behavioral:  Negative for depression and memory loss. The patient does not have insomnia and is not nervous/anxious.       Objective   Physical Exam  Vitals and nursing note reviewed.   Constitutional:       General: She is not in acute distress.     Appearance: She is well-developed. She is not diaphoretic.   HENT:      Head: Normocephalic and atraumatic.      Right Ear: External ear normal.      Left Ear: External ear normal.      Nose: Nose normal.      Mouth/Throat:      Pharynx: No oropharyngeal exudate.   Eyes:      General: No scleral icterus.        Right eye: No discharge.         Left eye: No discharge.      Conjunctiva/sclera: Conjunctivae normal.      Pupils: Pupils are equal, round, and reactive to light.   Neck:      Thyroid: No thyromegaly.      Vascular: No JVD.      Trachea: No tracheal deviation.   Cardiovascular:      Rate and Rhythm: Normal rate and regular rhythm.      Heart sounds: No murmur heard.     No friction rub. No gallop.   Pulmonary:      Effort: Pulmonary effort is normal. No respiratory distress.      Breath  sounds: Normal breath sounds. No stridor. No wheezing or rales.   Chest:      Chest wall: No tenderness.   Abdominal:      General: Bowel sounds are normal. There is no distension.      Palpations: Abdomen is soft. There is no mass.      Tenderness: There is no abdominal tenderness. There is no guarding or rebound.   Musculoskeletal:         General: No tenderness. Normal range of motion.      Cervical back: Normal range of motion and neck supple.   Lymphadenopathy:      Cervical: No cervical adenopathy.   Skin:     General: Skin is warm.      Coloration: Skin is not pale.      Findings: No erythema or rash.   Neurological:      Mental Status: She is alert and oriented to person, place, and time.      Cranial Nerves: No cranial nerve deficit.      Motor: No abnormal muscle tone.      Coordination: Coordination normal.      Deep Tendon Reflexes: Reflexes normal.   Psychiatric:         Behavior: Behavior normal.         Thought Content: Thought content normal.         Judgment: Judgment normal.            Assessment and Plan     1. Pneumonia of right upper lobe due to infectious organism    2. Positive QuantiFERON-TB Gold test      73 year old female with a reported history of BCG vaccination as a child who is referred to me for right upper lobe pneumonia and positive quantiferon gold test.  Of note, a T-spot test was negative in 2020.  Tuberculosis is in the differential given the imaging findings and lab evidence of past TB exposure.  In general, the BCG vaccine if given in childhood is unlikely to affect the quantiferon gold test.  Also in the differential is community acquired pneumonia.  Patient is very upset about the possibility of tuberculosis.  Will give a trial of antibiotics for community acquired pneumonia to see if she responds clinically.  Will send respiratory sample for AFB smear and culture X3 with one sample sent for TB pcr.  She is to partially isolate at home until we have 3 negative AFB smears.   Will check in with the patient next week after all AFB smears have been submitted.    Plan:  Pneumonia of right upper lobe due to infectious organism  -     cefpodoxime (VANTIN) 200 MG tablet; Take 1 tablet (200 mg total) by mouth 2 (two) times daily. for 5 days  Dispense: 10 tablet; Refill: 0    Positive QuantiFERON-TB Gold test  -     Ambulatory referral/consult to Infectious Disease  -     AFB Culture & Smear; Standing  -     M. tuberculosis DNA by PCR [facility]; Sputum, Expectorated; Future; Expected date: 06/17/2024

## 2024-06-18 ENCOUNTER — LAB VISIT (OUTPATIENT)
Dept: LAB | Facility: OTHER | Age: 74
End: 2024-06-18
Attending: INTERNAL MEDICINE
Payer: COMMERCIAL

## 2024-06-18 DIAGNOSIS — R76.12 POSITIVE QUANTIFERON-TB GOLD TEST: ICD-10-CM

## 2024-06-18 PROCEDURE — 87118 MYCOBACTERIC IDENTIFICATION: CPT | Performed by: INTERNAL MEDICINE

## 2024-06-18 PROCEDURE — 87556 M.TUBERCULO DNA AMP PROBE: CPT | Performed by: INTERNAL MEDICINE

## 2024-06-18 PROCEDURE — 87015 SPECIMEN INFECT AGNT CONCNTJ: CPT | Performed by: INTERNAL MEDICINE

## 2024-06-18 PROCEDURE — 87206 SMEAR FLUORESCENT/ACID STAI: CPT | Performed by: INTERNAL MEDICINE

## 2024-06-18 PROCEDURE — 87116 MYCOBACTERIA CULTURE: CPT | Performed by: INTERNAL MEDICINE

## 2024-06-19 DIAGNOSIS — Z78.0 MENOPAUSE: ICD-10-CM

## 2024-06-19 LAB
ACID FAST MOD KINY STN SPEC: NORMAL
MYCOBACTERIUM SPEC QL CULT: NORMAL

## 2024-06-20 ENCOUNTER — LAB VISIT (OUTPATIENT)
Dept: LAB | Facility: HOSPITAL | Age: 74
End: 2024-06-20
Attending: INTERNAL MEDICINE
Payer: COMMERCIAL

## 2024-06-20 DIAGNOSIS — R76.12 POSITIVE QUANTIFERON-TB GOLD TEST: ICD-10-CM

## 2024-06-20 PROCEDURE — 87206 SMEAR FLUORESCENT/ACID STAI: CPT | Performed by: INTERNAL MEDICINE

## 2024-06-20 PROCEDURE — 87116 MYCOBACTERIA CULTURE: CPT | Performed by: INTERNAL MEDICINE

## 2024-06-20 PROCEDURE — 87118 MYCOBACTERIC IDENTIFICATION: CPT | Performed by: INTERNAL MEDICINE

## 2024-06-20 PROCEDURE — 87015 SPECIMEN INFECT AGNT CONCNTJ: CPT | Performed by: INTERNAL MEDICINE

## 2024-06-21 LAB
M TB CMPLX DNA SPEC QL NAA+PROBE: NEGATIVE
SPECIMEN SOURCE: NORMAL

## 2024-06-25 ENCOUNTER — PATIENT MESSAGE (OUTPATIENT)
Dept: INFECTIOUS DISEASES | Facility: CLINIC | Age: 74
End: 2024-06-25
Payer: COMMERCIAL

## 2024-06-26 ENCOUNTER — LAB VISIT (OUTPATIENT)
Dept: LAB | Facility: HOSPITAL | Age: 74
End: 2024-06-26
Attending: INTERNAL MEDICINE
Payer: COMMERCIAL

## 2024-06-26 DIAGNOSIS — R76.12 POSITIVE QUANTIFERON-TB GOLD TEST: ICD-10-CM

## 2024-06-26 PROCEDURE — 87206 SMEAR FLUORESCENT/ACID STAI: CPT | Performed by: INTERNAL MEDICINE

## 2024-06-26 PROCEDURE — 87118 MYCOBACTERIC IDENTIFICATION: CPT | Mod: 59 | Performed by: INTERNAL MEDICINE

## 2024-06-26 PROCEDURE — 87116 MYCOBACTERIA CULTURE: CPT | Performed by: INTERNAL MEDICINE

## 2024-06-26 PROCEDURE — 87015 SPECIMEN INFECT AGNT CONCNTJ: CPT | Performed by: INTERNAL MEDICINE

## 2024-06-27 ENCOUNTER — TELEPHONE (OUTPATIENT)
Dept: INFECTIOUS DISEASES | Facility: CLINIC | Age: 74
End: 2024-06-27
Payer: COMMERCIAL

## 2024-06-27 LAB
ACID FAST MOD KINY STN SPEC: NORMAL
ACID FAST MOD KINY STN SPEC: NORMAL
MYCOBACTERIUM SPEC QL CULT: NORMAL

## 2024-06-28 ENCOUNTER — TELEPHONE (OUTPATIENT)
Dept: ELECTROPHYSIOLOGY | Facility: CLINIC | Age: 74
End: 2024-06-28
Payer: COMMERCIAL

## 2024-06-28 DIAGNOSIS — I48.0 PAF (PAROXYSMAL ATRIAL FIBRILLATION): Primary | ICD-10-CM

## 2024-06-28 RX ORDER — WARFARIN SODIUM 5 MG/1
5 TABLET ORAL DAILY
Qty: 90 TABLET | Refills: 3 | Status: SHIPPED | OUTPATIENT
Start: 2024-06-28 | End: 2025-06-28

## 2024-06-28 NOTE — TELEPHONE ENCOUNTER
Spoke with pt, informed her that she is being switched from xarelto to coumadin, will not take xarelto today and start her coumadin tomorrow, referral for coumadin clinic placed and message sent to coumadin clinic, INR scheduled for Monday, informed pt that coumadin clinic will contact her to go over all the instructions for taking coumadin and the monitoring

## 2024-06-28 NOTE — TELEPHONE ENCOUNTER
Spoke to patient over the phone.  She is now AFB culture positive X2.    Plan  Arranging follow up with [facility]  She will have an appointment with them on Monday and treatment will be started then.  Notified Dr. Mcknight of possible drug/drug interactions with her current anticoagulation.

## 2024-06-28 NOTE — TELEPHONE ENCOUNTER
----- Message from Humphrey Hood MD sent at 6/27/2024  9:18 PM CDT -----  Hello,    That would be okay with me.  Just keep in mind that they will interact with coumadin as well so the dose will have to be adjusted.  Thank you very much.    Dr. Hood  ----- Message -----  From: Jesus Mcknight MD  Sent: 6/27/2024  10:38 AM CDT  To: Humphrey Hood MD; Tri Jordan RN    Hi   Sorry to hear about the TB, but happy that she's getting therapy from you.  None of the DOACs will work out, unfortunately (they all interact with those TB meds). She'll have to transition to coumadin for the 6 months of treatment, as long as that's OK with you.  Tri, if that's OK with Dr Hood, please transition.  thanks  Davey  ----- Message -----  From: Humphrey Hood MD  Sent: 6/27/2024  10:28 AM CDT  To: MD Maggy Garcia,    This patient of yours is on Xarelto.  She has possible tuberculosis and will need to be started on tuberculosis therapy.  She will need to be on either rifampin or rifabutin as part of her tuberculosis regimen.  Rifabutin has less drug interactions.      I wanted to ask you if there was anything else she could take.  I anticipate she will need to be on therapy for 6 months.    Dr. Hood

## 2024-07-01 ENCOUNTER — ANTI-COAG VISIT (OUTPATIENT)
Dept: CARDIOLOGY | Facility: CLINIC | Age: 74
End: 2024-07-01
Payer: COMMERCIAL

## 2024-07-01 ENCOUNTER — PATIENT MESSAGE (OUTPATIENT)
Dept: ELECTROPHYSIOLOGY | Facility: CLINIC | Age: 74
End: 2024-07-01
Payer: COMMERCIAL

## 2024-07-01 ENCOUNTER — PATIENT MESSAGE (OUTPATIENT)
Dept: CARDIOLOGY | Facility: CLINIC | Age: 74
End: 2024-07-01
Payer: COMMERCIAL

## 2024-07-01 DIAGNOSIS — Z79.01 LONG TERM (CURRENT) USE OF ANTICOAGULANTS: Primary | ICD-10-CM

## 2024-07-01 DIAGNOSIS — I48.0 PAF (PAROXYSMAL ATRIAL FIBRILLATION): ICD-10-CM

## 2024-07-01 RX ORDER — WARFARIN SODIUM 5 MG/1
5 TABLET ORAL DAILY
Qty: 30 TABLET | Refills: 5 | Status: SHIPPED | OUTPATIENT
Start: 2024-07-01 | End: 2025-07-01

## 2024-07-03 LAB
ACID FAST MOD KINY STN SPEC: NORMAL
MYCOBACTERIUM SPEC QL CULT: NORMAL
MYCOBACTERIUM SPEC QL CULT: NORMAL

## 2024-07-05 ENCOUNTER — LAB VISIT (OUTPATIENT)
Dept: LAB | Facility: HOSPITAL | Age: 74
End: 2024-07-05
Attending: INTERNAL MEDICINE
Payer: COMMERCIAL

## 2024-07-05 ENCOUNTER — ANTI-COAG VISIT (OUTPATIENT)
Dept: CARDIOLOGY | Facility: CLINIC | Age: 74
End: 2024-07-05
Payer: COMMERCIAL

## 2024-07-05 DIAGNOSIS — Z79.01 LONG TERM (CURRENT) USE OF ANTICOAGULANTS: ICD-10-CM

## 2024-07-05 DIAGNOSIS — I48.0 PAF (PAROXYSMAL ATRIAL FIBRILLATION): ICD-10-CM

## 2024-07-05 DIAGNOSIS — I48.0 PAF (PAROXYSMAL ATRIAL FIBRILLATION): Primary | ICD-10-CM

## 2024-07-05 LAB
INR PPP: 4.2 (ref 0.8–1.2)
PROTHROMBIN TIME: 41.8 SEC (ref 9–12.5)

## 2024-07-05 PROCEDURE — 36415 COLL VENOUS BLD VENIPUNCTURE: CPT | Performed by: INTERNAL MEDICINE

## 2024-07-05 PROCEDURE — 85610 PROTHROMBIN TIME: CPT | Performed by: INTERNAL MEDICINE

## 2024-07-05 NOTE — PROGRESS NOTES
[facility]    2024 2:31 PM    Assessment/Plan:    Patient presents today with supratherapeutic INR.    Assessment of patient findings and chart review: new warfarin start; on rifampin    Recommendation for patient's warfarin regimen: Hold dose today then take 2.5mg Saturday and     Recommend repeat INR Monday  _________________________________________________________________    Luly Lora (73 y.o.) is followed by the [facility].    Anticoagulation Summary  As of 2024      INR goal:  2.0-3.0   TTR:  --   INR used for dosin.2 (2024)   Warfarin maintenance plan:  5 mg (5 mg x 1) every day   Weekly warfarin total:  35 mg   Plan last modified:  Noemi Braun, PharmD (2024)   Next INR check:  2024   Target end date:  1/15/2025    Indications    PAF (paroxysmal atrial fibrillation) [I48.0]  Long term (current) use of anticoagulants [Z79.01]                 Anticoagulation Episode Summary       INR check location:  Clinic Lab    Preferred lab:      Send INR reminders to:  [facility] COUMADIN MONITORING POOL    Comments:  [facility]          Anticoagulation Care Providers       Provider Role Specialty Phone number    Jesus Mcknight MD Responsible Electrophysiology [phone]

## 2024-07-08 ENCOUNTER — LAB VISIT (OUTPATIENT)
Dept: LAB | Facility: HOSPITAL | Age: 74
End: 2024-07-08
Attending: INTERNAL MEDICINE
Payer: COMMERCIAL

## 2024-07-08 ENCOUNTER — ANTI-COAG VISIT (OUTPATIENT)
Dept: CARDIOLOGY | Facility: CLINIC | Age: 74
End: 2024-07-08
Payer: COMMERCIAL

## 2024-07-08 DIAGNOSIS — Z79.01 LONG TERM (CURRENT) USE OF ANTICOAGULANTS: ICD-10-CM

## 2024-07-08 DIAGNOSIS — I48.0 PAF (PAROXYSMAL ATRIAL FIBRILLATION): ICD-10-CM

## 2024-07-08 DIAGNOSIS — I48.0 PAF (PAROXYSMAL ATRIAL FIBRILLATION): Primary | ICD-10-CM

## 2024-07-08 LAB
INR PPP: 1.7 (ref 0.8–1.2)
PROTHROMBIN TIME: 17.6 SEC (ref 9–12.5)

## 2024-07-08 PROCEDURE — 36415 COLL VENOUS BLD VENIPUNCTURE: CPT | Mod: PN | Performed by: INTERNAL MEDICINE

## 2024-07-08 PROCEDURE — 93793 ANTICOAG MGMT PT WARFARIN: CPT | Mod: S$GLB,,,

## 2024-07-08 PROCEDURE — 85610 PROTHROMBIN TIME: CPT | Performed by: INTERNAL MEDICINE

## 2024-07-08 NOTE — PROGRESS NOTES
[email or web address]    2024 4:24 PM    Assessment/Plan:    Patient presents today with subtherapeutic  INR.    Assessment of patient findings and chart review: new start warfarin; currently on RIPE therapy     Recommendation for patient's warfarin regimen:  Per calendar    Recommend repeat INR in 3 days  _________________________________________________________________    Luly Lora (73 y.o.) is followed by the [facility].    Anticoagulation Summary  As of 2024      INR goal:  2.0-3.0   TTR:  --   INR used for dosin.7 (2024)   Warfarin maintenance plan:  5 mg (5 mg x 1) every day   Weekly warfarin total:  35 mg   Plan last modified:  Noemi Braun, PharmD (2024)   Next INR check:  2024   Target end date:  1/15/2025    Indications    PAF (paroxysmal atrial fibrillation) [I48.0]  Long term (current) use of anticoagulants [Z79.01]                 Anticoagulation Episode Summary       INR check location:  Clinic Lab    Preferred lab:      Send INR reminders to:  [facility] COUMADIN MONITORING POOL    Comments:  [facility]          Anticoagulation Care Providers       Provider Role Specialty Phone number    Jesus Mcknight MD Responsible Electrophysiology [phone]

## 2024-07-11 ENCOUNTER — ANTI-COAG VISIT (OUTPATIENT)
Dept: CARDIOLOGY | Facility: CLINIC | Age: 74
End: 2024-07-11
Payer: COMMERCIAL

## 2024-07-11 ENCOUNTER — LAB VISIT (OUTPATIENT)
Dept: LAB | Facility: HOSPITAL | Age: 74
End: 2024-07-11
Attending: INTERNAL MEDICINE
Payer: COMMERCIAL

## 2024-07-11 DIAGNOSIS — Z79.01 LONG TERM (CURRENT) USE OF ANTICOAGULANTS: ICD-10-CM

## 2024-07-11 DIAGNOSIS — I48.0 PAF (PAROXYSMAL ATRIAL FIBRILLATION): Primary | ICD-10-CM

## 2024-07-11 DIAGNOSIS — I48.0 PAF (PAROXYSMAL ATRIAL FIBRILLATION): ICD-10-CM

## 2024-07-11 LAB
INR PPP: 2.1 (ref 0.8–1.2)
PROTHROMBIN TIME: 21.9 SEC (ref 9–12.5)

## 2024-07-11 PROCEDURE — 85610 PROTHROMBIN TIME: CPT | Performed by: INTERNAL MEDICINE

## 2024-07-11 PROCEDURE — 93793 ANTICOAG MGMT PT WARFARIN: CPT | Mod: S$GLB,,,

## 2024-07-11 PROCEDURE — 36415 COLL VENOUS BLD VENIPUNCTURE: CPT | Mod: PN | Performed by: INTERNAL MEDICINE

## 2024-07-11 NOTE — PROGRESS NOTES
[facility]    2024 4:00 PM    Assessment/Plan:    Patient presents today with therapeutic INR.    Assessment of patient findings and chart review: INR now at goal    Recommendation for patient's warfarin regimen:  adjust weekly plan based on recent dose adjustments    Recommend repeat INR in 5 days  _________________________________________________________________    Luly Lora (73 y.o.) is followed by the [facility].    Anticoagulation Summary  As of 2024      INR goal:  2.0-3.0   TTR:  100.0% (3 d)   INR used for dosin.1 (2024)   Warfarin maintenance plan:  2.5 mg (5 mg x 0.5) every Mon, Wed, Fri; 5 mg (5 mg x 1) all other days   Weekly warfarin total:  27.5 mg   Plan last modified:  Noemi Braun, PharmD (2024)   Next INR check:  7/15/2024   Target end date:  1/15/2025    Indications    PAF (paroxysmal atrial fibrillation) [I48.0]  Long term (current) use of anticoagulants [Z79.01]                 Anticoagulation Episode Summary       INR check location:  Clinic Lab    Preferred lab:      Send INR reminders to:  [facility] COUMADIN MONITORING POOL    Comments:  [facility]          Anticoagulation Care Providers       Provider Role Specialty Phone number    Jesus Mcknight MD Responsible Electrophysiology [phone]

## 2024-07-15 ENCOUNTER — LAB VISIT (OUTPATIENT)
Dept: LAB | Facility: HOSPITAL | Age: 74
End: 2024-07-15
Attending: INTERNAL MEDICINE
Payer: COMMERCIAL

## 2024-07-15 DIAGNOSIS — I48.0 PAF (PAROXYSMAL ATRIAL FIBRILLATION): ICD-10-CM

## 2024-07-15 DIAGNOSIS — Z79.01 LONG TERM (CURRENT) USE OF ANTICOAGULANTS: ICD-10-CM

## 2024-07-15 LAB
INR PPP: 2.2 (ref 0.8–1.2)
PROTHROMBIN TIME: 22.8 SEC (ref 9–12.5)

## 2024-07-15 PROCEDURE — 85610 PROTHROMBIN TIME: CPT | Performed by: INTERNAL MEDICINE

## 2024-07-15 PROCEDURE — 36415 COLL VENOUS BLD VENIPUNCTURE: CPT | Mod: PN | Performed by: INTERNAL MEDICINE

## 2024-07-16 ENCOUNTER — DOCUMENTATION ONLY (OUTPATIENT)
Dept: PULMONOLOGY | Facility: CLINIC | Age: 74
End: 2024-07-16
Payer: COMMERCIAL

## 2024-07-16 ENCOUNTER — ANTI-COAG VISIT (OUTPATIENT)
Dept: CARDIOLOGY | Facility: CLINIC | Age: 74
End: 2024-07-16
Payer: COMMERCIAL

## 2024-07-16 DIAGNOSIS — J47.9 ADULT BRONCHIECTASIS: ICD-10-CM

## 2024-07-16 DIAGNOSIS — R05.3 CHRONIC COUGH: Primary | ICD-10-CM

## 2024-07-16 DIAGNOSIS — I48.0 PAF (PAROXYSMAL ATRIAL FIBRILLATION): Primary | ICD-10-CM

## 2024-07-16 DIAGNOSIS — Z79.01 LONG TERM (CURRENT) USE OF ANTICOAGULANTS: ICD-10-CM

## 2024-07-16 PROCEDURE — 93793 ANTICOAG MGMT PT WARFARIN: CPT | Mod: S$GLB,,,

## 2024-07-16 NOTE — PROGRESS NOTES
[facility] NOTE      Ms Lora was seen for follow up. She was previously started on RIPE therapy but this was stopped 2/2 N/V and rash with elevated transaminases. She also reported some confusion regarding her regimen. Last dose was 7/6. These symptoms has resolved but she is now having recurrence of her fever, fatigue, NS.   There is some questions as to whether he disease is TB vs NTM. She also has a h/o bronchiectasis.     Plan:  --obtaining sputum AVB & Clx  --CT chest ordered  --withhold antibiotics at this time     Pt seen in conjunction with Dr. Shannon Tariq.     Davey Lund MD  [employer] PCCM Fellow

## 2024-07-16 NOTE — PROGRESS NOTES
[facility]    2024 9:06 AM    Assessment/Plan:    Patient presents today with therapeutic INR.    Assessment of patient findings and chart review: new patient; no pertinent changes per chart review    Recommendation for patient's warfarin regimen: Continue current maintenance dose    Recommend repeat INR in 2 days  _________________________________________________________________    Luly Lora (73 y.o.) is followed by the [facility].    Anticoagulation Summary  As of 2024      INR goal:  2.0-3.0   TTR:  100.0% (1 wk)   INR used for dosin.2 (7/15/2024)   Warfarin maintenance plan:  2.5 mg (5 mg x 0.5) every Mon, Wed, Fri; 5 mg (5 mg x 1) all other days   Weekly warfarin total:  27.5 mg   Plan last modified:  Noemi Braun, PharmD (2024)   Next INR check:  2024   Target end date:  1/15/2025    Indications    PAF (paroxysmal atrial fibrillation) [I48.0]  Long term (current) use of anticoagulants [Z79.01]                 Anticoagulation Episode Summary       INR check location:  Clinic Lab    Preferred lab:      Send INR reminders to:  [facility] COUMADIN MONITORING POOL    Comments:  [facility]          Anticoagulation Care Providers       Provider Role Specialty Phone number    Jesus Mcknight MD [facility] Electrophysiology [phone]

## 2024-07-16 NOTE — PROGRESS NOTES
[facility]      Ms Lora was seen for follow up. She was previously started on RIPE therapy but this was stopped 2/2 N/V and rash with elevated transaminases. She also reported some confusion regarding her regimen. Last dose was 7/6. These symptoms has resolved but she is now having recurrence of her fever, fatigue, NS.   There is some questions as to whether he disease is TB vs NTM. She also has a h/o bronchiectasis.      Plan:  --obtaining sputum AVB & Clx  --CT chest ordered  --withhold antibiotics at this time      Pt seen in conjunction with Dr. Shannon Tariq.      Davey Lund MD  [employer] PCCM Fellow

## 2024-07-18 ENCOUNTER — LAB VISIT (OUTPATIENT)
Dept: LAB | Facility: HOSPITAL | Age: 74
End: 2024-07-18
Attending: INTERNAL MEDICINE
Payer: COMMERCIAL

## 2024-07-18 ENCOUNTER — ANTI-COAG VISIT (OUTPATIENT)
Dept: CARDIOLOGY | Facility: CLINIC | Age: 74
End: 2024-07-18
Payer: COMMERCIAL

## 2024-07-18 DIAGNOSIS — Z79.01 LONG TERM (CURRENT) USE OF ANTICOAGULANTS: ICD-10-CM

## 2024-07-18 DIAGNOSIS — I48.0 PAF (PAROXYSMAL ATRIAL FIBRILLATION): ICD-10-CM

## 2024-07-18 DIAGNOSIS — I48.0 PAF (PAROXYSMAL ATRIAL FIBRILLATION): Primary | ICD-10-CM

## 2024-07-18 LAB
INR PPP: 7.7 (ref 0.8–1.2)
PROTHROMBIN TIME: 74.5 SEC (ref 9–12.5)

## 2024-07-18 PROCEDURE — 85610 PROTHROMBIN TIME: CPT | Performed by: INTERNAL MEDICINE

## 2024-07-18 PROCEDURE — 36415 COLL VENOUS BLD VENIPUNCTURE: CPT | Mod: PN | Performed by: INTERNAL MEDICINE

## 2024-07-18 PROCEDURE — 93793 ANTICOAG MGMT PT WARFARIN: CPT | Mod: S$GLB,,,

## 2024-07-18 NOTE — PROGRESS NOTES
Patient questioned regarding elevated INR. Warfarin per calendar. Patient reports discontinuing medications for TB, on 7/11, due to severe side effects and elevated LFTs. Patient denied other changes and s/sx of bleeding. Patient advised to eat serving of greens, and retest, in the AM, on 7/19. Patient also advised to go to ED in the event of s/sx of bleeding.

## 2024-07-19 ENCOUNTER — ANTI-COAG VISIT (OUTPATIENT)
Dept: CARDIOLOGY | Facility: CLINIC | Age: 74
End: 2024-07-19
Payer: COMMERCIAL

## 2024-07-19 ENCOUNTER — LAB VISIT (OUTPATIENT)
Dept: LAB | Facility: HOSPITAL | Age: 74
End: 2024-07-19
Attending: INTERNAL MEDICINE
Payer: COMMERCIAL

## 2024-07-19 DIAGNOSIS — Z79.01 LONG TERM (CURRENT) USE OF ANTICOAGULANTS: ICD-10-CM

## 2024-07-19 DIAGNOSIS — I48.0 PAF (PAROXYSMAL ATRIAL FIBRILLATION): Primary | ICD-10-CM

## 2024-07-19 DIAGNOSIS — I48.0 PAF (PAROXYSMAL ATRIAL FIBRILLATION): ICD-10-CM

## 2024-07-19 LAB
INR PPP: 4.9 (ref 0.8–1.2)
PROTHROMBIN TIME: 48.8 SEC (ref 9–12.5)

## 2024-07-19 PROCEDURE — 36415 COLL VENOUS BLD VENIPUNCTURE: CPT | Performed by: INTERNAL MEDICINE

## 2024-07-19 PROCEDURE — 93793 ANTICOAG MGMT PT WARFARIN: CPT | Mod: S$GLB,,,

## 2024-07-19 PROCEDURE — 85610 PROTHROMBIN TIME: CPT | Performed by: INTERNAL MEDICINE

## 2024-07-19 NOTE — PROGRESS NOTES
[facility]    2024 11:07 AM    Assessment/Plan:    Patient presents today with supratherapeutic INR.    Assessment of patient findings and chart review: INR remains above goal but improved from yesterday. Will hold/lower warfarin dose further since TB regimen d/c. Will send a message to Dr. Mcknight about perhaps transitioning patient back to DOAC if TB meds not to be resumed.    Recommendation for patient's warfarin regimen:  hold and lower per calendar     Recommend repeat INR in 3 days  _________________________________________________________________    Luly Lora (73 y.o.) is followed by the [facility].    Anticoagulation Summary  As of 2024      INR goal:  2.0-3.0   TTR:  68.9% (1.6 wk)   INR used for dosin.9 (2024)   Warfarin maintenance plan:  2.5 mg (5 mg x 0.5) every Mon, Wed, Fri; 5 mg (5 mg x 1) all other days   Weekly warfarin total:  27.5 mg   Plan last modified:  Noemi Braun, PharmD (2024)   Next INR check:  2024   Target end date:  1/15/2025    Indications    PAF (paroxysmal atrial fibrillation) [I48.0]  Long term (current) use of anticoagulants [Z79.01]                 Anticoagulation Episode Summary       INR check location:  Clinic Lab    Preferred lab:      Send INR reminders to:  [facility] COUMADIN MONITORING POOL    Comments:  [facility]          Anticoagulation Care Providers       Provider Role Specialty Phone number    Jesus Mcknight MD Responsible Electrophysiology [phone]

## 2024-07-20 ENCOUNTER — HOSPITAL ENCOUNTER (OUTPATIENT)
Dept: RADIOLOGY | Facility: OTHER | Age: 74
Discharge: HOME | End: 2024-07-20
Attending: STUDENT IN AN ORGANIZED HEALTH CARE EDUCATION/TRAINING PROGRAM
Payer: COMMERCIAL

## 2024-07-20 DIAGNOSIS — J47.9 ADULT BRONCHIECTASIS: ICD-10-CM

## 2024-07-20 DIAGNOSIS — R05.3 CHRONIC COUGH: ICD-10-CM

## 2024-07-20 PROCEDURE — 71250 CT THORAX DX C-: CPT | Mod: 26,,, | Performed by: RADIOLOGY

## 2024-07-20 PROCEDURE — 71250 CT THORAX DX C-: CPT | Mod: TC

## 2024-07-22 ENCOUNTER — ANTI-COAG VISIT (OUTPATIENT)
Dept: CARDIOLOGY | Facility: CLINIC | Age: 74
End: 2024-07-22
Payer: COMMERCIAL

## 2024-07-22 ENCOUNTER — LAB VISIT (OUTPATIENT)
Dept: LAB | Facility: HOSPITAL | Age: 74
End: 2024-07-22
Attending: INTERNAL MEDICINE
Payer: COMMERCIAL

## 2024-07-22 DIAGNOSIS — Z79.01 LONG TERM (CURRENT) USE OF ANTICOAGULANTS: ICD-10-CM

## 2024-07-22 DIAGNOSIS — I48.0 PAF (PAROXYSMAL ATRIAL FIBRILLATION): ICD-10-CM

## 2024-07-22 DIAGNOSIS — I48.0 PAF (PAROXYSMAL ATRIAL FIBRILLATION): Primary | ICD-10-CM

## 2024-07-22 LAB
INR PPP: 1.5 (ref 0.8–1.2)
PROTHROMBIN TIME: 15.7 SEC (ref 9–12.5)

## 2024-07-22 PROCEDURE — 36415 COLL VENOUS BLD VENIPUNCTURE: CPT | Performed by: INTERNAL MEDICINE

## 2024-07-22 PROCEDURE — 93793 ANTICOAG MGMT PT WARFARIN: CPT | Mod: S$GLB,,,

## 2024-07-22 PROCEDURE — 85610 PROTHROMBIN TIME: CPT | Performed by: INTERNAL MEDICINE

## 2024-07-22 NOTE — PROGRESS NOTES
[facility]    2024 2:43 PM    Assessment/Plan:    Patient presents today with subtherapeutic  INR.    Assessment of patient findings and chart review: reviewed; patient recently Dc'd TB tx. Patient reports to increased greens intake over weekend.     Recommendation for patient's warfarin regimen:  Doses per calendar    Recommend repeat INR in 2 days  _________________________________________________________________    Bhaskarmathew Geno (73 y.o.) is followed by the [facility].    Anticoagulation Summary  As of 2024      INR goal:  2.0-3.0   TTR:  60.6% (2 wk)   INR used for dosin.5 (2024)   Warfarin maintenance plan:  No maintenance plan   Plan last modified:  Noemi Braun, PharmD (2024)   Next INR check:  2024   Target end date:  1/15/2025    Indications    PAF (paroxysmal atrial fibrillation) [I48.0]  Long term (current) use of anticoagulants [Z79.01]                 Anticoagulation Episode Summary       INR check location:  Clinic Lab    Preferred lab:      Send INR reminders to:  [facility] COUMADIN MONITORING POOL    Comments:  [facility]          Anticoagulation Care Providers       Provider Role Specialty Phone number    Jesus Mcknight MD Responsible Electrophysiology [phone]

## 2024-07-23 DIAGNOSIS — I48.0 PAF (PAROXYSMAL ATRIAL FIBRILLATION): Primary | ICD-10-CM

## 2024-07-23 NOTE — PROGRESS NOTES
Per Dr. Mcknight, patient can transition back to Xarelto. Recommend continuing current plan and re-check INR 7/24. If INR is <3, patient may begin Xarelto and discontinue warfarin. Coumadin Clinic will aid in transitioning back to DOAC.

## 2024-07-24 ENCOUNTER — ANTI-COAG VISIT (OUTPATIENT)
Dept: CARDIOLOGY | Facility: CLINIC | Age: 74
End: 2024-07-24
Payer: COMMERCIAL

## 2024-07-24 ENCOUNTER — LAB VISIT (OUTPATIENT)
Dept: LAB | Facility: HOSPITAL | Age: 74
End: 2024-07-24
Attending: INTERNAL MEDICINE
Payer: COMMERCIAL

## 2024-07-24 DIAGNOSIS — Z79.01 LONG TERM (CURRENT) USE OF ANTICOAGULANTS: ICD-10-CM

## 2024-07-24 DIAGNOSIS — I48.0 PAF (PAROXYSMAL ATRIAL FIBRILLATION): Primary | ICD-10-CM

## 2024-07-24 DIAGNOSIS — I48.0 PAF (PAROXYSMAL ATRIAL FIBRILLATION): ICD-10-CM

## 2024-07-24 LAB
INR PPP: 1.9 (ref 0.8–1.2)
PROTHROMBIN TIME: 19.6 SEC (ref 9–12.5)

## 2024-07-24 PROCEDURE — 93793 ANTICOAG MGMT PT WARFARIN: CPT | Mod: S$GLB,,,

## 2024-07-24 PROCEDURE — 85610 PROTHROMBIN TIME: CPT | Performed by: INTERNAL MEDICINE

## 2024-07-24 PROCEDURE — 36415 COLL VENOUS BLD VENIPUNCTURE: CPT | Performed by: INTERNAL MEDICINE

## 2024-07-24 NOTE — PROGRESS NOTES
INR 1.9- patient is ok to stop warfarin at this time and begin Xarelto 20mg by mouth daily. Patient will be discharged from Coumadin Clinic.

## 2024-07-25 ENCOUNTER — OFFICE VISIT (OUTPATIENT)
Dept: UROGYNECOLOGY | Facility: CLINIC | Age: 74
End: 2024-07-25
Payer: COMMERCIAL

## 2024-07-25 VITALS
DIASTOLIC BLOOD PRESSURE: 58 MMHG | WEIGHT: 104.75 LBS | SYSTOLIC BLOOD PRESSURE: 95 MMHG | BODY MASS INDEX: 19.78 KG/M2 | HEIGHT: 61 IN | HEART RATE: 71 BPM

## 2024-07-25 DIAGNOSIS — N95.2 VAGINAL ATROPHY: ICD-10-CM

## 2024-07-25 DIAGNOSIS — Z46.89 PESSARY MAINTENANCE: ICD-10-CM

## 2024-07-25 DIAGNOSIS — N81.4 UTEROVAGINAL PROLAPSE: Primary | ICD-10-CM

## 2024-07-25 PROCEDURE — 3078F DIAST BP <80 MM HG: CPT | Mod: CPTII,S$GLB,, | Performed by: NURSE PRACTITIONER

## 2024-07-25 PROCEDURE — 3074F SYST BP LT 130 MM HG: CPT | Mod: CPTII,S$GLB,, | Performed by: NURSE PRACTITIONER

## 2024-07-25 PROCEDURE — 1160F RVW MEDS BY RX/DR IN RCRD: CPT | Mod: CPTII,S$GLB,, | Performed by: NURSE PRACTITIONER

## 2024-07-25 PROCEDURE — 1159F MED LIST DOCD IN RCRD: CPT | Mod: CPTII,S$GLB,, | Performed by: NURSE PRACTITIONER

## 2024-07-25 PROCEDURE — 99999 PR PBB SHADOW E&M-EST. PATIENT-LVL IV: CPT | Mod: PBBFAC,,, | Performed by: NURSE PRACTITIONER

## 2024-07-25 PROCEDURE — 99213 OFFICE O/P EST LOW 20 MIN: CPT | Mod: S$GLB,,, | Performed by: NURSE PRACTITIONER

## 2024-07-25 PROCEDURE — 3008F BODY MASS INDEX DOCD: CPT | Mod: CPTII,S$GLB,, | Performed by: NURSE PRACTITIONER

## 2024-07-25 PROCEDURE — 1126F AMNT PAIN NOTED NONE PRSNT: CPT | Mod: CPTII,S$GLB,, | Performed by: NURSE PRACTITIONER

## 2024-07-25 PROCEDURE — 1101F PT FALLS ASSESS-DOCD LE1/YR: CPT | Mod: CPTII,S$GLB,, | Performed by: NURSE PRACTITIONER

## 2024-07-25 PROCEDURE — 3288F FALL RISK ASSESSMENT DOCD: CPT | Mod: CPTII,S$GLB,, | Performed by: NURSE PRACTITIONER

## 2024-07-25 NOTE — PROGRESS NOTES
Urogyn follow up  07/25/2024  .  [facility]  [address] [id number]    Luly Lora  [id number]  1950      Luly Lora is a 73 y.o. here for a urogyn follow up for pessary check.    Last HPI from 01/23/2018     HPI:       Ohs Peq [id number]      Question 1/21/2018  8:07 PM CST   Do you...     Usually experience pressure in the lower abdomen? Symptoms not present   Usually experience heaviness or dullness in the pelvic area? Symptoms not present   Usually have a bulge or something falling out that you can see or feel in your vaginal area? Symptoms present and they bother me quite a bit   Ever have to push on the vagina or around the rectum to complete a bowel movement? Symptoms not present   Usually experience a feeling of incomplete bladder emptying? Symptoms present and they bother me somewhat   Ever have to push up on a bulge in the vaginal area with your fingers to start or complete urination? Symptoms not present   Do you...     Feel you need to strain too hard to have a bowel movement? Symptoms not present   Feel you have not completely emptied your bowels at the end of a bowel movement?  Symptoms present and they bother me somewhat   Usually lose stool beyond your control if your stool is well formed? Symptoms not present   Usually lose stool beyond your control if your stool is loose? Symptoms present and they bother me somewhat   Usually lose gas from your rectum beyond your control? Symptoms not present   Usually have pain when you pass your stool? Symptoms not present   Experience a strong sense of urgency and have to rush to the bathroom to have a bowel movement? Symptoms not present   Does part of your bowel ever pass through the rectum and bulge outside during or after a bowel movement? Symptoms present and they bother me somewhat   Do you...      Usually experience frequent urination? Symptoms present and they bother me somewhat   Usually experience urine leakage  associated with a feeling of urgency, that is, a strong sensation of needing to go to the bathroom? Symptoms present and they bother me somewhat   Usually experience urine leakage related to coughing, sneezing or laughing? Symptoms not present   Usually experience small amounts of urine leakage (that is, drops)? Symptoms present and they bother me somewhat   Usually experience difficulty emptying your bladder? Symptoms present and they bother me somewhat   Usually experience pain or discomfort in the lower abdomen or genital region? Symptoms present and they bother me somewhat   POPDI  (range: 0 - 100) 25   CRADI (range: 0 - 100) 18.75   IGNACIO (range: 0 - 100) 41.66   TOTAL SCORE  (range: 0 - 300) 85.41   Ohs Peq Urogyn Hpi      Question 1/21/2018  8:21 PM CST   General Urogynecology: Are you experiencing the following?     Dysuria (painful urination) No   Nocturia:  waking up at night to empty your bladder  Yes   If you answered yes to the previous question, how many times does this happen per night? 1-2   Enuresis (urine loss during sleep) No   Dribbling urine after you urinate Yes   Hematuria (urine appears red) No   Type of stream Interrupted   Urinary frequency: How often a day are you going to the bathroom per day?  Less than 10   Urinary Tract Infections: How many Urinary Tract Infections have you had in the past year? I have not had a UTI in the past year   If you have had a UTI in the past year, what treatments have you had so far?  I have not had a UTI in the past year   Urinary Incontinence (General): Are you experiencing the following?     Past consultation for incontinence: Have you ever seen someone for the evaluation of incontinence? No   If you answered yes to the previous question, please select all the therapies you have tried.  N/a- I answered no to the previous question   Please note the effectiveness of the therapies.     Need to wear protection to keep clothes dry  No   If you answered yes to the  "previous question, please kuldeep the protection you use.  N/a- I answered no to the previous question   If you wear protection, how much wetness is typically on each pad? N/a- I do not wear protection   If you wear protection, how often do you have to change per day, if applicable?      Stress Symptoms: Are you experiencing the following?     Leakage of urine with cough, laugh and/or sneeze No   If you answered yes to the previous question, what is the frequency in days, weeks and/or months? Never   Leakage of urine with sex No   Leakage of urine with bending/ lifting No   Leakage of urine with briskly walking or jogging No   If you lose urine for any other reason not previously mentioned, please note it below, if applicable.      Urge Symptoms: Are you experiencing the following?     Urgency ("got to go" feeling) Yes   Urge: How frequently do you feel an urge to urinate (feeling like you "gotta go" to the bathroom and can't wait) Several times a week   Do you experience a leakage of urine when you have a feeling of urgency?  No   Leakage of urine when unaware No   Past use of anticholinergics (medications used to treat overactive bladder) No   If you answered yes to the previous question, please kuldeep the anticholinergics you have used:      Have you ever used Mirbetriq (aka Mirabegron)?  No   Prolapse Symptoms: Are you experiencing any of the following?      Falling out/ Bulging/ Heaviness in the vagina (past opening) x years; wears tight underwear Yes   Vaginal/ Abdominal Pain/ Pressure Yes   Need to strain/ Push to void No   Need to wait on the toilet before you void No   Unusual position to urinate (using your hands to push back the vaginal bulge) No   Sensation of incomplete emptying Yes--has to PV/DV to help   Past use of pessary device No   If you answered yes to the previous question, please list the devices you have used below.      Bowel Symptoms: Are you experiencing any of the following?     Constipation " No   Diarrhea  No   Hematochezia (bloody stool) No   Incomplete evacuation of stool Yes   Involuntary loss of formed stool No   Fecal smearing/urgency Yes   Involuntary loss of gas No   Vaginal Symptoms: Are you experiencing any of the following?      Abnormal vaginal bleeding  No   Vaginal dryness Yes   Sexually active  Yes   Dyspareunia (painful intercourse) Yes   Estrogen use  No   Ohs Peq Pelvic Pain Urogyn      Question 1/21/2018  8:22 PM CST   Are you experiencing pelvic pain?  No      Patient Hx             02/14/2024  1)  UI:  (--) BERNARD   (--) UUI    (+) pantyliner:  Daytime frequency: Q 2-3 hours.  Nocturia: Yes: 2-3/night.   (--) dysuria,  (--) hematuria,  (--) frequent UTIs.  (+) complete bladder emptying.      2)  POP:  Absent with pessary in place.  Symptoms:(--)    (--) vaginal bleeding. (+) vaginal discharge--pink tinged. More with certain movements.   (+)/(--) sexually active--unable to have intercourse with pessary in place.  (+) dyspareunia.   (--)  Vaginal dryness.  (--) vaginal estrogen use. Tried estrogen cream but had trouble getting in.    --cannot remove pessary independently  --last removal 5 months ago.  --initial POP-Q (2018):  Aa 0; Ba 0; C +2; Ap 0; Bp 0; D -6.  Genital hiatus 3, perineal body 2, total vaginal length 11-12.      3)  BM:  (--) constipation/straining.  (--) chronic diarrhea. Consistency is sticky.  (--) hematochezia.  (--) fecal incontinence  (+) fecal smearing/urgency--sometimes sudden, without reason.  (--) incomplete evacuation.       4) pessary:  Denies pain or bleeding.  Scant pink discharge.  Using #3 ring with support.  Independent in use.          Changes from last visit:  1)  Stage 3 uterine prolapse:  --using  #3 ring + support   --denies pain or dischage  --scant blood today     2)  Vaginal atrophy (dryness):     --using vaginal estrogen cream                3)  Urinary urgency/post-void dribbling:  --rare UUI-- using 1-2 liner pads with minimal  wetness  --voiding every 2 hours during the day and 2-3/ night     4)Fecal smearing:  --improved since not eating dairy  --hydrates well  --not using fiber      Past Medical History:   Diagnosis Date    Brain bleed 1998    Osteoporosis     Pelvic fracture        Past Surgical History:   Procedure Laterality Date    COLONOSCOPY N/A 2/16/2024    Procedure: COLONOSCOPY;  Surgeon: ALKA Arguelles MD;  Location: [address]);  Service: Endoscopy;  Laterality: N/A;  Ref by Dr DIANA Copeland, Newly diagnosed AF-Pending Xarelto hold, PEG, portal - PC  ok to hold Xarelto 2 days per Dr Mcknight-GT  2/6/24- precall complete / Pt confirmed Xarelto holding ins.- [facility]    EYE SURGERY  2005    ingrown eye lashes BL    WISDOM TOOTH EXTRACTION         Family History   Problem Relation Name Age of Onset    Hypertension Mother      Heart disease Mother          MI age 77    Kidney failure Mother      Diabetes Mother      Parkinsonism Father      Hypertension Brother      Hypertension Brother      Diabetes Brother      No Known Problems Son      Breast cancer Neg Hx      Ovarian cancer Neg Hx      Cervical cancer Neg Hx      Endometrial cancer Neg Hx      Vaginal cancer Neg Hx      Asthma Neg Hx      Emphysema Neg Hx         Social History     Socioeconomic History    Marital status:    Occupational History    Occupation: Lab Supervisor   Tobacco Use    Smoking status: Never    Smokeless tobacco: Never   Substance and Sexual Activity    Alcohol use: Yes     Alcohol/week: 1.0 standard drink of alcohol     Types: 1 Glasses of wine per week     Comment: occasionally- 3-4 a month    Drug use: No    Sexual activity: Yes     Partners: Male   Social History Narrative    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      Research in Med School for years. - ophth and inf dz, studying antibodies.         Exercise:  Walking 3 days a week, swam in past, resistance machines' dumbbells, table tennis.          Chinese.   Emigrated 1988.         with CAD/CABG.  . OPP     Social Determinants of Health     Financial Resource Strain: Low Risk  (2/9/2024)    Overall Financial Resource Strain (CARDIA)     Difficulty of Paying Living Expenses: Not hard at all   Food Insecurity: No Food Insecurity (2/9/2024)    Hunger Vital Sign     Worried About Running Out of Food in the Last Year: Never true     Ran Out of Food in the Last Year: Never true   Transportation Needs: No Transportation Needs (2/9/2024)    PRAPARE - Transportation     Lack of Transportation (Medical): No     Lack of Transportation (Non-Medical): No   Physical Activity: Insufficiently Active (2/9/2024)    Exercise Vital Sign     Days of Exercise per Week: 2 days     Minutes of Exercise per Session: 50 min   Stress: No Stress Concern Present (2/9/2024)    [employer] - Occupational Stress Questionnaire     Feeling of Stress : Only a little   Housing Stability: Low Risk  (2/9/2024)    Housing Stability Vital Sign     Unable to Pay for Housing in the Last Year: No     Number of Places Lived in the Last Year: 1     Unstable Housing in the Last Year: No       Current Outpatient Medications   Medication Sig Dispense Refill    alendronate (FOSAMAX) 70 MG tablet TAKE 1 TABLET BY MOUTH WEEKLY  WITH 8 OZ OF PLAIN WATER 30  MINUTES BEFORE FIRST FOOD, DRINK OR MEDS. STAY UPRIGHT FOR 30  MINS 12 tablet 3    CALCIUM CARBONATE (CALCIUM 600 ORAL) Take by mouth.      cholecalciferol, vitamin D3, 125 mcg (5,000 unit) Tab Take 5,000 Units by mouth once daily.      estradioL (ESTRACE) 0.01 % (0.1 mg/gram) vaginal cream 0.5 grams with applicator or dime-sized amount with finger in vagina nightly x 2 weeks, then twice a week thereafter 42.5 g 11    rivaroxaban (XARELTO) 20 mg Tab Take 1 tablet (20 mg total) by mouth daily with dinner or evening meal. 90 tablet 3    flecainide (TAMBOCOR) 150 MG Tab Take 300 mg (2 tabs) by mouth as needed for AF. Limit  "one dose per day. (Patient not taking: Reported on 6/17/2024) 30 tablet 3    metoprolol tartrate (LOPRESSOR) 25 MG tablet Take 1 tablet (25 mg total) by mouth daily as needed (Palpitations). (Patient not taking: Reported on 6/17/2024) 30 tablet 0     No current facility-administered medications for this visit.       Review of patient's allergies indicates:  No Known Allergies    Well woman:  Pap test: 2016, normal.  History of abnormal paps: No.  History of STIs:  No  Mammogram: Date of last: 7/2023.  Result: Normal  Colonoscopy: Date of last: 2010.  Result:  normal.  Repeat due:  2020.  Scheduled for Fri 2-.  DEXA:  Date of last: 2022.  Result:  Osteoporosis, on meds.  Repeat due:  2022.    ROS:  As per HPI.      Exam  BP (!) 95/58 (BP Location: Left arm, Patient Position: Sitting, BP Method: Medium (Automatic))   Pulse 71   Ht 5' 1" (1.549 m)   Wt 47.5 kg (104 lb 11.5 oz)   BMI 19.79 kg/m²   General: alert and oriented, no acute distress  Respiratory: normal respiratory effort  Abd: soft, non-tender, non-distended    Pelvic  Ext. Genitalia: normal external genitalia. Normal bartholin's and skeens glands  Vagina: + atrophy. Normal vaginal mucosa without lesions. No discharge noted.   Non-tender bladder base without palpable mass.  #3 ring with support in palce  Cervix: no lesions  Uterus:  uterus is normal size, shape, consistency and nontender   Urethra: no masses or tenderness  Urethral meatus: no lesions, caruncle or prolapse.    Pessary removed without difficulty. Washed with soap and water. Reinserted without difficulty.       Impression  1. Uterovaginal prolapse        2. Vaginal atrophy        3. Pessary maintenance          We reviewed the above issues and discussed options for short-term versus long-term management of her problems.   Plan:   1)  Stage 3 uterine prolapse:  --continue pessary: #3 ring + support   PESSARY CARE: Remove pessary as frequently as nightly and as infrequently as every " 2-3 weeks.  Remove before intercourse.  May need to remove before bowel movements if straining dislodges.   Wash with soap and water (no ).  Replace using small amount of water-based lubricant (like KY jelly) at end being introduced into vagina.    --having more trouble removing/replacing independently              --RTC Q3-4 months for pessary checks for now     --if tires of pessary use and is done with working (can't take off time for surgery right now):  --surgical option: vaginal hysterectomy, uterosacral suspension, anterior/posterior repair  --would need ultrasound/bladder testing beforehand  --would need plan from PCP or cards to bridge off/back on xarelto and if needs injections while off (usually give heparin 5000 U SQ preop, then Q8h periop)  --would need clearance per PCP (Min) + labs (CBC, CMP, T&S)/EKG     2)  Vaginal atrophy (dryness):     --use Vaginal estrogen:  --Use 0.5 grams of estrogen cream in vagina with applicator or dime-sized amount with finger (as far as can reach internally) nightly x 2 weeks, then twice a week thereafter.  You can also apply a dime-sized amount with your finger around the vaginal opening and inner lips at same frequency.               --try to get finger above or beneath pessary ring                          --Vaginal estrogen may help to decrease pain related to dryness with intercourse and urinary symptoms (urgency/frequency/UTIs) around menopause.                 3)  Urinary urgency/post-void dribbling:  --trial of pessary   --Empty bladder every 3 hours.  Empty well: wait a minute, lean forward on toilet.    --Avoid dietary irritants (see sheet).  Keep diary x 3-5 days to determine your irritants.  --KEGELS: do 10 in AM and 10 in PM, holding each x 10 seconds.  When you feel urge to go, STOP, KEGEL, and when urge has passed, then go to bathroom.  Consider PT in future.    --URGE: consider medication in future.  Takes 2-4 weeks to see if will have  effect.  For dry mouth: get sour, sugar free lozenge or gum.       4) Elevated PVR:  --PVR improved with pessary and on last check     5)  Fecal smearing:  --hydrate well  --avoid dietary triggers     6)  RTC 3 months for pc      I spent a total of 20 minutes on the day of the visit.  This includes face to face time and non-face to face time preparing to see the patient (eg, review of tests), obtaining and/or reviewing separately obtained history, documenting clinical information in the electronic or other health record, independently interpreting results and communicating results to the patient/family/caregiver, or care coordinator.       Farheen Hopkins, SRI-BC  [facility]  Division of Female Pelvic Medicine and Reconstructive Surgery  Department of Obstetrics & Gynecology

## 2024-07-25 NOTE — PATIENT INSTRUCTIONS
Plan:   1)  Stage 3 uterine prolapse:  --continue pessary: #3 ring + support   PESSARY CARE: Remove pessary as frequently as nightly and as infrequently as every 2-3 weeks.  Remove before intercourse.  May need to remove before bowel movements if straining dislodges.   Wash with soap and water (no ).  Replace using small amount of water-based lubricant (like KY jelly) at end being introduced into vagina.    --having more trouble removing/replacing independently              --RTC Q3-4 months for pessary checks for now     --if tires of pessary use and is done with working (can't take off time for surgery right now):  --surgical option: vaginal hysterectomy, uterosacral suspension, anterior/posterior repair  --would need ultrasound/bladder testing beforehand  --would need plan from PCP or cards to bridge off/back on xarelto and if needs injections while off (usually give heparin 5000 U SQ preop, then Q8h periop)  --would need clearance per PCP (Min) + labs (CBC, CMP, T&S)/EKG     2)  Vaginal atrophy (dryness):     --use Vaginal estrogen:  --Use 0.5 grams of estrogen cream in vagina with applicator or dime-sized amount with finger (as far as can reach internally) nightly x 2 weeks, then twice a week thereafter.  You can also apply a dime-sized amount with your finger around the vaginal opening and inner lips at same frequency.               --try to get finger above or beneath pessary ring                          --Vaginal estrogen may help to decrease pain related to dryness with intercourse and urinary symptoms (urgency/frequency/UTIs) around menopause.                 3)  Urinary urgency/post-void dribbling:  --trial of pessary   --Empty bladder every 3 hours.  Empty well: wait a minute, lean forward on toilet.    --Avoid dietary irritants (see sheet).  Keep diary x 3-5 days to determine your irritants.  --KEGELS: do 10 in AM and 10 in PM, holding each x 10 seconds.  When you feel urge to go, STOP,  KEGEL, and when urge has passed, then go to bathroom.  Consider PT in future.    --URGE: consider medication in future.  Takes 2-4 weeks to see if will have effect.  For dry mouth: get sour, sugar free lozenge or gum.       4) Elevated PVR:  --PVR improved with pessary and on last check     5)  Fecal smearing:  --hydrate well  --avoid dietary triggers     6)  RTC 3 months for pc

## 2024-07-29 ENCOUNTER — HOSPITAL ENCOUNTER (OUTPATIENT)
Dept: RADIOLOGY | Facility: HOSPITAL | Age: 74
Discharge: HOME | End: 2024-07-29
Attending: INTERNAL MEDICINE
Payer: COMMERCIAL

## 2024-07-29 DIAGNOSIS — Z12.31 ENCOUNTER FOR SCREENING MAMMOGRAM FOR BREAST CANCER: ICD-10-CM

## 2024-07-29 PROCEDURE — 77063 BREAST TOMOSYNTHESIS BI: CPT | Mod: 26,,, | Performed by: RADIOLOGY

## 2024-07-29 PROCEDURE — 77067 SCR MAMMO BI INCL CAD: CPT | Mod: TC

## 2024-07-29 PROCEDURE — 77063 BREAST TOMOSYNTHESIS BI: CPT | Mod: TC

## 2024-07-29 PROCEDURE — 77067 SCR MAMMO BI INCL CAD: CPT | Mod: 26,,, | Performed by: RADIOLOGY

## 2024-07-31 ENCOUNTER — HOSPITAL ENCOUNTER (OUTPATIENT)
Dept: RADIOLOGY | Facility: CLINIC | Age: 74
Discharge: HOME | End: 2024-07-31
Attending: INTERNAL MEDICINE
Payer: COMMERCIAL

## 2024-07-31 DIAGNOSIS — Z78.0 MENOPAUSE: ICD-10-CM

## 2024-07-31 PROCEDURE — 77080 DXA BONE DENSITY AXIAL: CPT | Mod: TC

## 2024-08-06 ENCOUNTER — PATIENT OUTREACH (OUTPATIENT)
Dept: ADMINISTRATIVE | Facility: HOSPITAL | Age: 74
End: 2024-08-06
Payer: COMMERCIAL

## 2024-08-07 ENCOUNTER — TELEPHONE (OUTPATIENT)
Dept: INFECTIOUS DISEASES | Facility: CLINIC | Age: 74
End: 2024-08-07
Payer: COMMERCIAL

## 2024-08-09 ENCOUNTER — PATIENT MESSAGE (OUTPATIENT)
Dept: INFECTIOUS DISEASES | Facility: CLINIC | Age: 74
End: 2024-08-09
Payer: COMMERCIAL

## 2024-08-20 ENCOUNTER — OFFICE VISIT (OUTPATIENT)
Dept: INTERNAL MEDICINE | Facility: CLINIC | Age: 74
End: 2024-08-20
Payer: COMMERCIAL

## 2024-08-20 ENCOUNTER — LAB VISIT (OUTPATIENT)
Dept: LAB | Facility: HOSPITAL | Age: 74
End: 2024-08-20
Attending: INTERNAL MEDICINE
Payer: COMMERCIAL

## 2024-08-20 VITALS
HEART RATE: 64 BPM | BODY MASS INDEX: 19.68 KG/M2 | SYSTOLIC BLOOD PRESSURE: 120 MMHG | HEIGHT: 61 IN | DIASTOLIC BLOOD PRESSURE: 74 MMHG | OXYGEN SATURATION: 98 % | WEIGHT: 104.25 LBS

## 2024-08-20 DIAGNOSIS — R79.89 LFT ELEVATION: ICD-10-CM

## 2024-08-20 DIAGNOSIS — Z00.00 ANNUAL PHYSICAL EXAM: ICD-10-CM

## 2024-08-20 DIAGNOSIS — M81.0 OSTEOPOROSIS, POST-MENOPAUSAL: ICD-10-CM

## 2024-08-20 DIAGNOSIS — J47.9 ADULT BRONCHIECTASIS: ICD-10-CM

## 2024-08-20 DIAGNOSIS — R73.03 PRE-DIABETES: ICD-10-CM

## 2024-08-20 DIAGNOSIS — D64.9 ANEMIA, UNSPECIFIED TYPE: ICD-10-CM

## 2024-08-20 DIAGNOSIS — Z00.00 ANNUAL PHYSICAL EXAM: Primary | ICD-10-CM

## 2024-08-20 DIAGNOSIS — I48.0 PAF (PAROXYSMAL ATRIAL FIBRILLATION): ICD-10-CM

## 2024-08-20 DIAGNOSIS — A31.0 MYCOBACTERIUM AVIUM INFECTION: ICD-10-CM

## 2024-08-20 LAB
ALBUMIN SERPL BCP-MCNC: 3.6 G/DL (ref 3.5–5.2)
ALP SERPL-CCNC: 76 U/L (ref 55–135)
ALT SERPL W/O P-5'-P-CCNC: 17 U/L (ref 10–44)
ANION GAP SERPL CALC-SCNC: 7 MMOL/L (ref 8–16)
AST SERPL-CCNC: 20 U/L (ref 10–40)
BILIRUB SERPL-MCNC: 0.5 MG/DL (ref 0.1–1)
BUN SERPL-MCNC: 12 MG/DL (ref 8–23)
CALCIUM SERPL-MCNC: 9.9 MG/DL (ref 8.7–10.5)
CHLORIDE SERPL-SCNC: 105 MMOL/L (ref 95–110)
CHOLEST SERPL-MCNC: 187 MG/DL (ref 120–199)
CHOLEST/HDLC SERPL: 3.6 {RATIO} (ref 2–5)
CO2 SERPL-SCNC: 27 MMOL/L (ref 23–29)
CREAT SERPL-MCNC: 0.7 MG/DL (ref 0.5–1.4)
EST. GFR  (NO RACE VARIABLE): >60 ML/MIN/1.73 M^2
FERRITIN SERPL-MCNC: 326 NG/ML (ref 20–300)
GLUCOSE SERPL-MCNC: 88 MG/DL (ref 70–110)
HDLC SERPL-MCNC: 52 MG/DL (ref 40–75)
HDLC SERPL: 27.8 % (ref 20–50)
IRON SERPL-MCNC: 70 UG/DL (ref 30–160)
LDLC SERPL CALC-MCNC: 122.6 MG/DL (ref 63–159)
NONHDLC SERPL-MCNC: 135 MG/DL
POTASSIUM SERPL-SCNC: 4.5 MMOL/L (ref 3.5–5.1)
PROT SERPL-MCNC: 8.2 G/DL (ref 6–8.4)
SATURATED IRON: 20 % (ref 20–50)
SODIUM SERPL-SCNC: 139 MMOL/L (ref 136–145)
TOTAL IRON BINDING CAPACITY: 342 UG/DL (ref 250–450)
TRANSFERRIN SERPL-MCNC: 231 MG/DL (ref 200–375)
TRIGL SERPL-MCNC: 62 MG/DL (ref 30–150)

## 2024-08-20 PROCEDURE — 1126F AMNT PAIN NOTED NONE PRSNT: CPT | Mod: CPTII,S$GLB,, | Performed by: INTERNAL MEDICINE

## 2024-08-20 PROCEDURE — 1101F PT FALLS ASSESS-DOCD LE1/YR: CPT | Mod: CPTII,S$GLB,, | Performed by: INTERNAL MEDICINE

## 2024-08-20 PROCEDURE — 3078F DIAST BP <80 MM HG: CPT | Mod: CPTII,S$GLB,, | Performed by: INTERNAL MEDICINE

## 2024-08-20 PROCEDURE — 3288F FALL RISK ASSESSMENT DOCD: CPT | Mod: CPTII,S$GLB,, | Performed by: INTERNAL MEDICINE

## 2024-08-20 PROCEDURE — 80061 LIPID PANEL: CPT | Performed by: INTERNAL MEDICINE

## 2024-08-20 PROCEDURE — 99397 PER PM REEVAL EST PAT 65+ YR: CPT | Mod: S$GLB,,, | Performed by: INTERNAL MEDICINE

## 2024-08-20 PROCEDURE — 36415 COLL VENOUS BLD VENIPUNCTURE: CPT | Performed by: INTERNAL MEDICINE

## 2024-08-20 PROCEDURE — 3008F BODY MASS INDEX DOCD: CPT | Mod: CPTII,S$GLB,, | Performed by: INTERNAL MEDICINE

## 2024-08-20 PROCEDURE — 1159F MED LIST DOCD IN RCRD: CPT | Mod: CPTII,S$GLB,, | Performed by: INTERNAL MEDICINE

## 2024-08-20 PROCEDURE — 83540 ASSAY OF IRON: CPT | Performed by: INTERNAL MEDICINE

## 2024-08-20 PROCEDURE — 99999 PR PBB SHADOW E&M-EST. PATIENT-LVL III: CPT | Mod: PBBFAC,,, | Performed by: INTERNAL MEDICINE

## 2024-08-20 PROCEDURE — 82728 ASSAY OF FERRITIN: CPT | Performed by: INTERNAL MEDICINE

## 2024-08-20 PROCEDURE — 3074F SYST BP LT 130 MM HG: CPT | Mod: CPTII,S$GLB,, | Performed by: INTERNAL MEDICINE

## 2024-08-20 PROCEDURE — 80053 COMPREHEN METABOLIC PANEL: CPT | Performed by: INTERNAL MEDICINE

## 2024-08-20 PROCEDURE — 85027 COMPLETE CBC AUTOMATED: CPT | Performed by: INTERNAL MEDICINE

## 2024-08-20 RX ORDER — HEPARIN 100 UNIT/ML
500 SYRINGE INTRAVENOUS
OUTPATIENT
Start: 2024-08-20

## 2024-08-20 RX ORDER — ZOLEDRONIC ACID 5 MG/100ML
5 INJECTION, SOLUTION INTRAVENOUS
OUTPATIENT
Start: 2024-08-20

## 2024-08-20 RX ORDER — SODIUM CHLORIDE 0.9 % (FLUSH) 0.9 %
10 SYRINGE (ML) INJECTION
OUTPATIENT
Start: 2024-08-20

## 2024-08-20 RX ORDER — ACETAMINOPHEN 500 MG
500 TABLET ORAL
OUTPATIENT
Start: 2024-08-20

## 2024-08-20 NOTE — PROGRESS NOTES
Subjective     Patient ID: Luly Lora is a 73 y.o. female.    Chief Complaint: Annual Exam    HPI  We reviewed frustrations over tx of RUL pneumonia with assoc + quantiferon gold.  She was tx with vantin.  Tb PCR was negative.     Culture ultimately  8/1 grew out  mycobacterium avium complex.    She received tx by [facility] with RIPE therapy, which was stopped 7/16 due to nausea, vomiting and rash.  Alt 103 and  July 11  A1c 6.      CT 7/20:  Extensive airspace disease nodularity bronchiectasis are seen in the right upper lobe with a cavitary area with an air-fluid level.   There are additional lesser foci which have worsened in the lung bases bilaterally.       Dr Hood has referred pt to Dr Barrera.  She is considering receiving an opinion from [facility] also.    We reviewed recent Dexa - which showed persistent osteoporosis.  Reclast was ordered in 2022, but was never administered.  No h/o fracture.    Intermittent a fib, managed by Cards.    She declined ablation, but might reconsider if abx for MAC cross react with cardiac meds.    A1c mildly elevated - strong FH diabetes.  She is thin and has a healthy diet.    Review of Systems   Constitutional:  Negative for activity change and unexpected weight change.   HENT:  Negative for hearing loss, rhinorrhea and trouble swallowing.    Eyes:  Negative for discharge and visual disturbance.   Respiratory:  Negative for chest tightness and wheezing.    Cardiovascular:  Negative for chest pain and palpitations.   Gastrointestinal:  Negative for blood in stool, constipation, diarrhea and vomiting.   Endocrine: Negative for polydipsia and polyuria.   Genitourinary:  Negative for difficulty urinating, dysuria, hematuria and menstrual problem.   Musculoskeletal:  Negative for arthralgias, joint swelling and neck pain.   Neurological:  Negative for weakness and headaches.   Psychiatric/Behavioral:  Negative for confusion and dysphoric mood.           Objective      Physical Exam  Constitutional:       General: She is not in acute distress.     Appearance: She is well-developed.   HENT:      Head: Normocephalic and atraumatic.      Right Ear: External ear normal.      Left Ear: External ear normal.      Nose: Nose normal.   Eyes:      General: No scleral icterus.        Right eye: No discharge.         Left eye: No discharge.      Conjunctiva/sclera: Conjunctivae normal.      Pupils: Pupils are equal, round, and reactive to light.   Neck:      Thyroid: No thyromegaly.      Vascular: No JVD.   Cardiovascular:      Rate and Rhythm: Normal rate and regular rhythm.      Heart sounds: Normal heart sounds. No murmur heard.     No gallop.   Pulmonary:      Effort: Pulmonary effort is normal. No respiratory distress.      Breath sounds: Normal breath sounds. No wheezing or rales.   Abdominal:      General: Bowel sounds are normal. There is no distension.      Palpations: Abdomen is soft. There is no mass.      Tenderness: There is no abdominal tenderness. There is no guarding or rebound.   Musculoskeletal:         General: No tenderness. Normal range of motion.      Cervical back: Normal range of motion and neck supple.   Lymphadenopathy:      Cervical: No cervical adenopathy.   Skin:     General: Skin is warm and dry.      Findings: No rash.   Neurological:      Mental Status: She is alert and oriented to person, place, and time.      Cranial Nerves: No cranial nerve deficit.      Coordination: Coordination normal.   Psychiatric:         Behavior: Behavior normal.         Thought Content: Thought content normal.         Judgment: Judgment normal.            Assessment and Plan     1. Annual physical exam  -     Comprehensive Metabolic Panel; Future; Expected date: 08/20/2024  -     CBC Without Differential; Future; Expected date: 08/20/2024  -     Iron and TIBC; Future; Expected date: 08/20/2024  -     Ferritin; Future; Expected date: 08/20/2024  -     Lipid Panel; Future;  Expected date: 08/20/2024    2. Adult bronchiectasis  Assessment & Plan:  Managed by pulmonary      3. Osteoporosis, post-menopausal    4. Pre-diabetes    5. Mycobacterium avium infection    6. PAF (paroxysmal atrial fibrillation)    7. LFT elevation    8. Anemia, unspecified type        Encouraged to treat MAC.  2nd opinion through [facility] if she would like that reassurance  Rsv now  Reclast soon, and again in 1 year, followed by Dexa    RTC 3 mo -        No follow-ups on file.

## 2024-08-20 NOTE — PROGRESS NOTES
Chief Complaint   Patient presents with    Annual Exam      Subjective:      Luly Lora is a 73 y.o. year old female who presents to the [facility] on 08/20/2024 for annual follow up  Pt had an extensive medical follow up starting on June/2024 when she presented to her urgent care with symptoms of fevers up to 102, cough,and sputum production  Chest xray that time showing right upper lobe pneumonia, patchy opacities, and volume loss in the middle lobe and lingula. She was initially started on antibiotic to for treatment of pneumonia. A Quantiferon Gold test was ordered, returning positive, prompting the patient to follow up in infectious disease. ID ordered an AFB smear, which came back positive. The patients care was transferred to the [place] [facility], where she was started on RIPE therapy. However, the treatment was discontinued after a week due to side effects, including rash, elevated transaminases, and confusion. Subsequently, a sputum mycobacteria culture returned positive for MAC on 08/01/2024.    Of note, patient had an abnormal chest imaging that was evaluated by the pulmonary clinic in 2018. At that time, a CT scan done for abdominal pain incidentally revealed bronchiectasis, though she was asymptomatic with normal PFT. A T-spot test was planned and eventually completed in 2020, which was negative. Given the negative T-spot, an active surveillance strategy for presumed Mycobacterium avium-intracellulare (FAYE) was pursued. Notably, the patient received the BCG vaccine as a child.    Currently patient reports of resolved fever, chills and myalgia. Reports of minor cough with clear sputum production  Denies any dizziness, headaches, cp, sob, abdominal pain, urinary or bowel dysfunction    Paroxysmal Afib  Followed by Cardiology and was considering ablation before  Afib episodes have been minor with few episodes happening in a month. Currently on Xarelto for anticoagulation and Lopressor as needed for  palpitations    Osteoporosis   Currently managed with vit D3 and weekly fosamax   Recent DEXA scan in June showing progression of osteoporosis  Reclast was considered in past but never given    Prediabetes  Recent workup at TB clinic showing HbA1c of 6.0   Has family history of diabetes in mother and sibilings    Starting to exercise more by walking and increasing her appetite after the MAC workup      Vitals:    08/20/24 0816   BP: 120/74   Pulse: 64        Patient Active Problem List   Diagnosis    Left foot drop    Osteoporosis, post-menopausal    Uterovaginal prolapse    History of BCG vaccination    Vaginal atrophy    Adult bronchiectasis    PAF (paroxysmal atrial fibrillation)    Long term (current) use of anticoagulants    Mycobacterium avium infection       Current Outpatient Medications   Medication Sig Dispense Refill    alendronate (FOSAMAX) 70 MG tablet TAKE 1 TABLET BY MOUTH WEEKLY  WITH 8 OZ OF PLAIN WATER 30  MINUTES BEFORE FIRST FOOD, DRINK OR MEDS. STAY UPRIGHT FOR 30  MINS 12 tablet 3    CALCIUM CARBONATE (CALCIUM 600 ORAL) Take by mouth.      cholecalciferol, vitamin D3, 125 mcg (5,000 unit) Tab Take 5,000 Units by mouth once daily.      estradioL (ESTRACE) 0.01 % (0.1 mg/gram) vaginal cream 0.5 grams with applicator or dime-sized amount with finger in vagina nightly x 2 weeks, then twice a week thereafter 42.5 g 11    rivaroxaban (XARELTO) 20 mg Tab Take 1 tablet (20 mg total) by mouth daily with dinner or evening meal. 90 tablet 3    flecainide (TAMBOCOR) 150 MG Tab Take 300 mg (2 tabs) by mouth as needed for AF. Limit one dose per day. (Patient not taking: Reported on 6/17/2024) 30 tablet 3    metoprolol tartrate (LOPRESSOR) 25 MG tablet Take 1 tablet (25 mg total) by mouth daily as needed (Palpitations). (Patient not taking: Reported on 6/17/2024) 30 tablet 0    RSVPreF3 antigen-AS01E, PF, (AREXVY, PF,) 120 mcg/0.5 mL SusR vaccine Inject 0.5 mLs into the muscle once. for 1 dose 1 each 0     No  current facility-administered medications for this visit.       Review of Systems:  Review of Systems   Constitutional:  Negative for chills, fever and weight loss.   HENT:  Negative for hearing loss.    Eyes:  Negative for discharge.   Respiratory:  Positive for cough and sputum production (Clear/green sputum). Negative for shortness of breath and wheezing.    Cardiovascular:  Negative for chest pain, palpitations and leg swelling.   Gastrointestinal:  Negative for blood in stool, constipation, diarrhea and vomiting.   Genitourinary:  Negative for dysuria and hematuria.   Musculoskeletal:  Negative for neck pain.   Skin: Negative.    Neurological:  Negative for weakness and headaches.   Endo/Heme/Allergies:  Negative for polydipsia.   Psychiatric/Behavioral: Negative.          Objective:     Physical Exam:  Physical Exam  Constitutional:       Appearance: Normal appearance.   HENT:      Head: Normocephalic.      Nose: Nose normal. No congestion or rhinorrhea.   Eyes:      Pupils: Pupils are equal, round, and reactive to light.   Cardiovascular:      Rate and Rhythm: Normal rate and regular rhythm.      Pulses: Normal pulses.      Heart sounds: Normal heart sounds. No murmur heard.     No gallop.   Pulmonary:      Effort: Pulmonary effort is normal.      Breath sounds: Rales (minor rales lower lung fields) present.   Abdominal:      General: Abdomen is flat. There is no distension.      Palpations: Abdomen is soft. There is no mass.      Tenderness: There is no abdominal tenderness.   Musculoskeletal:         General: Normal range of motion.      Cervical back: Normal range of motion.   Skin:     General: Skin is warm.      Capillary Refill: Capillary refill takes less than 2 seconds.   Neurological:      General: No focal deficit present.      Mental Status: She is alert.   Psychiatric:         Mood and Affect: Mood normal.        Assessment:       1. Annual physical exam    2. Adult bronchiectasis    3.  Osteoporosis, post-menopausal    4. Pre-diabetes    5. Mycobacterium avium infection    6. PAF (paroxysmal atrial fibrillation)    7. LFT elevation    8. Anemia, unspecified type        Plan:     Luly was seen today for annual exam.    Diagnoses and all orders for this visit:    Annual physical exam  -     Comprehensive Metabolic Panel; Future  -     CBC Without Differential; Future  -     Iron and TIBC; Future  -     Ferritin; Future  -     Lipid Panel; Future    Adult bronchiectasis        -    2018 CT scan showing bronchiectasis and  followed up with negative T spot test. Followed up with  and active surveillance strategy for presumed Mycobacterium avium-intracellulare (FAYE) was pursued        -    June/2024 Quantiferon gold returned positive and treated with RIPE therapy for about a week for suspected TB. Mycobateria culture eventually showing MAC         -    The patient will follow up with a specialist [facility] for a second opinion regarding the treatment of MAC        -    Has ID follow up coming up in recent week        -    Continue to monitor    Osteoporosis, post-menopausal        -    Last DEXA scan in June showing progression of osteoporosis        -    Currently on Fosamax and vit D3        -    Reclast was considered in 2022 but never given         -    Will try to start reclast and monitor progression    Pre-diabetes        -    Recent HbA1c done at TB clinic showing HbA1c at 6        -    Considering her bmi, body habitus and concurrent medical condition will recheck in couple months    Mycobacterium avium infection       -     See Adult Bronchiectasis     PAF (paroxysmal atrial fibrillation)       -     Follows Cardiology and currently on xarelto for anticoagulation and Lopressor for as needed palpitations       -     Pt is still considering ablation and will reach out to Cardiology for further recs    LFT elevation       -    After starting her RIPE therapy, Pt endured side effects of  elevated transaminases       -    Will repeat CMP, Lipid Panel to monitor improvement    Anemia, unspecified type       -    Repeat CBC, Iron panel     Other orders  -     acetaminophen tablet 500 mg  -     zoledronic acid-mannitol & water 5 mg/100 mL infusion 5 mg  -     0.9% NaCl 250 mL flush bag  -     sodium chloride 0.9% flush 10 mL  -     heparin, porcine (PF) 100 unit/mL injection flush 500 Units  -     alteplase injection 2 mg  -     acetaminophen tablet 500 mg  -     zoledronic acid-mannitol & water 5 mg/100 mL infusion 5 mg  -     0.9% NaCl 250 mL flush bag  -     sodium chloride 0.9% flush 10 mL  -     heparin, porcine (PF) 100 unit/mL injection flush 500 Units  -     alteplase injection 2 mg    Patient will follow up with [facility] provider for second opinion on MAC treatment      Will repeat CBC, CMP, Iron panel, and lipid panel    Recheck HbA1c in couple month    Reclast ordered    RSV given     Follow up in 3 month

## 2024-08-21 LAB
ERYTHROCYTE [DISTWIDTH] IN BLOOD BY AUTOMATED COUNT: 15.8 % (ref 11.5–14.5)
HCT VFR BLD AUTO: 39.7 % (ref 37–48.5)
HGB BLD-MCNC: 12 G/DL (ref 12–16)
MCH RBC QN AUTO: 30.2 PG (ref 27–31)
MCHC RBC AUTO-ENTMCNC: 30.2 G/DL (ref 32–36)
MCV RBC AUTO: 100 FL (ref 82–98)
PLATELET # BLD AUTO: 248 K/UL (ref 150–450)
PMV BLD AUTO: 10.3 FL (ref 9.2–12.9)
RBC # BLD AUTO: 3.98 M/UL (ref 4–5.4)
WBC # BLD AUTO: 5.58 K/UL (ref 3.9–12.7)

## 2024-08-27 ENCOUNTER — OFFICE VISIT (OUTPATIENT)
Dept: INFECTIOUS DISEASES | Facility: CLINIC | Age: 74
End: 2024-08-27
Payer: COMMERCIAL

## 2024-08-27 VITALS
TEMPERATURE: 98 F | HEART RATE: 55 BPM | WEIGHT: 105.38 LBS | HEIGHT: 61 IN | DIASTOLIC BLOOD PRESSURE: 62 MMHG | SYSTOLIC BLOOD PRESSURE: 103 MMHG | BODY MASS INDEX: 19.9 KG/M2

## 2024-08-27 DIAGNOSIS — A31.0 PULMONARY MYCOBACTERIUM AVIUM COMPLEX (MAC) INFECTION: Primary | ICD-10-CM

## 2024-08-27 PROCEDURE — 3008F BODY MASS INDEX DOCD: CPT | Mod: CPTII,S$GLB,, | Performed by: INTERNAL MEDICINE

## 2024-08-27 PROCEDURE — 1159F MED LIST DOCD IN RCRD: CPT | Mod: CPTII,S$GLB,, | Performed by: INTERNAL MEDICINE

## 2024-08-27 PROCEDURE — 3078F DIAST BP <80 MM HG: CPT | Mod: CPTII,S$GLB,, | Performed by: INTERNAL MEDICINE

## 2024-08-27 PROCEDURE — G2211 COMPLEX E/M VISIT ADD ON: HCPCS | Mod: S$GLB,,, | Performed by: INTERNAL MEDICINE

## 2024-08-27 PROCEDURE — 1101F PT FALLS ASSESS-DOCD LE1/YR: CPT | Mod: CPTII,S$GLB,, | Performed by: INTERNAL MEDICINE

## 2024-08-27 PROCEDURE — 1126F AMNT PAIN NOTED NONE PRSNT: CPT | Mod: CPTII,S$GLB,, | Performed by: INTERNAL MEDICINE

## 2024-08-27 PROCEDURE — 3074F SYST BP LT 130 MM HG: CPT | Mod: CPTII,S$GLB,, | Performed by: INTERNAL MEDICINE

## 2024-08-27 PROCEDURE — 3288F FALL RISK ASSESSMENT DOCD: CPT | Mod: CPTII,S$GLB,, | Performed by: INTERNAL MEDICINE

## 2024-08-27 PROCEDURE — 99417 PROLNG OP E/M EACH 15 MIN: CPT | Mod: S$GLB,,, | Performed by: INTERNAL MEDICINE

## 2024-08-27 PROCEDURE — 99999 PR PBB SHADOW E&M-EST. PATIENT-LVL III: CPT | Mod: PBBFAC,,, | Performed by: INTERNAL MEDICINE

## 2024-08-27 PROCEDURE — 99215 OFFICE O/P EST HI 40 MIN: CPT | Mod: S$GLB,,, | Performed by: INTERNAL MEDICINE

## 2024-08-27 RX ORDER — ETHAMBUTOL HYDROCHLORIDE 400 MG/1
800 TABLET, FILM COATED ORAL DAILY
Qty: 60 TABLET | Refills: 5 | Status: SHIPPED | OUTPATIENT
Start: 2024-08-27 | End: 2025-08-27

## 2024-08-27 RX ORDER — AZITHROMYCIN 500 MG/1
500 TABLET, FILM COATED ORAL DAILY
Qty: 30 TABLET | Refills: 5 | Status: SHIPPED | OUTPATIENT
Start: 2024-08-27 | End: 2025-08-27

## 2024-08-27 NOTE — PROGRESS NOTES
"INFECTIOUS DISEASE CLINIC  8/27/24     Subjective:      Chief Complaint:   Chief Complaint   Patient presents with    Follow-up       History of Present Illness:    Patient Luly Lora is a 73 y.o. female with h/o a.fib on Xarelto on who presents today for hospital follow up. Was admitted in June with fever, cough, pleuritic chest pain. Found to have pneumonia and cavitary lesion with + IGRA. Treated for CAP with cefpodoxime and referred to [facility] for concern for possible pulmonary TB.     Pt reports she felt very sick with the medications she received at [place] (RIF, INH, PZA, ETB, B6). Reports she was taking 10 pills a day and it caused her to have fatigue, N/V, didn't feel well, no appetite, rashes, liver enzyme elevated.  Briefly got off of xarelto with tb meds (switched to coumadin, but had problems with INR being stable). Reports antibiotics were stopped after about a week because of LFT elevation and AFB cultures ultimately grew MAC (not M.TB).     Today, "Feel fine"  Still some thick sputum  No fatigue  No wt loss    Says she has always had a positive skin test for TB and when she worked at [employer] was told she just needed x-ray    PMHx:   BCG vaccination during childhood  Atrial fibrillation  Osteoporosis      Sochx:   [place] 1986  [employer], teaching lab supervisor- Cell biology. Previously worked at [employer]  No etoh  No travel since covid (2019- went back to [place])        TB Gold Plus Negative Positive Abnormal          Review of Symptoms:  Constitutional: Denies fevers, chills, or weakness.  ENT: Denies dysphagia, nasal discharge, ear pain or discharge.  Cardiovascular: Denies chest pain, palpitations, orthopnea, or claudication.  Respiratory: as per hpi  GI: Denies nausea/vomitting, hematochezia, melena, abd pain, or changes in appetite.  : Denies dysuria, incontinence, or hematuria.  Musculoskeletal: Denies joint pain or myalgias.  Skin/breast: Denies rashes, lumps, lesions, or discharge.  Neurologic: " Denies headache, dizziness, vertigo, or paresthesias.    Past Medical History:   Diagnosis Date    Brain bleed 1998    Osteoporosis     Pelvic fracture        Past Surgical History:   Procedure Laterality Date    COLONOSCOPY N/A 2/16/2024    Procedure: COLONOSCOPY;  Surgeon: ALKA Arguelles MD;  Location: [facility] ([address] [place] [address]);  Service: Endoscopy;  Laterality: N/A;  Ref by Dr DIANA Copeland, Newly diagnosed AF-Pending Xarelto hold, PEG, portal - PC  ok to hold Xarelto 2 days per Dr Mcknight-GT  2/6/24- precall complete / Pt confirmed Xarelto holding ins.- ERW    EYE SURGERY  2005    ingrown eye lashes BL    WISDOM TOOTH EXTRACTION         Family History   Problem Relation Name Age of Onset    Hypertension Mother      Heart disease Mother          MI age 77    Kidney failure Mother      Diabetes Mother      Parkinsonism Father      Hypertension Brother      Hypertension Brother      Diabetes Brother      No Known Problems Son      Breast cancer Neg Hx      Ovarian cancer Neg Hx      Cervical cancer Neg Hx      Endometrial cancer Neg Hx      Vaginal cancer Neg Hx      Asthma Neg Hx      Emphysema Neg Hx         Social History     Socioeconomic History    Marital status:    Occupational History    Occupation: Lab Supervisor   Tobacco Use    Smoking status: Never    Smokeless tobacco: Never   Substance and Sexual Activity    Alcohol use: Yes     Alcohol/week: 1.0 standard drink of alcohol     Types: 1 Glasses of wine per week     Comment: occasionally- 3-4 a month    Drug use: No    Sexual activity: Yes     Partners: Male   Social History Narrative    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      Research in Med School for years. - ophth and inf dz, studying antibodies.         Exercise:  Walking 3 days a week, swam in past, resistance machines' dumbbells, table tennis.          [place].  Emigrated 1988.         with CAD/CABG.  . OPP     Social Determinants of Health      Financial Resource Strain: Low Risk  (2/9/2024)    Overall Financial Resource Strain (CARDIA)     Difficulty of Paying Living Expenses: Not hard at all   Food Insecurity: No Food Insecurity (2/9/2024)    Hunger Vital Sign     Worried About Running Out of Food in the Last Year: Never true     Ran Out of Food in the Last Year: Never true   Transportation Needs: No Transportation Needs (2/9/2024)    PRAPARE - Transportation     Lack of Transportation (Medical): No     Lack of Transportation (Non-Medical): No   Physical Activity: Insufficiently Active (2/9/2024)    Exercise Vital Sign     Days of Exercise per Week: 2 days     Minutes of Exercise per Session: 50 min   Stress: No Stress Concern Present (2/9/2024)    [employer] [place] [employer] - Occupational Stress Questionnaire     Feeling of Stress : Only a little   Housing Stability: Low Risk  (2/9/2024)    Housing Stability Vital Sign     Unable to Pay for Housing in the Last Year: No     Number of Places Lived in the Last Year: 1     Unstable Housing in the Last Year: No       Review of patient's allergies indicates:  No Known Allergies      Objective:   VS (24h):   Vitals:    08/27/24 1302   BP: 103/62   Pulse: (!) 55   Temp: 97.5 °F (36.4 °C)     [unfilled]  General: Afebrile, alert, comfortable, no acute distress.   HEENT: WILVER. EOMI, no scleral icterus.  Pulmonary: Non labored,clear to auscultation A/P/L. N  Cardiac: normal S1 & S2 w/o rubs/murmurs/gallops.   Abdominal: Non-tender, non-distended.  Extremities: Moves all extremities x 4.   Skin: No jaundice, rashes, or visible lesions.   Neurological:  Alert and oriented x 4.     Labs:    Glucose   Date Value Ref Range Status   08/20/2024 88 70 - 110 mg/dL Final   06/13/2024 121 (H) 70 - 110 mg/dL Final   09/02/2023 87 70 - 110 mg/dL Final       Calcium   Date Value Ref Range Status   08/20/2024 9.9 8.7 - 10.5 mg/dL Final   06/13/2024 9.2 8.7 - 10.5 mg/dL Final   09/02/2023 9.6 8.7 - 10.5  mg/dL Final       Albumin   Date Value Ref Range Status   08/20/2024 3.6 3.5 - 5.2 g/dL Final   06/13/2024 3.1 (L) 3.5 - 5.2 g/dL Final   09/02/2023 4.0 3.5 - 5.2 g/dL Final       Total Protein   Date Value Ref Range Status   08/20/2024 8.2 6.0 - 8.4 g/dL Final   06/13/2024 7.4 6.0 - 8.4 g/dL Final   09/02/2023 8.1 6.0 - 8.4 g/dL Final       Sodium   Date Value Ref Range Status   08/20/2024 139 136 - 145 mmol/L Final   06/13/2024 133 (L) 136 - 145 mmol/L Final   09/02/2023 139 136 - 145 mmol/L Final       Potassium   Date Value Ref Range Status   08/20/2024 4.5 3.5 - 5.1 mmol/L Final   06/13/2024 3.9 3.5 - 5.1 mmol/L Final   09/02/2023 4.4 3.5 - 5.1 mmol/L Final       CO2   Date Value Ref Range Status   08/20/2024 27 23 - 29 mmol/L Final   06/13/2024 23 23 - 29 mmol/L Final   09/02/2023 26 23 - 29 mmol/L Final       Chloride   Date Value Ref Range Status   08/20/2024 105 95 - 110 mmol/L Final   06/13/2024 102 95 - 110 mmol/L Final   09/02/2023 104 95 - 110 mmol/L Final       BUN   Date Value Ref Range Status   08/20/2024 12 8 - 23 mg/dL Final   06/13/2024 9 8 - 23 mg/dL Final   09/02/2023 15 8 - 23 mg/dL Final       Creatinine   Date Value Ref Range Status   08/20/2024 0.7 0.5 - 1.4 mg/dL Final   06/13/2024 0.7 0.5 - 1.4 mg/dL Final   09/02/2023 0.7 0.5 - 1.4 mg/dL Final       Alkaline Phosphatase   Date Value Ref Range Status   08/20/2024 76 55 - 135 U/L Final   06/13/2024 68 55 - 135 U/L Final   09/02/2023 58 55 - 135 U/L Final       ALT   Date Value Ref Range Status   08/20/2024 17 10 - 44 U/L Final   06/13/2024 10 10 - 44 U/L Final   09/02/2023 12 10 - 44 U/L Final       AST   Date Value Ref Range Status   08/20/2024 20 10 - 40 U/L Final   06/13/2024 17 10 - 40 U/L Final   09/02/2023 22 10 - 40 U/L Final       Total Bilirubin   Date Value Ref Range Status   08/20/2024 0.5 0.1 - 1.0 mg/dL Final     Comment:     For infants and newborns, interpretation of results should be based  on gestational age, weight and in  agreement with clinical  observations.    Premature Infant recommended reference ranges:  Up to 24 hours.............<8.0 mg/dL  Up to 48 hours............<12.0 mg/dL  3-5 days..................<15.0 mg/dL  6-29 days.................<15.0 mg/dL     06/13/2024 0.4 0.1 - 1.0 mg/dL Final     Comment:     For infants and newborns, interpretation of results should be based  on gestational age, weight and in agreement with clinical  observations.    Premature Infant recommended reference ranges:  Up to 24 hours.............<8.0 mg/dL  Up to 48 hours............<12.0 mg/dL  3-5 days..................<15.0 mg/dL  6-29 days.................<15.0 mg/dL     09/02/2023 0.4 0.1 - 1.0 mg/dL Final     Comment:     For infants and newborns, interpretation of results should be based  on gestational age, weight and in agreement with clinical  observations.    Premature Infant recommended reference ranges:  Up to 24 hours.............<8.0 mg/dL  Up to 48 hours............<12.0 mg/dL  3-5 days..................<15.0 mg/dL  6-29 days.................<15.0 mg/dL         WBC   Date Value Ref Range Status   08/20/2024 5.58 3.90 - 12.70 K/uL Final   06/13/2024 5.86 3.90 - 12.70 K/uL Final   12/19/2023 7.71 3.90 - 12.70 K/uL Final       Hemoglobin   Date Value Ref Range Status   08/20/2024 12.0 12.0 - 16.0 g/dL Final   06/13/2024 11.7 (L) 12.0 - 16.0 g/dL Final   12/19/2023 13.3 12.0 - 16.0 g/dL Final       POC Hematocrit   Date Value Ref Range Status   12/20/2023 45 36 - 54 %PCV Final     Hematocrit   Date Value Ref Range Status   08/20/2024 39.7 37.0 - 48.5 % Final   06/13/2024 37.4 37.0 - 48.5 % Final   12/19/2023 39.9 37.0 - 48.5 % Final       MCV   Date Value Ref Range Status   08/20/2024 100 (H) 82 - 98 fL Final   06/13/2024 99 (H) 82 - 98 fL Final   12/19/2023 95 82 - 98 fL Final       Platelets   Date Value Ref Range Status   08/20/2024 248 150 - 450 K/uL Final   06/13/2024 218 150 - 450 K/uL Final   12/19/2023 269 150 - 450 K/uL  "Final       Lab Results   Component Value Date    CHOL 187 08/20/2024    CHOL 193 09/02/2023    CHOL 200 (H) 07/20/2022       Lab Results   Component Value Date    HDL 52 08/20/2024    HDL 57 09/02/2023    HDL 57 07/20/2022       Lab Results   Component Value Date    LDLCALC 122.6 08/20/2024    LDLCALC 124.6 09/02/2023    LDLCALC 127.0 07/20/2022       Lab Results   Component Value Date    TRIG 62 08/20/2024    TRIG 57 09/02/2023    TRIG 80 07/20/2022       Lab Results   Component Value Date    CHOLHDL 27.8 08/20/2024    CHOLHDL 29.5 09/02/2023    CHOLHDL 28.5 07/20/2022     No results found for: "RPR"  No results found for: "QUANTIFERON"    Medications:  Current Outpatient Medications on File Prior to Visit   Medication Sig Dispense Refill    alendronate (FOSAMAX) 70 MG tablet TAKE 1 TABLET BY MOUTH WEEKLY  WITH 8 OZ OF PLAIN WATER 30  MINUTES BEFORE FIRST FOOD, DRINK OR MEDS. STAY UPRIGHT FOR 30  MINS 12 tablet 3    CALCIUM CARBONATE (CALCIUM 600 ORAL) Take by mouth.      cholecalciferol, vitamin D3, 125 mcg (5,000 unit) Tab Take 5,000 Units by mouth once daily.      estradioL (ESTRACE) 0.01 % (0.1 mg/gram) vaginal cream 0.5 grams with applicator or dime-sized amount with finger in vagina nightly x 2 weeks, then twice a week thereafter 42.5 g 11    metoprolol tartrate (LOPRESSOR) 25 MG tablet Take 1 tablet (25 mg total) by mouth daily as needed (Palpitations). 30 tablet 0    rivaroxaban (XARELTO) 20 mg Tab Take 1 tablet (20 mg total) by mouth daily with dinner or evening meal. 90 tablet 3    flecainide (TAMBOCOR) 150 MG Tab Take 300 mg (2 tabs) by mouth as needed for AF. Limit one dose per day. (Patient not taking: Reported on 6/17/2024) 30 tablet 3     No current facility-administered medications on file prior to visit.       Antibiotics:   Antibiotics (From admission, onward)      None            HIV: No components found for: "HIV 1/2 AG/AB"  Hepatitis C IgG: No components found for: "HEPATITIS C"  Syphilis: No " "results found for: "RPR"    Hepatitis A IgG: No components found for: "HEPATITIS A IGG"  Hepatitis Bc IgG: No components found for: "HEPATITIS B CORE IGG"  Hepatitis Bs IgG:  Quantiferon: No results found for: "QUANTIFERON"  VZV IgG: No components found for: "VARICELLA IGG"    No components found for: "SEDIMENTATION RATE"  No components found for: "C-REACTIVE PROTEIN"      Microbiology x 7d:   Microbiology Results (last 7 days)       ** No results found for the last 168 hours. **            Immunization History   Administered Date(s) Administered    COVID-19, MRNA, LN-S, PF (Pfizer) (Purple Cap) 01/21/2021, 02/11/2021, 10/18/2021, 04/28/2022    COVID-19, mRNA, LNP-S, bivalent booster, PF (PFIZER OMICRON) 10/27/2022    Influenza 10/11/2018, 09/27/2019    Influenza (FLUAD) - Quadrivalent - Adjuvanted - PF *Preferred* (65+) 10/02/2023    Influenza - Quadrivalent - MDCK 09/24/2020    Influenza - Quadrivalent - PF *Preferred* (6 months and older) 10/19/2022    Influenza A (H1N1) 2009 Monovalent - IM 11/04/2009    Pneumococcal Conjugate - 13 Valent 01/10/2018    Pneumococcal Polysaccharide - 23 Valent 01/23/2019    RSVpreF (Arexvy) 08/20/2024    Tdap 02/26/2020         Reviewed records today as well as relevant labs, cultures, and imaging    Assessment:     Pulmonary MAC  Macrolide susceptible, amikacin resistant (ORTEGA 256 for both iv and inh)  Cavitary    Risk factor-   structural:  bronchiectasis    Meets ATS/IDSA guidelines for treatment (symptoms, imaging, cultures). Treatment is not worse than disease, but monitor closely for side effects and drug toxicities. Given that the patient has numerous risk factors for disease progression [including older age, low bmi (21), elevated esr (73), crp (34), low albumin (3.1), fibrocavitary disease, bronchiectasis], encouraged her to start treatment >> watchful waiting. Pt had a hard time recently with making switch to from Xarleto to coumadin. Xarelto and rifampin can't be used " together.       Extremely medically complex  Patient is high risk for infectious complications given drug resistant infection      Below are the results for a patient with a Positive QFT Test, who is 73 years old, born in [place], [facility], whose BCG status is Vaccinated age < 2 years, and who has had no contact with active TB.  The likelihood that this is a true positive test (PPV) is: 98%  The annual risk of development of active tuberculosis disease is estimated to be 0.1%.  The cumulative risk of active tuberculosis disease, up to the age of 80, is: 0.88%  If treated with INH, the probability of clinically significant drug-induced hepatitis is 5%, and the associated probability of hospitalization related to drug-induced hepatitis is 2.4%.      Plan:     Discussed importance of airway clearance. Encouraged use of Aerobika and nebulized hypertonic saline twice a day. hypertonic saline is hostile environment for mycobacteria. Recommended decreasing duration of hot showers, avoid soil and water aerosols, use fans in shower area, avoid fine mist shower heads (heavy droplets better), avoid bubbling water (hot tubs, humidifiers), flush water heater frequently to avoid biofilm buildup    Prevent reflux- avoid stomach sleeping. Sleep inclined. Famotidine/tums preferrable to PPI if needed. Stop liquids 3hr before bedtime.     Encouraged probiotics    Check AFB sputum monthly for clearance. Duration 12 months from cultures clearance (~18 months or longer)    Start azithromycin, ethambutol, clofazamine (clofaz to substitute rifampin). Counseled extensively on side effects. Clofazamine consent obtained and drug requested through [facility]. Hold off on starting antibiotics until clofazamine has been received. Then plan on starting daily azithro, ethmbutol, clofaz daily. Would make sure she is tolerating for a few weeks. Then at that point, would consider adding INH+B6 for LTBI (bcg vaccine does not explain positive IGRA and MAC  would not cause a false positive tb test).     While on ethambutol- self vision screens daily. If blurry vision lasts 2 days in a row, patient known to stop ethambutol and call eye doctor for exam and to let us know that this happened.    Qtc [id number]    Check CT chest at beginning and end of therapy       Close 1-2 month follow up requested with my partner. Pt aware that I will be out of office on leave in October.       - Will monitor drug therapy for toxicity      - The following labs were ordered:  Orders Placed This Encounter   Procedures    AFB Culture & Smear           Standing Status:   Standing     Number of Occurrences:   11     Standing Expiration Date:   11/25/2025    Comprehensive Metabolic Panel     Standing Status:   Standing     Number of Occurrences:   11     Standing Expiration Date:   11/25/2025     Order Specific Question:   Send normal result to authorizing provider's In Basket if patient is active on MyChart:     Answer:   Yes    ECG 12 lead     Standing Status:   Future     Number of Occurrences:   1     Standing Expiration Date:   8/27/2025           I have sent communication to the referring physician and/or primary care provider.     The total time for evaluation and management services performed on 8/27/24 was greater than 80 minutes.  This includes face to face time and non-face to face time preparing to see the patient (eg, review of tests), obtaining and/or reviewing separately obtained history, documenting clinical information in the electronic or other health record, independently interpreting results, and communicating results to the patient/family/caregiver, or care coordination.       Visit today included increased complexity associated with the care of the episodic problem pulmonary MAC  addressed and managing the longitudinal care of the patient due to the serious and/or complex managed problem(s) .        Viji Barrera MD, MPH  Infectious Disease

## 2024-08-29 ENCOUNTER — HOSPITAL ENCOUNTER (OUTPATIENT)
Dept: CARDIOLOGY | Facility: CLINIC | Age: 74
Discharge: HOME | End: 2024-08-29
Payer: COMMERCIAL

## 2024-08-29 DIAGNOSIS — A31.0 PULMONARY MYCOBACTERIUM AVIUM COMPLEX (MAC) INFECTION: ICD-10-CM

## 2024-08-29 LAB
OHS QRS DURATION: 78 MS
OHS QTC CALCULATION: 403 MS

## 2024-08-29 PROCEDURE — 93005 ELECTROCARDIOGRAM TRACING: CPT | Mod: S$GLB,,, | Performed by: INTERNAL MEDICINE

## 2024-08-29 PROCEDURE — 93010 ELECTROCARDIOGRAM REPORT: CPT | Mod: S$GLB,,, | Performed by: STUDENT IN AN ORGANIZED HEALTH CARE EDUCATION/TRAINING PROGRAM

## 2024-08-29 NOTE — PROGRESS NOTES
RESEARCH ENCOUNTER FOR [id number]/clofazimine: Multiple Patient Program for Lamprene (clofazimine) for the treatment of Non-Tuberculous Mycobacterial Infections    PI: Viji Barrera MD  Protocol No.: 2020.098    Luly Lora is a 74 y.o. female with non-tuberculous mycobacterial infection here for consent and screening into the Multiple Patient Program for Lamprene.    Consent:  Luly Lora was consented with current ICF for participation in the Multiple Patient Program for Lamprene for the treatment of Non-Tuberculous Mycobacterial (NTM) Infections. The patient was willing and able to sign informed consent. The consent was explained in detail. The patient was given the opportunity to ask questions. I provided my contact information to the patient. The patient was able to adequately summarize: the purpose of the study, the potential benefits/risks associated with the study and possible alternatives, and all procedures, testing, and follow-ups associated with the study. The patient signed the informed consent form. Each page of the consent form was reviewed with the patient and all questions answered satisfactorily. The patient was informed he/she does not have to participate in this study. If they do not participate, their care at [facility] will not be affected. The original consent was scanned into electronic medical records (EPIC) and filed into the subject's research study binder and the patient was given a copy of the signed informed consent.    HPI:  Luly Lora is a 74 y.o. 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     The patient is unable to access a comparable or satisfactory alternative treatment to treat the NTM infection because drug resistance or intolerances to alternatives.     Patient is ineligible for participation in any ongoing clinical trials with clofazimine treatment or as recently completed a clinical trial that has been terminated and the clinical team has determined that treatment is necessary  and there are no other feasible alternatives for the patient.    Patient is not currently being transferred from an ongoing clinical trial for clofazimine for which they are still eligible.    Thorough medication, medical history, and surgical history reviews were performed. No prohibited medications noted. Please note dates for current medications/medical/surgical history contained in the EMR may not be accurate. Dates verified with source documentation or patient report will take precedence over EMR dates.    Plan:  Luly Lora is a 74 y.o. female is cleared to proceed with screening procedures for the Multiple Patient Program for Lamprene.    Pregnancy testing ordered: not applicable - postmenopausal  ECG ordered: yes          AE Documentation    All AEs should be recorded in an Epic note  Must include the following:  Date the AE began - symptom onset not date of diagnosis  Any treatment related to the event, e.g. hospitalization, OTC and Rx medications, labs/imaging, evaluations by a medical specialist, lifestyle modifications  Description of the AE  Date of resolution  Whether AE is related to study medication/procedure * Must be done by PI/Sub-I  Severity of the AE * must be done by PI/Sub-I  AE should also be recorded on AE log in patient study binder, this is where PI/Sub-I can determine relation to study medication/procedure and rate severity  If a group of symptoms can be attributed to one diagnosis; record diagnosis on AE log, not individual symptoms.  For example, a patient complains of congestion, headache, fever, and cough starting 5/1/2020. A few days later they visited their PCP and were diagnosed with an upper respiratory infection on 5/4/2020.  You would record 'URI' on the AE log along with the date of onset of symptoms 5/1/2020.  The next month, the patient reports they were sick for around 10 days total. They report symptoms resolved 5/10/2020. This is the date to resolve the AE and any associated  treatments.  Should always record any treatments related to symptoms of the AE including OTC medicines. Include medication, dose, form, and dates.  AE stop date should be the end date for any treatments or symptoms whichever is longer. If treatments continue past the resolution of symptoms, the stop date of treatment should be the end of AE.  For example, patient above took Tylenol Cold and Sinus starting 5/2/2020 and their PCP rx Augmentin starting 5/4/2020 x 10 days. Patient states symptoms resolved on 5/10/2020 and they stopped taking Tylenol Cold and Sinus when they felt better BUT he didn't finish Augmentin until 5/14/2020. Therefore, AE stop date should be 5/14/2020.  If a patient remains on a medication for an AE indefinitely, the AE will continue to be ongoing even if symptoms improve. For example, a patient has worsening of T2DM and they are placed on a new diabetic medication. If their Hba1c improves to at or below baseline but they remain on the new diabetic medication, the AE will continue to be ongoing. You cannot resolve an AE without also resolving any medications associated with that AE.  The above scenarios should look like the following in an Epic note:  Patient reports congestion, headache, fever, and cough starting 5/1/2020. Saw PCP on 5/4/2020 diagnosed with URI. Took Tylenol Cold and Sinus every 6 hours as needed from 5/1/2020-5/10/2020. PCP prescribed Augmentin 500mg twice daily for 10 days, patient took 5/4/2020-5/14/2020. URI resolved 5/14/2020.  If PI/Sub-I is recording they can also state relation and severity in their note.  CTCAE Severity  Grade 1: Mild; asymptomatic or mild symptoms; clinical or diagnostic observations only; intervention not indicated.  Grade 2: Moderate; minimal, local or noninvasive intervention indicated; limiting age appropriate instrumental ADL*. Any AE requiring a medication should be at least Grade 2.  Grade 3: Severe or medically significant but not  immediately life-threatening; hospitalization or prolongation of hospitalization indicated; disabling; limiting self care ADL**.  Grade 4: Life-threatening consequences; urgent intervention indicated.  Grade 5: Death related to AE.  Use CTCAE Version 5 when grading AEs. Includes grading for lab values based on thresholds for increasing lab values.   https://ctep.cancer.gov/protocolDevelopment/electronic_applications/docs/CTCAE_v5_Quick_Reference_8.5x11.pdf  If PI/Sub-I is treating AE/side effects of study medication/procedure, they should document in their plan of care in Epic note.  E.g. 'Worsening GERD: Ongoing. Start omeprazole 20 mg daily. Related to study medications.'

## 2024-09-04 ENCOUNTER — TELEPHONE (OUTPATIENT)
Dept: INTERNAL MEDICINE | Facility: CLINIC | Age: 74
End: 2024-09-04
Payer: COMMERCIAL

## 2024-09-05 ENCOUNTER — INFUSION (OUTPATIENT)
Dept: INFECTIOUS DISEASES | Facility: HOSPITAL | Age: 74
End: 2024-09-05
Payer: COMMERCIAL

## 2024-09-05 ENCOUNTER — LAB VISIT (OUTPATIENT)
Dept: LAB | Facility: HOSPITAL | Age: 74
End: 2024-09-05
Attending: INTERNAL MEDICINE
Payer: COMMERCIAL

## 2024-09-05 VITALS
WEIGHT: 104.38 LBS | TEMPERATURE: 98 F | HEART RATE: 62 BPM | HEIGHT: 61 IN | SYSTOLIC BLOOD PRESSURE: 105 MMHG | RESPIRATION RATE: 18 BRPM | BODY MASS INDEX: 19.71 KG/M2 | OXYGEN SATURATION: 95 % | DIASTOLIC BLOOD PRESSURE: 52 MMHG

## 2024-09-05 DIAGNOSIS — M81.0 OSTEOPOROSIS, POST-MENOPAUSAL: Primary | ICD-10-CM

## 2024-09-05 DIAGNOSIS — A31.0 MYCOBACTERIUM AVIUM INFECTION: Primary | ICD-10-CM

## 2024-09-05 DIAGNOSIS — A31.0 PULMONARY MYCOBACTERIUM AVIUM COMPLEX (MAC) INFECTION: ICD-10-CM

## 2024-09-05 PROCEDURE — 87206 SMEAR FLUORESCENT/ACID STAI: CPT | Performed by: INTERNAL MEDICINE

## 2024-09-05 PROCEDURE — 87015 SPECIMEN INFECT AGNT CONCNTJ: CPT | Performed by: INTERNAL MEDICINE

## 2024-09-05 PROCEDURE — 63600175 PHARM REV CODE 636 W HCPCS: Performed by: INTERNAL MEDICINE

## 2024-09-05 PROCEDURE — 96365 THER/PROPH/DIAG IV INF INIT: CPT

## 2024-09-05 PROCEDURE — 87118 MYCOBACTERIC IDENTIFICATION: CPT | Performed by: INTERNAL MEDICINE

## 2024-09-05 PROCEDURE — 87116 MYCOBACTERIA CULTURE: CPT | Performed by: INTERNAL MEDICINE

## 2024-09-05 RX ORDER — HEPARIN 100 UNIT/ML
500 SYRINGE INTRAVENOUS
OUTPATIENT
Start: 2024-09-05

## 2024-09-05 RX ORDER — SODIUM CHLORIDE 0.9 % (FLUSH) 0.9 %
10 SYRINGE (ML) INJECTION
Status: DISCONTINUED | OUTPATIENT
Start: 2024-09-05 | End: 2024-09-05 | Stop reason: HOSPADM

## 2024-09-05 RX ORDER — ACETAMINOPHEN 500 MG
500 TABLET ORAL
OUTPATIENT
Start: 2024-09-05

## 2024-09-05 RX ORDER — SODIUM CHLORIDE 0.9 % (FLUSH) 0.9 %
10 SYRINGE (ML) INJECTION
OUTPATIENT
Start: 2024-09-05

## 2024-09-05 RX ORDER — ZOLEDRONIC ACID 5 MG/100ML
5 INJECTION, SOLUTION INTRAVENOUS
OUTPATIENT
Start: 2024-09-05

## 2024-09-05 RX ORDER — ZOLEDRONIC ACID 5 MG/100ML
5 INJECTION, SOLUTION INTRAVENOUS
Status: COMPLETED | OUTPATIENT
Start: 2024-09-05 | End: 2024-09-05

## 2024-09-05 RX ORDER — ACETAMINOPHEN 500 MG
500 TABLET ORAL
Status: DISCONTINUED | OUTPATIENT
Start: 2024-09-05 | End: 2024-09-05 | Stop reason: HOSPADM

## 2024-09-05 RX ADMIN — ZOLEDRONIC ACID 5 MG: 5 INJECTION, SOLUTION INTRAVENOUS at 01:09

## 2024-09-06 ENCOUNTER — PATIENT MESSAGE (OUTPATIENT)
Dept: RESEARCH | Facility: CLINIC | Age: 74
End: 2024-09-06
Payer: COMMERCIAL

## 2024-09-09 ENCOUNTER — DOCUMENTATION ONLY (OUTPATIENT)
Dept: RESEARCH | Facility: CLINIC | Age: 74
End: 2024-09-09
Payer: COMMERCIAL

## 2024-09-09 NOTE — PROGRESS NOTES
Lamprene (Clofazimine) Multiple Patient Program  Sponsor: [facility]  IRB/Protocol #: 2020.098  Principle Investigator: Dr. Viji Pressley  Site Number: [id number]     Ms. Luly Lora is enrolled in the Multiple Patient Program for Lamprene (Clofazimine) for treatment of Non-tuberculous mycobacteria (NTM). She came to ID clinic today, [id number], to  first prescription of drug that was dispensed by the [facility]. Instructed to take 100 mg (2 capsules) by mouth once a day with food. Please reach out to ID clinic when next refill needed and call if any questions or concerns.

## 2024-09-13 LAB
ACID FAST MOD KINY STN SPEC: ABNORMAL
ACID FAST MOD KINY STN SPEC: ABNORMAL
MYCOBACTERIUM SPEC QL CULT: ABNORMAL
MYCOBACTERIUM SPEC QL CULT: ABNORMAL

## 2024-10-22 ENCOUNTER — OFFICE VISIT (OUTPATIENT)
Dept: ELECTROPHYSIOLOGY | Facility: CLINIC | Age: 74
End: 2024-10-22
Payer: COMMERCIAL

## 2024-10-22 ENCOUNTER — PATIENT MESSAGE (OUTPATIENT)
Dept: RESEARCH | Facility: HOSPITAL | Age: 74
End: 2024-10-22
Payer: COMMERCIAL

## 2024-10-22 VITALS
DIASTOLIC BLOOD PRESSURE: 62 MMHG | SYSTOLIC BLOOD PRESSURE: 106 MMHG | BODY MASS INDEX: 19.68 KG/M2 | HEART RATE: 68 BPM | HEIGHT: 61 IN | WEIGHT: 104.25 LBS

## 2024-10-22 DIAGNOSIS — R04.2 HEMOPTYSIS: ICD-10-CM

## 2024-10-22 DIAGNOSIS — Z79.01 ON ANTICOAGULANT THERAPY: ICD-10-CM

## 2024-10-22 DIAGNOSIS — I48.0 PAF (PAROXYSMAL ATRIAL FIBRILLATION): Primary | ICD-10-CM

## 2024-10-22 DIAGNOSIS — I48.3 TYPICAL ATRIAL FLUTTER: ICD-10-CM

## 2024-10-22 LAB
OHS QRS DURATION: 74 MS
OHS QTC CALCULATION: 395 MS

## 2024-10-22 PROCEDURE — 3008F BODY MASS INDEX DOCD: CPT | Mod: CPTII,S$GLB,, | Performed by: NURSE PRACTITIONER

## 2024-10-22 PROCEDURE — 1126F AMNT PAIN NOTED NONE PRSNT: CPT | Mod: CPTII,S$GLB,, | Performed by: NURSE PRACTITIONER

## 2024-10-22 PROCEDURE — 3074F SYST BP LT 130 MM HG: CPT | Mod: CPTII,S$GLB,, | Performed by: NURSE PRACTITIONER

## 2024-10-22 PROCEDURE — 3288F FALL RISK ASSESSMENT DOCD: CPT | Mod: CPTII,S$GLB,, | Performed by: NURSE PRACTITIONER

## 2024-10-22 PROCEDURE — 99214 OFFICE O/P EST MOD 30 MIN: CPT | Mod: S$GLB,,, | Performed by: NURSE PRACTITIONER

## 2024-10-22 PROCEDURE — 1159F MED LIST DOCD IN RCRD: CPT | Mod: CPTII,S$GLB,, | Performed by: NURSE PRACTITIONER

## 2024-10-22 PROCEDURE — 93010 ELECTROCARDIOGRAM REPORT: CPT | Mod: S$GLB,,, | Performed by: INTERNAL MEDICINE

## 2024-10-22 PROCEDURE — 93005 ELECTROCARDIOGRAM TRACING: CPT | Mod: S$GLB,,, | Performed by: INTERNAL MEDICINE

## 2024-10-22 PROCEDURE — 1101F PT FALLS ASSESS-DOCD LE1/YR: CPT | Mod: CPTII,S$GLB,, | Performed by: NURSE PRACTITIONER

## 2024-10-22 PROCEDURE — 1160F RVW MEDS BY RX/DR IN RCRD: CPT | Mod: CPTII,S$GLB,, | Performed by: NURSE PRACTITIONER

## 2024-10-22 PROCEDURE — 99999 PR PBB SHADOW E&M-EST. PATIENT-LVL IV: CPT | Mod: PBBFAC,,, | Performed by: NURSE PRACTITIONER

## 2024-10-22 PROCEDURE — 3078F DIAST BP <80 MM HG: CPT | Mod: CPTII,S$GLB,, | Performed by: NURSE PRACTITIONER

## 2024-10-22 NOTE — PROGRESS NOTES
Ms. Lora is a patient of Dr. Mcknight and was last seen in clinic 4/5/2024.      Subjective:   Patient ID:  Luly Lora is a 74 y.o. female who presents for follow up of Atrial Fibrillation  .     HPI:    Ms. Lora is a 74 y.o. female with osteoporosis, pAF here for follow up.      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   Now occurs about 1 q month, lasting many hours.  Her watch monitor/ecg shows AF. 2 recordings made, showing AF at 60s-70s bpm.  When she has AF, she feels unwell.  Good exertion tolerance.  TSH normal     10/6/2023: ECG is NSR  Discussed AF and its basic pathophysiology, including its health implications and treatment options (rate vs. rhythm control, meds vs. procedural/device treatment) as appropriate for the patient.  Discussed standing-dose meds vs. PIP. She prefers the latter.     Discussed pill-in-the-pocket approach to AF. Gave pt instructions to present to ER if sustained AF is felt, for the first trial of that approach; Give Flecainide 300 mg PO x 1 in ED. Provided reference to NEJ article (Davon et al. N Engl J Med 2004;351:2384-91).   The emergency department will monitor the patient on telemetry. According to the literature, expected efficacy is conversion of up to 68% of patients by 4 hours after the dose, and up to 91% after 8 hours. I recommend ED-based observation until return to sinus rhythm or for 8 hours after the dose, whichever comes first. If this tactic is tolerated and effective, thereafter the patient would be able to self-direct pill-in-the-pocket treatment.        12/20/2023 ED note:Patient reports frequent bouts of paroxysmal AFib usually once a month. Over the past couple of weeks she has had 1-2 episodes but it resolved quickly and she did not take the medication or come to the hospital. Tonight around 7:00 p.m. on December 19 she began experiencing palpitations with an  abnormal heart rhythm. This prompted her visit to the ED. since arriving to the ED her palpitations has resolved. Her 12 lead EKG shows atrial fibrillation.  On reassessment at 12:40 a.m. patient is in normal sinus rhythm. I will hold off on flecainide at this time as the patient has spontaneously converted and monitoring the ED.      4/5/2024: She continues to have paroxysms of AF, some lasting hours. Went to the ED in December but converted prior to taking flecainide. ECG in [facility] confirmed AF at 92bpm.  She would rather not have to return to the ED if possible. We discussed that medication options are limited due to her resting bradycardia.  Discussed risks, benefits, and alternatives to PVI. She would like to consider. In the interim, can try very low dose lopressor if she has an episode but advised to be very cautious about her HRs and BPs after she converts back to sinus rhythm. CHADSVASc 2 on xarelto.       Update (10/22/2024):    Dxed with MAC infection. Changed to warfarin due to interaction with pulmonary treatment. Changed back to xarelto when that particular therapy completed but she remains on other treatment for her infection, which has been resistant to multiple treatments. Surgery being considered. Today she says she has been doing well from a rhythm standpoint. Continues to have some palps. Not sustained - most lasting a few minutes. Has not needed metoprolol. No exertional CP. Reports mild hemoptysis.     She is currently taking xarelto 20mg daily for stroke prophylaxis and denies significant bleeding episodes. Kidney function is stable, with a creatinine of 0.7 on 8/20/2024.     I have personally reviewed the patient's EKG today, which shows sinus rhythm at 68bpm. UT interval is 184. QRS is 74. QTc is 395.    Relevant Cardiac Test Results:     2D Echo (10/6/2023):    Left Ventricle: The left ventricle is normal in size. Normal wall thickness. There is normal systolic function with a visually  estimated ejection fraction of 60 - 65%.    Right Ventricle: Normal right ventricular cavity size. Wall thickness is normal. Systolic function is normal.    Aortic Valve: There is mild aortic valve sclerosis. There is mild aortic regurgitation.    Mitral Valve: There is mild regurgitation.    Tricuspid Valve: There is mild regurgitation.    Current Outpatient Medications   Medication Sig    alendronate (FOSAMAX) 70 MG tablet TAKE 1 TABLET BY MOUTH WEEKLY  WITH 8 OZ OF PLAIN WATER 30  MINUTES BEFORE FIRST FOOD, DRINK OR MEDS. STAY UPRIGHT FOR 30  MINS    azithromycin (ZITHROMAX) 500 MG tablet Take 1 tablet (500 mg total) by mouth once daily.    CALCIUM CARBONATE (CALCIUM 600 ORAL) Take by mouth.    cholecalciferol, vitamin D3, 125 mcg (5,000 unit) Tab Take 5,000 Units by mouth once daily.    estradioL (ESTRACE) 0.01 % (0.1 mg/gram) vaginal cream 0.5 grams with applicator or dime-sized amount with finger in vagina nightly x 2 weeks, then twice a week thereafter    ethambutoL (MYAMBUTOL) 400 MG Tab Take 2 tablets (800 mg total) by mouth once daily.    INV clofazimine capsule Take 2 capsules (100 mg total) by mouth once daily with meals. Investigational Medication. Patient Study Req ID# [id number].    metoprolol tartrate (LOPRESSOR) 25 MG tablet Take 1 tablet (25 mg total) by mouth daily as needed (Palpitations).    rivaroxaban (XARELTO) 20 mg Tab Take 1 tablet (20 mg total) by mouth daily with dinner or evening meal.     No current facility-administered medications for this visit.       Review of Systems   Constitutional: Negative for malaise/fatigue.   Cardiovascular:  Positive for dyspnea on exertion, irregular heartbeat and palpitations (minimal). Negative for chest pain and leg swelling.   Respiratory:  Positive for cough, hemoptysis and shortness of breath.    Hematologic/Lymphatic: Negative for bleeding problem.   Skin:  Negative for rash.   Musculoskeletal:  Negative for myalgias.   Gastrointestinal:  Negative for  "hematemesis, hematochezia and nausea.   Genitourinary:  Negative for hematuria.   Neurological:  Negative for light-headedness.   Psychiatric/Behavioral:  Negative for altered mental status.    Allergic/Immunologic: Negative for persistent infections.       Objective:          /62   Pulse 68   Ht 5' 1" (1.549 m)   Wt 47.3 kg (104 lb 4.4 oz)   BMI 19.70 kg/m²     Physical Exam  Vitals and nursing note reviewed.   Constitutional:       Appearance: Normal appearance. She is well-developed.   HENT:      Head: Normocephalic.      Nose: Nose normal.   Eyes:      Pupils: Pupils are equal, round, and reactive to light.   Cardiovascular:      Rate and Rhythm: Normal rate and regular rhythm.   Pulmonary:      Effort: No respiratory distress.   Musculoskeletal:         General: Normal range of motion.   Skin:     General: Skin is warm and dry.      Findings: No erythema.   Neurological:      Mental Status: She is alert and oriented to person, place, and time.   Psychiatric:         Speech: Speech normal.         Behavior: Behavior normal.           Lab Results   Component Value Date     08/20/2024    K 4.5 08/20/2024    BUN 12 08/20/2024    CREATININE 0.7 08/20/2024    ALT 17 08/20/2024    AST 20 08/20/2024    HGB 12.0 08/20/2024    HCT 39.7 08/20/2024    HCT 45 12/20/2023    TSH 0.751 09/02/2023    LDLCALC 122.6 08/20/2024       Recent Labs   Lab 07/18/24  1044 07/19/24  0923 07/22/24  1018 07/24/24  0723   INR 7.7 HH 4.9 H 1.5 H 1.9 H       Assessment:     1. PAF (paroxysmal atrial fibrillation)    2. On anticoagulant therapy    3. Typical atrial flutter    4. Hemoptysis      Plan:     In summary, Ms. Lora is a 74 y.o. female with osteoporosis, pAF here for follow up.   She is stable from a rhythm standpoint, with minimal palpitations and no sustained AF identified since last clinic visit. Has not needed PRN BB for palps.  CHADSVASc 2 and she is on xarelto. Unfortunately she has mild hemoptysis secondary to " chronic MAC infection and pulmonologist has referred her back to EP clinic to discuss risks vs benefits of continuing xarelto.  She might be a good candidate for LAAO. Consider ablation prior for definitive AF treatment.     EP referral to reestablish care and consider LAAO (+/- PVI prior)  Continue current meds for now  RTC as scheduled      *A copy of this note has been sent to Dr. Mcknight*    Follow up as scheduled.    ------------------------------------------------------------------    JOHANA Mann, NP-C  Cardiac Electrophysiology

## 2024-10-25 ENCOUNTER — OFFICE VISIT (OUTPATIENT)
Dept: UROGYNECOLOGY | Facility: CLINIC | Age: 74
End: 2024-10-25
Payer: COMMERCIAL

## 2024-10-25 VITALS
HEART RATE: 62 BPM | BODY MASS INDEX: 20.44 KG/M2 | SYSTOLIC BLOOD PRESSURE: 104 MMHG | DIASTOLIC BLOOD PRESSURE: 52 MMHG | HEIGHT: 61 IN | WEIGHT: 108.25 LBS

## 2024-10-25 DIAGNOSIS — N95.2 VAGINAL ATROPHY: ICD-10-CM

## 2024-10-25 DIAGNOSIS — N81.4 UTEROVAGINAL PROLAPSE: Primary | ICD-10-CM

## 2024-10-25 DIAGNOSIS — Z46.89 PESSARY MAINTENANCE: ICD-10-CM

## 2024-10-25 PROCEDURE — 99999 PR PBB SHADOW E&M-EST. PATIENT-LVL III: CPT | Mod: PBBFAC,,, | Performed by: NURSE PRACTITIONER

## 2024-10-25 NOTE — PROGRESS NOTES
Urogyn follow up  10/25/2024  .  [facility]  [address] [id number]    Luly Lora  [id number]  1950      Luly Lora is a 74 y.o. here for a urogyn follow up for pessary check.    Last HPI from 01/23/2018     HPI:       Ohs Peq [id number]      Question 1/21/2018  8:07 PM CST   Do you...     Usually experience pressure in the lower abdomen? Symptoms not present   Usually experience heaviness or dullness in the pelvic area? Symptoms not present   Usually have a bulge or something falling out that you can see or feel in your vaginal area? Symptoms present and they bother me quite a bit   Ever have to push on the vagina or around the rectum to complete a bowel movement? Symptoms not present   Usually experience a feeling of incomplete bladder emptying? Symptoms present and they bother me somewhat   Ever have to push up on a bulge in the vaginal area with your fingers to start or complete urination? Symptoms not present   Do you...     Feel you need to strain too hard to have a bowel movement? Symptoms not present   Feel you have not completely emptied your bowels at the end of a bowel movement?  Symptoms present and they bother me somewhat   Usually lose stool beyond your control if your stool is well formed? Symptoms not present   Usually lose stool beyond your control if your stool is loose? Symptoms present and they bother me somewhat   Usually lose gas from your rectum beyond your control? Symptoms not present   Usually have pain when you pass your stool? Symptoms not present   Experience a strong sense of urgency and have to rush to the bathroom to have a bowel movement? Symptoms not present   Does part of your bowel ever pass through the rectum and bulge outside during or after a bowel movement? Symptoms present and they bother me somewhat   Do you...      Usually experience frequent urination? Symptoms present and they bother me somewhat   Usually experience urine leakage  associated with a feeling of urgency, that is, a strong sensation of needing to go to the bathroom? Symptoms present and they bother me somewhat   Usually experience urine leakage related to coughing, sneezing or laughing? Symptoms not present   Usually experience small amounts of urine leakage (that is, drops)? Symptoms present and they bother me somewhat   Usually experience difficulty emptying your bladder? Symptoms present and they bother me somewhat   Usually experience pain or discomfort in the lower abdomen or genital region? Symptoms present and they bother me somewhat   POPDI  (range: 0 - 100) 25   CRADI (range: 0 - 100) 18.75   IGNACIO (range: 0 - 100) 41.66   TOTAL SCORE  (range: 0 - 300) 85.41   Ohs Peq Urogyn Hpi      Question 1/21/2018  8:21 PM CST   General Urogynecology: Are you experiencing the following?     Dysuria (painful urination) No   Nocturia:  waking up at night to empty your bladder  Yes   If you answered yes to the previous question, how many times does this happen per night? 1-2   Enuresis (urine loss during sleep) No   Dribbling urine after you urinate Yes   Hematuria (urine appears red) No   Type of stream Interrupted   Urinary frequency: How often a day are you going to the bathroom per day?  Less than 10   Urinary Tract Infections: How many Urinary Tract Infections have you had in the past year? I have not had a UTI in the past year   If you have had a UTI in the past year, what treatments have you had so far?  I have not had a UTI in the past year   Urinary Incontinence (General): Are you experiencing the following?     Past consultation for incontinence: Have you ever seen someone for the evaluation of incontinence? No   If you answered yes to the previous question, please select all the therapies you have tried.  N/a- I answered no to the previous question   Please note the effectiveness of the therapies.     Need to wear protection to keep clothes dry  No   If you answered yes to the  "previous question, please kuldeep the protection you use.  N/a- I answered no to the previous question   If you wear protection, how much wetness is typically on each pad? N/a- I do not wear protection   If you wear protection, how often do you have to change per day, if applicable?      Stress Symptoms: Are you experiencing the following?     Leakage of urine with cough, laugh and/or sneeze No   If you answered yes to the previous question, what is the frequency in days, weeks and/or months? Never   Leakage of urine with sex No   Leakage of urine with bending/ lifting No   Leakage of urine with briskly walking or jogging No   If you lose urine for any other reason not previously mentioned, please note it below, if applicable.      Urge Symptoms: Are you experiencing the following?     Urgency ("got to go" feeling) Yes   Urge: How frequently do you feel an urge to urinate (feeling like you "gotta go" to the bathroom and can't wait) Several times a week   Do you experience a leakage of urine when you have a feeling of urgency?  No   Leakage of urine when unaware No   Past use of anticholinergics (medications used to treat overactive bladder) No   If you answered yes to the previous question, please kuldeep the anticholinergics you have used:      Have you ever used Mirbetriq (aka Mirabegron)?  No   Prolapse Symptoms: Are you experiencing any of the following?      Falling out/ Bulging/ Heaviness in the vagina (past opening) x years; wears tight underwear Yes   Vaginal/ Abdominal Pain/ Pressure Yes   Need to strain/ Push to void No   Need to wait on the toilet before you void No   Unusual position to urinate (using your hands to push back the vaginal bulge) No   Sensation of incomplete emptying Yes--has to PV/DV to help   Past use of pessary device No   If you answered yes to the previous question, please list the devices you have used below.      Bowel Symptoms: Are you experiencing any of the following?     Constipation " No   Diarrhea  No   Hematochezia (bloody stool) No   Incomplete evacuation of stool Yes   Involuntary loss of formed stool No   Fecal smearing/urgency Yes   Involuntary loss of gas No   Vaginal Symptoms: Are you experiencing any of the following?      Abnormal vaginal bleeding  No   Vaginal dryness Yes   Sexually active  Yes   Dyspareunia (painful intercourse) Yes   Estrogen use  No   Ohs Peq Pelvic Pain Urogyn      Question 1/21/2018  8:22 PM CST   Are you experiencing pelvic pain?  No      Patient Hx             02/14/2024  1)  UI:  (--) BERNARD   (--) UUI    (+) pantyliner:  Daytime frequency: Q 2-3 hours.  Nocturia: Yes: 2-3/night.   (--) dysuria,  (--) hematuria,  (--) frequent UTIs.  (+) complete bladder emptying.      2)  POP:  Absent with pessary in place.  Symptoms:(--)    (--) vaginal bleeding. (+) vaginal discharge--pink tinged. More with certain movements.   (+)/(--) sexually active--unable to have intercourse with pessary in place.  (+) dyspareunia.   (--)  Vaginal dryness.  (--) vaginal estrogen use. Tried estrogen cream but had trouble getting in.    --cannot remove pessary independently  --last removal 5 months ago.  --initial POP-Q (2018):  Aa 0; Ba 0; C +2; Ap 0; Bp 0; D -6.  Genital hiatus 3, perineal body 2, total vaginal length 11-12.      3)  BM:  (--) constipation/straining.  (--) chronic diarrhea. Consistency is sticky.  (--) hematochezia.  (--) fecal incontinence  (+) fecal smearing/urgency--sometimes sudden, without reason.  (--) incomplete evacuation.       4) pessary:  Denies pain or bleeding.  Scant pink discharge.  Using #3 ring with support.  Independent in use.          Changes from last visit: (last visit 07/25/2024)  1)  Stage 3 uterine prolapse:  --using  #3 ring + support   --denies pain or dischage  --scant pink discharge on pad 2-3 times/ week     2)  Vaginal atrophy (dryness):     --using vaginal estrogen cream                3)  Urinary urgency/post-void dribbling:  --rare UUI--  using 1-2 liner pads with minimal wetness  --voiding every 2 hours during the day and 2-3/ night     4)Fecal smearing:  --improved since not eating dairy  --not taking fiber          Past Medical History:   Diagnosis Date    Brain bleed 1998    Osteoporosis     Pelvic fracture        Past Surgical History:   Procedure Laterality Date    COLONOSCOPY N/A 2/16/2024    Procedure: COLONOSCOPY;  Surgeon: ALKA Arguelles MD;  [facility] ([address] [place] [address]);  Service: Endoscopy;  Laterality: N/A;  Ref by Dr DIANA Copeland, Newly diagnosed AF-Pending Xarelto hold, PEG, portal - PC  ok to hold Xarelto 2 days per Dr Mcknight-GT  2/6/24- precall complete / Pt confirmed Xarelto holding ins.- ERW    EYE SURGERY  2005    ingrown eye lashes BL    WISDOM TOOTH EXTRACTION         Family History   Problem Relation Name Age of Onset    Hypertension Mother      Heart disease Mother          MI age 77    Kidney failure Mother      Diabetes Mother      Parkinsonism Father      Hypertension Brother      Hypertension Brother      Diabetes Brother      No Known Problems Son      Breast cancer Neg Hx      Ovarian cancer Neg Hx      Cervical cancer Neg Hx      Endometrial cancer Neg Hx      Vaginal cancer Neg Hx      Asthma Neg Hx      Emphysema Neg Hx         Social History     Socioeconomic History    Marital status:    Occupational History    Occupation: Lab Supervisor   Tobacco Use    Smoking status: Never    Smokeless tobacco: Never   Substance and Sexual Activity    Alcohol use: Yes     Alcohol/week: 1.0 standard drink of alcohol     Types: 1 Glasses of wine per week     Comment: occasionally- 3-4 a month    Drug use: No    Sexual activity: Yes     Partners: Male   Social History Narrative    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      Research in Med School for years. - ophth and inf dz, studying antibodies.         Exercise:  Walking 3 days a week, swam in past, resistance machines' dumbbells, table  tennis.          Chinese.  Emigrated 1988.         with CAD/CABG.  . OPP     Social Drivers of Health     Financial Resource Strain: Low Risk  (2/9/2024)    Overall Financial Resource Strain (CARDIA)     Difficulty of Paying Living Expenses: Not hard at all   Food Insecurity: Unknown (2/9/2024)    Hunger Vital Sign     Worried About Running Out of Food in the Last Year: Never true   Transportation Needs: No Transportation Needs (2/9/2024)    PRAPARE - Transportation     Lack of Transportation (Medical): No     Lack of Transportation (Non-Medical): No   Physical Activity: Insufficiently Active (2/9/2024)    Exercise Vital Sign     Days of Exercise per Week: 2 days     Minutes of Exercise per Session: 50 min   Stress: No Stress Concern Present (2/9/2024)    [place] [employer] - Occupational Stress Questionnaire     Feeling of Stress : Only a little   Housing Stability: Low Risk  (2/9/2024)    Housing Stability Vital Sign     Unable to Pay for Housing in the Last Year: No     Number of Places Lived in the Last Year: 1     Unstable Housing in the Last Year: No       Current Outpatient Medications   Medication Sig Dispense Refill    alendronate (FOSAMAX) 70 MG tablet TAKE 1 TABLET BY MOUTH WEEKLY  WITH 8 OZ OF PLAIN WATER 30  MINUTES BEFORE FIRST FOOD, DRINK OR MEDS. STAY UPRIGHT FOR 30  MINS 12 tablet 3    azithromycin (ZITHROMAX) 500 MG tablet Take 1 tablet (500 mg total) by mouth once daily. 30 tablet 5    CALCIUM CARBONATE (CALCIUM 600 ORAL) Take by mouth.      cholecalciferol, vitamin D3, 125 mcg (5,000 unit) Tab Take 5,000 Units by mouth once daily.      estradioL (ESTRACE) 0.01 % (0.1 mg/gram) vaginal cream 0.5 grams with applicator or dime-sized amount with finger in vagina nightly x 2 weeks, then twice a week thereafter 42.5 g 11    ethambutoL (MYAMBUTOL) 400 MG Tab Take 2 tablets (800 mg total) by mouth once daily. 60 tablet 5    INV clofazimine capsule Take 2 capsules  "(100 mg total) by mouth once daily with meals. Investigational Medication. Patient Study Req ID# [id number]. 200 capsule 0    metoprolol tartrate (LOPRESSOR) 25 MG tablet Take 1 tablet (25 mg total) by mouth daily as needed (Palpitations). 30 tablet 0    rivaroxaban (XARELTO) 20 mg Tab Take 1 tablet (20 mg total) by mouth daily with dinner or evening meal. 90 tablet 3     No current facility-administered medications for this visit.       Review of patient's allergies indicates:  No Known Allergies    Well woman:  Pap test: 2016, normal.  History of abnormal paps: No.  History of STIs:  No  Mammogram: Date of last: 7/2024.  Result: Normal  Colonoscopy: Date of last: 2010.  Result:  normal.  Repeat due:  2020.  Scheduled for Fri 2-.  DEXA:  Date of last: 2022.  Result:  Osteoporosis, on meds.  Repeat due:  2022.    ROS:  As per HPI.      Exam  BP (!) 104/52 (BP Location: Left arm, Patient Position: Sitting)   Pulse 62   Ht 5' 1" (1.549 m)   Wt 49.1 kg (108 lb 3.9 oz)   BMI 20.45 kg/m²   General: alert and oriented, no acute distress  Respiratory: normal respiratory effort  Abd: soft, non-tender, non-distended    Pelvic  Ext. Genitalia: normal external genitalia. Normal bartholin's and skeens glands  Vagina: + atrophy. Normal vaginal mucosa without lesions. No discharge noted.   Non-tender bladder base without palpable mass.  #3 ring with support in palce  Cervix: no lesions  Uterus:  uterus is normal size, shape, consistency and nontender   Urethra: no masses or tenderness  Urethral meatus: no lesions, caruncle or prolapse.    Pessary removed without difficulty. Washed with soap and water. Reinserted without difficulty.       Impression  1. Uterovaginal prolapse        2. Vaginal atrophy        3. Pessary maintenance            We reviewed the above issues and discussed options for short-term versus long-term management of her problems.   Plan:   1)  Stage 3 uterine prolapse:  --continue pessary: #3 ring + " support   PESSARY CARE: Remove pessary as frequently as nightly and as infrequently as every 2-3 weeks.  Remove before intercourse.  May need to remove before bowel movements if straining dislodges.   Wash with soap and water (no ).  Replace using small amount of water-based lubricant (like KY jelly) at end being introduced into vagina.    --having more trouble removing/replacing independently              --RTC Q3-4 months for pessary checks for now     --if tires of pessary use and is done with working (can't take off time for surgery right now):  --surgical option: vaginal hysterectomy, uterosacral suspension, anterior/posterior repair  --would need ultrasound/bladder testing beforehand  --would need plan from PCP or cards to bridge off/back on xarelto and if needs injections while off (usually give heparin 5000 U SQ preop, then Q8h periop)  --would need clearance per PCP (Min) + labs (CBC, CMP, T&S)/EKG     2)  Vaginal atrophy (dryness):     --use Vaginal estrogen:  --Use 0.5 grams of estrogen cream in vagina with applicator or dime-sized amount with finger (as far as can reach internally) nightly x 2 weeks, then twice a week thereafter.  You can also apply a dime-sized amount with your finger around the vaginal opening and inner lips at same frequency.               --try to get finger above or beneath pessary ring                          --Vaginal estrogen may help to decrease pain related to dryness with intercourse and urinary symptoms (urgency/frequency/UTIs) around menopause.                 3)  Urinary urgency/post-void dribbling:  --continue pessary   --Empty bladder every 3 hours.  Empty well: wait a minute, lean forward on toilet.    --Avoid dietary irritants (see sheet).  Keep diary x 3-5 days to determine your irritants.  --KEGELS: do 10 in AM and 10 in PM, holding each x 10 seconds.  When you feel urge to go, STOP, KEGEL, and when urge has passed, then go to bathroom.  Consider PT in  future.    --URGE: consider medication in future.  Takes 2-4 weeks to see if will have effect.  For dry mouth: get sour, sugar free lozenge or gum.       4) Elevated PVR:  --PVR improved with pessary and on last check     5)  Fecal smearing:  --hydrate well  --avoid dietary triggers     6)  RTC 3 months for pc      I spent a total of 20 minutes on the day of the visit.  This includes face to face time and non-face to face time preparing to see the patient (eg, review of tests), obtaining and/or reviewing separately obtained history, documenting clinical information in the electronic or other health record, independently interpreting results and communicating results to the patient/family/caregiver, or care coordinator.       Farheen Hopkins, JAYNEP-BC  [facility]  Division of Female Pelvic Medicine and Reconstructive Surgery  Department of Obstetrics & Gynecology

## 2024-10-25 NOTE — PATIENT INSTRUCTIONS
1)  Stage 3 uterine prolapse:  --continue pessary: #3 ring + support   PESSARY CARE: Remove pessary as frequently as nightly and as infrequently as every 2-3 weeks.  Remove before intercourse.  May need to remove before bowel movements if straining dislodges.   Wash with soap and water (no ).  Replace using small amount of water-based lubricant (like KY jelly) at end being introduced into vagina.    --having more trouble removing/replacing independently              --RTC Q3-4 months for pessary checks for now     --if tires of pessary use and is done with working (can't take off time for surgery right now):  --surgical option: vaginal hysterectomy, uterosacral suspension, anterior/posterior repair  --would need ultrasound/bladder testing beforehand  --would need plan from PCP or cards to bridge off/back on xarelto and if needs injections while off (usually give heparin 5000 U SQ preop, then Q8h periop)  --would need clearance per PCP (Min) + labs (CBC, CMP, T&S)/EKG     2)  Vaginal atrophy (dryness):     --use Vaginal estrogen:  --Use 0.5 grams of estrogen cream in vagina with applicator or dime-sized amount with finger (as far as can reach internally) nightly x 2 weeks, then twice a week thereafter.  You can also apply a dime-sized amount with your finger around the vaginal opening and inner lips at same frequency.               --try to get finger above or beneath pessary ring                          --Vaginal estrogen may help to decrease pain related to dryness with intercourse and urinary symptoms (urgency/frequency/UTIs) around menopause.                 3)  Urinary urgency/post-void dribbling:  --trial of pessary   --Empty bladder every 3 hours.  Empty well: wait a minute, lean forward on toilet.    --Avoid dietary irritants (see sheet).  Keep diary x 3-5 days to determine your irritants.  --KEGELS: do 10 in AM and 10 in PM, holding each x 10 seconds.  When you feel urge to go, STOP, KEGEL,  and when urge has passed, then go to bathroom.  Consider PT in future.    --URGE: consider medication in future.  Takes 2-4 weeks to see if will have effect.  For dry mouth: get sour, sugar free lozenge or gum.       4) Elevated PVR:  --PVR improved with pessary and on last check     5)  Fecal smearing:  --hydrate well  --avoid dietary triggers     6)  RTC 3 months for pc

## 2024-11-06 DIAGNOSIS — M81.0 OSTEOPOROSIS, POST-MENOPAUSAL: ICD-10-CM

## 2024-11-07 RX ORDER — ALENDRONATE SODIUM 70 MG/1
TABLET ORAL
Qty: 12 TABLET | Refills: 3 | Status: SHIPPED | OUTPATIENT
Start: 2024-11-07

## 2024-11-07 NOTE — TELEPHONE ENCOUNTER
Care Due:                  Date            Visit Type   Department     Provider  --------------------------------------------------------------------------------                                MYCHART                              ANNUAL                              CHECKUP/PHY  [facility] INTERNAL  Last Visit: 08-      S            TIKI HERNANDEZ  Next Visit: None Scheduled  None         None Found                                                            Last  Test          Frequency    Reason                     Performed    Due Date  --------------------------------------------------------------------------------    Mg Level....  12 months..  alendronate..............  Not Found    Overdue    Phosphate...  12 months..  alendronate..............  Not Found    Overdue    [employer] Embedded Care Due Messages. Reference number: [id number].   11/06/2024 9:06:10 PM CST

## 2024-11-07 NOTE — TELEPHONE ENCOUNTER
Refill Routing Note   Medication(s) are not appropriate for processing by [facility] for the following reason(s):        Outside of protocol    [facility] action(s):  Route             Appointments  past 12m or future 3m with PCP    Date Provider   Last Visit   8/20/2024 Elma Copeland MD   Next Visit   Visit date not found Elma Copeland MD   ED visits in past 90 days: 0        Note composed:8:55 AM 11/07/2024

## 2024-12-05 ENCOUNTER — PATIENT MESSAGE (OUTPATIENT)
Dept: RESEARCH | Facility: CLINIC | Age: 74
End: 2024-12-05
Payer: COMMERCIAL

## 2024-12-13 ENCOUNTER — DOCUMENTATION ONLY (OUTPATIENT)
Dept: RESEARCH | Facility: CLINIC | Age: 74
End: 2024-12-13
Payer: COMMERCIAL

## 2024-12-13 NOTE — PROGRESS NOTES
Lamprene (Clofazimine) Multiple Patient Program  Sponsor: [facility]  IRB/Protocol #: 2020.098  Principle Investigator: Dr. Viji Barrera  Site Number: [id number]     Ms. Luly Lora is enrolled in the Multiple Patient Program for Lamprene (Clofazimine) for treatment of Non-tuberculous mycobacteria (NTM). She was contacted to inquire if refill of drug was needed.  stated that she has transferred her care for treatment of MAC to Dr. Ambriz at [facility]. Patient will be discontinued from the program at [facility]. Discontinuation form sent to [facility].

## 2024-12-19 ENCOUNTER — OFFICE VISIT (OUTPATIENT)
Dept: INTERNAL MEDICINE | Facility: CLINIC | Age: 74
End: 2024-12-19
Payer: COMMERCIAL

## 2024-12-19 ENCOUNTER — IMMUNIZATION (OUTPATIENT)
Dept: INTERNAL MEDICINE | Facility: CLINIC | Age: 74
End: 2024-12-19
Payer: COMMERCIAL

## 2024-12-19 VITALS
HEART RATE: 63 BPM | BODY MASS INDEX: 19.52 KG/M2 | HEIGHT: 61 IN | OXYGEN SATURATION: 99 % | DIASTOLIC BLOOD PRESSURE: 72 MMHG | WEIGHT: 103.38 LBS | SYSTOLIC BLOOD PRESSURE: 120 MMHG

## 2024-12-19 DIAGNOSIS — H54.7 VISION IMPAIRMENT: Primary | ICD-10-CM

## 2024-12-19 DIAGNOSIS — Z23 NEED FOR VACCINATION: Primary | ICD-10-CM

## 2024-12-19 DIAGNOSIS — Z79.899 HIGH RISK MEDICATION USE: ICD-10-CM

## 2024-12-19 DIAGNOSIS — A31.0 PULMONARY MYCOBACTERIUM AVIUM COMPLEX (MAC) INFECTION: ICD-10-CM

## 2024-12-19 DIAGNOSIS — R04.2 HEMOPTYSIS: ICD-10-CM

## 2024-12-19 DIAGNOSIS — I48.0 PAF (PAROXYSMAL ATRIAL FIBRILLATION): ICD-10-CM

## 2024-12-19 DIAGNOSIS — H26.9 CATARACT OF BOTH EYES, UNSPECIFIED CATARACT TYPE: ICD-10-CM

## 2024-12-19 PROCEDURE — 99999 PR PBB SHADOW E&M-EST. PATIENT-LVL IV: CPT | Mod: PBBFAC,,, | Performed by: INTERNAL MEDICINE

## 2024-12-19 PROCEDURE — 1101F PT FALLS ASSESS-DOCD LE1/YR: CPT | Mod: CPTII,S$GLB,, | Performed by: INTERNAL MEDICINE

## 2024-12-19 PROCEDURE — 3074F SYST BP LT 130 MM HG: CPT | Mod: CPTII,S$GLB,, | Performed by: INTERNAL MEDICINE

## 2024-12-19 PROCEDURE — 3008F BODY MASS INDEX DOCD: CPT | Mod: CPTII,S$GLB,, | Performed by: INTERNAL MEDICINE

## 2024-12-19 PROCEDURE — 3288F FALL RISK ASSESSMENT DOCD: CPT | Mod: CPTII,S$GLB,, | Performed by: INTERNAL MEDICINE

## 2024-12-19 PROCEDURE — G2211 COMPLEX E/M VISIT ADD ON: HCPCS | Mod: S$GLB,,, | Performed by: INTERNAL MEDICINE

## 2024-12-19 PROCEDURE — 3078F DIAST BP <80 MM HG: CPT | Mod: CPTII,S$GLB,, | Performed by: INTERNAL MEDICINE

## 2024-12-19 PROCEDURE — 90480 ADMN SARSCOV2 VAC 1/ONLY CMP: CPT | Mod: S$GLB,,, | Performed by: INTERNAL MEDICINE

## 2024-12-19 PROCEDURE — 99214 OFFICE O/P EST MOD 30 MIN: CPT | Mod: S$GLB,,, | Performed by: INTERNAL MEDICINE

## 2024-12-19 PROCEDURE — 91320 SARSCV2 VAC 30MCG TRS-SUC IM: CPT | Mod: S$GLB,,, | Performed by: INTERNAL MEDICINE

## 2024-12-19 PROCEDURE — 1159F MED LIST DOCD IN RCRD: CPT | Mod: CPTII,S$GLB,, | Performed by: INTERNAL MEDICINE

## 2024-12-19 NOTE — PROGRESS NOTES
Subjective     Patient ID: Luly Lora is a 74 y.o. female.    Chief Complaint: Follow-up    HPI  Concerned about potential side effects of anitbiotics for MAC.  MAC with multiple resistances.  She was referred to [facility] by [facility] pulmonologist.      R vision poor.    Driving more problematic.  Not driving in dark.    Dr Ambriz referred her to an ophthalmologist at [facility] , to rule out Ethambutol effect, but appt never made.  She has known cataracts , so not sure of etiology of vision loss.are to blame.  She was previously followed by  ophtho         Hemoptysis in November.  So stopped xeralto, which she was taking for a fib.  She is considering Watchman Procedure.                   Review of Systems   Constitutional:  Negative for activity change and unexpected weight change.   HENT:  Negative for hearing loss, rhinorrhea and trouble swallowing.    Eyes:  Positive for visual disturbance. Negative for discharge.   Respiratory:  Negative for chest tightness and wheezing.    Cardiovascular:  Negative for chest pain and palpitations.   Gastrointestinal:  Negative for blood in stool, constipation, diarrhea and vomiting.   Endocrine: Negative for polydipsia and polyuria.   Genitourinary:  Negative for difficulty urinating, dysuria, hematuria and menstrual problem.   Musculoskeletal:  Negative for arthralgias, joint swelling and neck pain.   Neurological:  Negative for weakness and headaches.   Psychiatric/Behavioral:  Negative for confusion and dysphoric mood.           Objective     Physical Exam  Constitutional:       General: She is not in acute distress.     Appearance: She is not ill-appearing, toxic-appearing or diaphoretic.   Neurological:      General: No focal deficit present.      Mental Status: She is alert and oriented to person, place, and time.   Psychiatric:         Mood and Affect: Mood normal.         Behavior: Behavior normal.         Thought Content: Thought content normal.             Assessment and Plan     1. Vision impairment  -     Ambulatory referral/consult to Ophthalmology; Future; Expected date: 12/26/2024    2. High risk medication use  -     Ambulatory referral/consult to Ophthalmology; Future; Expected date: 12/26/2024    3. Cataract of both eyes, unspecified cataract type  -     Ambulatory referral/consult to Ophthalmology; Future; Expected date: 12/26/2024    4. Pulmonary Mycobacterium avium complex (MAC) infection    5. Hemoptysis    6. PAF (paroxysmal atrial fibrillation)        Upcoming cards apt to discuss Watchman.  She has stopped xarelto.  Encouraged ot proceed with eval at [facility] now    No follow-ups on file.

## 2025-01-13 NOTE — PROGRESS NOTES
Subjective   Patient ID:  Luly Lora is a 74 y.o. female who presents for follow-up of Atrial Fibrillation      74 yoF here for AF management. She was diagnosed with AF by Dr Mcknight 10/23. She was on metoprolol and rivaroxaban. She has developed recurrence of arrhythmia as well as hemoptysis. Her hemoptysis is likely due to her underlying Mycobacterium infection. She is due for bronchial artery embolization in the near future. She was scheduled for LAAO 9/9/24 but had her case cancelled due to GI issues requiring EGD.     CHADSVASC of 3  HASBLED of 3    Echo 10/23:  ·  Left Ventricle: The left ventricle is normal in size. Normal wall thickness. There is normal systolic function with a visually estimated ejection fraction of 60 - 65%.  ·  Right Ventricle: Normal right ventricular cavity size. Wall thickness is normal. Systolic function is normal.  ·  Aortic Valve: There is mild aortic valve sclerosis. There is mild aortic regurgitation.  ·  Mitral Valve: There is mild regurgitation.  ·  Tricuspid Valve: There is mild regurgitation.    My interpretation of the ECG is:    Past Medical History:  1998: Brain bleed  No date: Osteoporosis  No date: Pelvic fracture    Past Surgical History:  2/16/2024: COLONOSCOPY; N/A      Comment:  Procedure: COLONOSCOPY;  Surgeon: ALKA Arguelles MD;  Location: [facility] ([address] [place] [address]);  Service: Endoscopy;                Laterality: N/A;  Ref by Dr DIANA Copeland, Newly diagnosed                AF-Pending Xarelto hold, PEG, portal - PCok to hold                Xarelto 2 days per Dr Mcknight-GT2/6/24- precall complete                / Pt confirmed Xarelto holding ins.- ERW  2005: EYE SURGERY      Comment:  ingrown eye lashes BL  No date: WISDOM TOOTH EXTRACTION    Social History    Socioeconomic History      Marital status:     Occupational History      Occupation: Lab Supervisor    Tobacco Use      Smoking status: Never      Smokeless tobacco: Never    Substance and Sexual  Activity      Alcohol use: Yes        Alcohol/week: 1.0 standard drink of alcohol        Types: 1 Glasses of wine per week        Comment: occasionally- 3-4 a month      Drug use: No      Sexual activity: Yes        Partners: Male    Social History Narrative      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        Research in Med School for years. - ophth and inf dz, studying antibodies.             Exercise:  Walking 3 days a week, swam in past, resistance machines' dumbbells, table tennis.              Chinese.  Emigrated 1988.             with CAD/CABG.  . OPP    Social Drivers of Health  Financial Resource Strain: Low Risk  (2/9/2024)      Overall Financial Resource Strain (CARDIA)          Difficulty of Paying Living Expenses: Not hard at all  Food Insecurity: Unknown (2/9/2024)      Hunger Vital Sign          Worried About Running Out of Food in the Last Year: Never true  Transportation Needs: No Transportation Needs (2/9/2024)      PRAPARE - Transportation          Lack of Transportation (Medical): No          Lack of Transportation (Non-Medical): No  Physical Activity: Insufficiently Active (2/9/2024)      Exercise Vital Sign          Days of Exercise per Week: 2 days          Minutes of Exercise per Session: 50 min  Stress: No Stress Concern Present (2/9/2024)      [employer] - Occupational Stress Questionnaire          Feeling of Stress : Only a little  Housing Stability: Low Risk  (2/9/2024)      Housing Stability Vital Sign          Unable to Pay for Housing in the Last Year: No          Number of Places Lived in the Last Year: 1          Unstable Housing in the Last Year: No    Review of patient's family history indicates:  Problem: Hypertension      Relation: Mother          Name:               Age of Onset: (Not Specified)  Problem: Heart disease      Relation: Mother          Name:               Age of Onset: (Not Specified)               Comment: MI age 77  Problem: Kidney failure      Relation: Mother          Name:               Age of Onset: (Not Specified)  Problem: Diabetes      Relation: Mother          Name:               Age of Onset: (Not Specified)  Problem: Parkinsonism      Relation: Father          Name:               Age of Onset: (Not Specified)  Problem: Hypertension      Relation: Brother          Name:               Age of Onset: (Not Specified)  Problem: Hypertension      Relation: Brother          Name:               Age of Onset: (Not Specified)  Problem: Diabetes      Relation: Brother          Name:               Age of Onset: (Not Specified)  Problem: No Known Problems      Relation: Son          Name:               Age of Onset: (Not Specified)  Problem: Breast cancer      Relation: Neg Hx          Name:               Age of Onset: (Not Specified)  Problem: Ovarian cancer      Relation: Neg Hx          Name:               Age of Onset: (Not Specified)  Problem: Cervical cancer      Relation: Neg Hx          Name:               Age of Onset: (Not Specified)  Problem: Endometrial cancer      Relation: Neg Hx          Name:               Age of Onset: (Not Specified)  Problem: Vaginal cancer      Relation: Neg Hx          Name:               Age of Onset: (Not Specified)  Problem: Asthma      Relation: Neg Hx          Name:               Age of Onset: (Not Specified)  Problem: Emphysema      Relation: Neg Hx          Name:               Age of Onset: (Not Specified)      Current Outpatient Medications:  alendronate (FOSAMAX) 70 MG tablet, TAKE 1 TABLET BY MOUTH WEEKLY  WITH 8 OZ OF PLAIN WATER 30  MINUTES BEFORE FIRST FOOD, DRINK OR MEDS. STAY UPRIGHT FOR 30  MINS, Disp: 12 tablet, Rfl: 3  azithromycin (ZITHROMAX) 500 MG tablet, Take 1 tablet (500 mg total) by mouth once daily., Disp: 30 tablet, Rfl: 5  CALCIUM CARBONATE (CALCIUM 600 ORAL), Take by mouth., Disp: , Rfl:   cholecalciferol, vitamin D3, 125 mcg (5,000 unit)  Tab, Take 5,000 Units by mouth once daily., Disp: , Rfl:   estradioL (ESTRACE) 0.01 % (0.1 mg/gram) vaginal cream, 0.5 grams with applicator or dime-sized amount with finger in vagina nightly x 2 weeks, then twice a week thereafter, Disp: 42.5 g, Rfl: 11  ethambutoL (MYAMBUTOL) 400 MG Tab, Take 2 tablets (800 mg total) by mouth once daily., Disp: 60 tablet, Rfl: 5  metoprolol tartrate (LOPRESSOR) 25 MG tablet, Take 1 tablet (25 mg total) by mouth daily as needed (Palpitations)., Disp: 30 tablet, Rfl: 0  rivaroxaban (XARELTO) 20 mg Tab, Take 1 tablet (20 mg total) by mouth daily with dinner or evening meal., Disp: 90 tablet, Rfl: 3    No current facility-administered medications for this visit.            Review of Systems   Constitutional: Negative for malaise/fatigue.   Cardiovascular:  Positive for dyspnea on exertion. Negative for chest pain, irregular heartbeat, leg swelling and palpitations (minimal).   Respiratory:  Positive for cough, hemoptysis and shortness of breath.    Hematologic/Lymphatic: Negative for bleeding problem.   Skin:  Negative for rash.   Musculoskeletal:  Negative for myalgias.   Gastrointestinal:  Negative for hematemesis, hematochezia and nausea.   Genitourinary:  Negative for hematuria.   Neurological:  Negative for light-headedness.   Psychiatric/Behavioral:  Negative for altered mental status.    Allergic/Immunologic: Negative for persistent infections.          Objective     Physical Exam  Vitals and nursing note reviewed.   Constitutional:       Appearance: Normal appearance. She is well-developed.   HENT:      Head: Normocephalic.      Nose: Nose normal.   Eyes:      Pupils: Pupils are equal, round, and reactive to light.   Cardiovascular:      Rate and Rhythm: Normal rate and regular rhythm.   Pulmonary:      Effort: No respiratory distress.   Musculoskeletal:         General: Normal range of motion.   Skin:     General: Skin is warm and dry.      Findings: No erythema.    Neurological:      Mental Status: She is alert and oriented to person, place, and time.   Psychiatric:         Speech: Speech normal.         Behavior: Behavior normal.            Assessment and Plan     1. PAF (paroxysmal atrial fibrillation)    2. Hemoptysis        Plan:  74 yoF here for AF management. She has symptomatic paroxysmal AF as well as increased bleeding and stroke risks. Will reschedule her Watchman case and combine it with concomitant PFA ablation. I had extensive discussion with patient regarding risks and benefits of PVI/WACA, and the patient would like to proceed. Risks of procedure include (but are not limited to) bleeding, stroke, perforation requiring emergency draining or surgery, AV block, death, AV fistula, AE fistula, PV stenosis.    Last anti-coagulation dose night prior to procedure.  Discontinue antiarrhytmic drugs 4 days prior to the procedure.     PF PVI   Anesthesia  EDWINA prior, cancel if NSR  MANE    Watchman once PVI is complete

## 2025-01-14 ENCOUNTER — OFFICE VISIT (OUTPATIENT)
Dept: ELECTROPHYSIOLOGY | Facility: CLINIC | Age: 75
End: 2025-01-14
Payer: COMMERCIAL

## 2025-01-14 ENCOUNTER — HOSPITAL ENCOUNTER (OUTPATIENT)
Dept: CARDIOLOGY | Facility: CLINIC | Age: 75
Discharge: HOME | End: 2025-01-14
Payer: COMMERCIAL

## 2025-01-14 VITALS
WEIGHT: 103.63 LBS | DIASTOLIC BLOOD PRESSURE: 60 MMHG | SYSTOLIC BLOOD PRESSURE: 118 MMHG | HEIGHT: 61 IN | HEART RATE: 54 BPM | BODY MASS INDEX: 19.57 KG/M2

## 2025-01-14 DIAGNOSIS — I48.0 PAF (PAROXYSMAL ATRIAL FIBRILLATION): ICD-10-CM

## 2025-01-14 DIAGNOSIS — I48.3 TYPICAL ATRIAL FLUTTER: ICD-10-CM

## 2025-01-14 DIAGNOSIS — R04.2 HEMOPTYSIS: ICD-10-CM

## 2025-01-14 LAB
OHS QRS DURATION: 84 MS
OHS QTC CALCULATION: 501 MS

## 2025-01-14 PROCEDURE — 99999 PR PBB SHADOW E&M-EST. PATIENT-LVL III: CPT | Mod: PBBFAC,,, | Performed by: INTERNAL MEDICINE

## 2025-01-14 PROCEDURE — 99214 OFFICE O/P EST MOD 30 MIN: CPT | Mod: S$GLB,,, | Performed by: INTERNAL MEDICINE

## 2025-01-14 PROCEDURE — 1159F MED LIST DOCD IN RCRD: CPT | Mod: CPTII,S$GLB,, | Performed by: INTERNAL MEDICINE

## 2025-01-14 PROCEDURE — 1101F PT FALLS ASSESS-DOCD LE1/YR: CPT | Mod: CPTII,S$GLB,, | Performed by: INTERNAL MEDICINE

## 2025-01-14 PROCEDURE — 3008F BODY MASS INDEX DOCD: CPT | Mod: CPTII,S$GLB,, | Performed by: INTERNAL MEDICINE

## 2025-01-14 PROCEDURE — 3074F SYST BP LT 130 MM HG: CPT | Mod: CPTII,S$GLB,, | Performed by: INTERNAL MEDICINE

## 2025-01-14 PROCEDURE — 3078F DIAST BP <80 MM HG: CPT | Mod: CPTII,S$GLB,, | Performed by: INTERNAL MEDICINE

## 2025-01-14 PROCEDURE — 1126F AMNT PAIN NOTED NONE PRSNT: CPT | Mod: CPTII,S$GLB,, | Performed by: INTERNAL MEDICINE

## 2025-01-14 PROCEDURE — 93000 ELECTROCARDIOGRAM COMPLETE: CPT | Mod: S$GLB,,, | Performed by: INTERNAL MEDICINE

## 2025-01-14 PROCEDURE — 3288F FALL RISK ASSESSMENT DOCD: CPT | Mod: CPTII,S$GLB,, | Performed by: INTERNAL MEDICINE

## 2025-01-23 ENCOUNTER — TELEPHONE (OUTPATIENT)
Dept: UROGYNECOLOGY | Facility: CLINIC | Age: 75
End: 2025-01-23
Payer: COMMERCIAL

## 2025-01-23 NOTE — PROGRESS NOTES
Urogyn follow up  01/24/2025  .  [facility]  [address] [id number]    Luly Lora  [id number]  1950      Luly Lora is a 74 y.o. here for a urogyn follow up for pessary check.    Last HPI from 01/23/2018     HPI:       Ohs Peq [id number]      Question 1/21/2018  8:07 PM CST   Do you...     Usually experience pressure in the lower abdomen? Symptoms not present   Usually experience heaviness or dullness in the pelvic area? Symptoms not present   Usually have a bulge or something falling out that you can see or feel in your vaginal area? Symptoms present and they bother me quite a bit   Ever have to push on the vagina or around the rectum to complete a bowel movement? Symptoms not present   Usually experience a feeling of incomplete bladder emptying? Symptoms present and they bother me somewhat   Ever have to push up on a bulge in the vaginal area with your fingers to start or complete urination? Symptoms not present   Do you...     Feel you need to strain too hard to have a bowel movement? Symptoms not present   Feel you have not completely emptied your bowels at the end of a bowel movement?  Symptoms present and they bother me somewhat   Usually lose stool beyond your control if your stool is well formed? Symptoms not present   Usually lose stool beyond your control if your stool is loose? Symptoms present and they bother me somewhat   Usually lose gas from your rectum beyond your control? Symptoms not present   Usually have pain when you pass your stool? Symptoms not present   Experience a strong sense of urgency and have to rush to the bathroom to have a bowel movement? Symptoms not present   Does part of your bowel ever pass through the rectum and bulge outside during or after a bowel movement? Symptoms present and they bother me somewhat   Do you...      Usually experience frequent urination? Symptoms present and they bother me somewhat   Usually experience urine leakage  associated with a feeling of urgency, that is, a strong sensation of needing to go to the bathroom? Symptoms present and they bother me somewhat   Usually experience urine leakage related to coughing, sneezing or laughing? Symptoms not present   Usually experience small amounts of urine leakage (that is, drops)? Symptoms present and they bother me somewhat   Usually experience difficulty emptying your bladder? Symptoms present and they bother me somewhat   Usually experience pain or discomfort in the lower abdomen or genital region? Symptoms present and they bother me somewhat   POPDI  (range: 0 - 100) 25   CRADI (range: 0 - 100) 18.75   IGNACIO (range: 0 - 100) 41.66   TOTAL SCORE  (range: 0 - 300) 85.41   Ohs Peq Urogyn Hpi      Question 1/21/2018  8:21 PM CST   General Urogynecology: Are you experiencing the following?     Dysuria (painful urination) No   Nocturia:  waking up at night to empty your bladder  Yes   If you answered yes to the previous question, how many times does this happen per night? 1-2   Enuresis (urine loss during sleep) No   Dribbling urine after you urinate Yes   Hematuria (urine appears red) No   Type of stream Interrupted   Urinary frequency: How often a day are you going to the bathroom per day?  Less than 10   Urinary Tract Infections: How many Urinary Tract Infections have you had in the past year? I have not had a UTI in the past year   If you have had a UTI in the past year, what treatments have you had so far?  I have not had a UTI in the past year   Urinary Incontinence (General): Are you experiencing the following?     Past consultation for incontinence: Have you ever seen someone for the evaluation of incontinence? No   If you answered yes to the previous question, please select all the therapies you have tried.  N/a- I answered no to the previous question   Please note the effectiveness of the therapies.     Need to wear protection to keep clothes dry  No   If you answered yes to the  "previous question, please kuldeep the protection you use.  N/a- I answered no to the previous question   If you wear protection, how much wetness is typically on each pad? N/a- I do not wear protection   If you wear protection, how often do you have to change per day, if applicable?      Stress Symptoms: Are you experiencing the following?     Leakage of urine with cough, laugh and/or sneeze No   If you answered yes to the previous question, what is the frequency in days, weeks and/or months? Never   Leakage of urine with sex No   Leakage of urine with bending/ lifting No   Leakage of urine with briskly walking or jogging No   If you lose urine for any other reason not previously mentioned, please note it below, if applicable.      Urge Symptoms: Are you experiencing the following?     Urgency ("got to go" feeling) Yes   Urge: How frequently do you feel an urge to urinate (feeling like you "gotta go" to the bathroom and can't wait) Several times a week   Do you experience a leakage of urine when you have a feeling of urgency?  No   Leakage of urine when unaware No   Past use of anticholinergics (medications used to treat overactive bladder) No   If you answered yes to the previous question, please kuldeep the anticholinergics you have used:      Have you ever used Mirbetriq (aka Mirabegron)?  No   Prolapse Symptoms: Are you experiencing any of the following?      Falling out/ Bulging/ Heaviness in the vagina (past opening) x years; wears tight underwear Yes   Vaginal/ Abdominal Pain/ Pressure Yes   Need to strain/ Push to void No   Need to wait on the toilet before you void No   Unusual position to urinate (using your hands to push back the vaginal bulge) No   Sensation of incomplete emptying Yes--has to PV/DV to help   Past use of pessary device No   If you answered yes to the previous question, please list the devices you have used below.      Bowel Symptoms: Are you experiencing any of the following?     Constipation " No   Diarrhea  No   Hematochezia (bloody stool) No   Incomplete evacuation of stool Yes   Involuntary loss of formed stool No   Fecal smearing/urgency Yes   Involuntary loss of gas No   Vaginal Symptoms: Are you experiencing any of the following?      Abnormal vaginal bleeding  No   Vaginal dryness Yes   Sexually active  Yes   Dyspareunia (painful intercourse) Yes   Estrogen use  No   Ohs Peq Pelvic Pain Urogyn      Question 1/21/2018  8:22 PM CST   Are you experiencing pelvic pain?  No      Patient Hx             02/14/2024  1)  UI:  (--) BERNARD   (--) UUI    (+) pantyliner:  Daytime frequency: Q 2-3 hours.  Nocturia: Yes: 2-3/night.   (--) dysuria,  (--) hematuria,  (--) frequent UTIs.  (+) complete bladder emptying.      2)  POP:  Absent with pessary in place.  Symptoms:(--)    (--) vaginal bleeding. (+) vaginal discharge--pink tinged. More with certain movements.   (+)/(--) sexually active--unable to have intercourse with pessary in place.  (+) dyspareunia.   (--)  Vaginal dryness.  (--) vaginal estrogen use. Tried estrogen cream but had trouble getting in.    --cannot remove pessary independently  --last removal 5 months ago.  --initial POP-Q (2018):  Aa 0; Ba 0; C +2; Ap 0; Bp 0; D -6.  Genital hiatus 3, perineal body 2, total vaginal length 11-12.      3)  BM:  (--) constipation/straining.  (--) chronic diarrhea. Consistency is sticky.  (--) hematochezia.  (--) fecal incontinence  (+) fecal smearing/urgency--sometimes sudden, without reason.  (--) incomplete evacuation.       4) pessary:  Denies pain or bleeding.  Scant pink discharge.  Using #3 ring with support.  Independent in use.          Changes from last visit: (last visit 10/25/2024)  1)  Stage 3 uterine prolapse:  --using  #3 ring + support   --denies pain or bleeding  --scant pink discharge on pad 2-3 times/ week     2)  Vaginal atrophy (dryness):     --using vaginal estrogen cream                3)  Urinary urgency/post-void dribbling:  --rare UUI--  using 1-2 liner pads with minimal wetness  --voiding every 2 hours during the day and 2-3/ night     4)Fecal smearing:  --improved since not eating dairy  --not taking fiber          Past Medical History:   Diagnosis Date    Brain bleed 1998    Osteoporosis     Pelvic fracture        Past Surgical History:   Procedure Laterality Date    COLONOSCOPY N/A 2/16/2024    Procedure: COLONOSCOPY;  Surgeon: ALKA Arguelles MD;  Location: [facility] ([address] [place] [address]);  Service: Endoscopy;  Laterality: N/A;  Ref by Dr DIANA Copeland, Newly diagnosed AF-Pending Xarelto hold, PEG, portal - PC  ok to hold Xarelto 2 days per Dr Mcknight-GT  2/6/24- precall complete / Pt confirmed Xarelto holding ins.- ERW    EYE SURGERY  2005    ingrown eye lashes BL    WISDOM TOOTH EXTRACTION         Family History   Problem Relation Name Age of Onset    Hypertension Mother      Heart disease Mother          MI age 77    Kidney failure Mother      Diabetes Mother      Parkinsonism Father      Hypertension Brother      Hypertension Brother      Diabetes Brother      No Known Problems Son      Breast cancer Neg Hx      Ovarian cancer Neg Hx      Cervical cancer Neg Hx      Endometrial cancer Neg Hx      Vaginal cancer Neg Hx      Asthma Neg Hx      Emphysema Neg Hx         Social History     Socioeconomic History    Marital status:    Occupational History    Occupation: Lab Supervisor   Tobacco Use    Smoking status: Never    Smokeless tobacco: Never   Substance and Sexual Activity    Alcohol use: Yes     Alcohol/week: 1.0 standard drink of alcohol     Types: 1 Glasses of wine per week     Comment: occasionally- 3-4 a month    Drug use: No    Sexual activity: Yes     Partners: Male   Social History Narrative    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      Research in Med School for years. - ophth and inf dz, studying antibodies.         Exercise:  Walking 3 days a week, swam in past, resistance machines' dumbbells, table  tennis.          Chinese.  Emigrated 1988.         with CAD/CABG.  . OPP     Social Drivers of Health     Financial Resource Strain: Low Risk  (2/9/2024)    Overall Financial Resource Strain (CARDIA)     Difficulty of Paying Living Expenses: Not hard at all   Food Insecurity: Unknown (2/9/2024)    Hunger Vital Sign     Worried About Running Out of Food in the Last Year: Never true   Transportation Needs: No Transportation Needs (2/9/2024)    PRAPARE - Transportation     Lack of Transportation (Medical): No     Lack of Transportation (Non-Medical): No   Physical Activity: Insufficiently Active (2/9/2024)    Exercise Vital Sign     Days of Exercise per Week: 2 days     Minutes of Exercise per Session: 50 min   Stress: No Stress Concern Present (2/9/2024)    [employer] [place] [employer] - Occupational Stress Questionnaire     Feeling of Stress : Only a little   Housing Stability: Low Risk  (2/9/2024)    Housing Stability Vital Sign     Unable to Pay for Housing in the Last Year: No     Number of Places Lived in the Last Year: 1     Unstable Housing in the Last Year: No       Current Outpatient Medications   Medication Sig Dispense Refill    alendronate (FOSAMAX) 70 MG tablet TAKE 1 TABLET BY MOUTH WEEKLY  WITH 8 OZ OF PLAIN WATER 30  MINUTES BEFORE FIRST FOOD, DRINK OR MEDS. STAY UPRIGHT FOR 30  MINS 12 tablet 3    azithromycin (ZITHROMAX) 500 MG tablet Take 1 tablet (500 mg total) by mouth once daily. 30 tablet 5    CALCIUM CARBONATE (CALCIUM 600 ORAL) Take by mouth.      cholecalciferol, vitamin D3, 125 mcg (5,000 unit) Tab Take 5,000 Units by mouth once daily.      CLOFAZIMINE ORAL Take 2 capsules by mouth once daily.      estradioL (ESTRACE) 0.01 % (0.1 mg/gram) vaginal cream 0.5 grams with applicator or dime-sized amount with finger in vagina nightly x 2 weeks, then twice a week thereafter 42.5 g 11    ethambutoL (MYAMBUTOL) 400 MG Tab Take 2 tablets (800 mg total) by mouth once  "daily. 60 tablet 5    metoprolol tartrate (LOPRESSOR) 25 MG tablet Take 1 tablet (25 mg total) by mouth daily as needed (Palpitations). 30 tablet 0    rivaroxaban (XARELTO) 20 mg Tab Take 1 tablet (20 mg total) by mouth daily with dinner or evening meal. (Patient not taking: Reported on 1/24/2025) 90 tablet 3     No current facility-administered medications for this visit.       Review of patient's allergies indicates:  No Known Allergies    Well woman:  Pap test: 2016, normal.  History of abnormal paps: No.  History of STIs:  No  Mammogram: Date of last: 7/2024.  Result: Normal  Colonoscopy: Date of last: 2010.  Result:  normal.  Repeat due:  2020.  Scheduled for Fri 2-.  DEXA:  Date of last: 2022.  Result:  Osteoporosis, on meds.  Repeat due:  2022.    ROS:  As per HPI.      Exam  /66 (BP Location: Right arm, Patient Position: Sitting)   Pulse 64   Ht 5' 1" (1.549 m)   Wt 48 kg (105 lb 13.1 oz)   BMI 19.99 kg/m²   General: alert and oriented, no acute distress  Respiratory: normal respiratory effort  Abd: soft, non-tender, non-distended    Pelvic  Ext. Genitalia: normal external genitalia. Normal bartholin's and skeens glands  Vagina: + atrophy. Normal vaginal mucosa without lesions. No discharge noted.   Non-tender bladder base without palpable mass.  #3 ring with support in palce  Cervix: no lesions  Uterus:  uterus is normal size, shape, consistency and nontender   Urethra: no masses or tenderness  Urethral meatus: no lesions, caruncle or prolapse.    Pessary removed without difficulty. Washed with soap and water. Reinserted without difficulty.       Impression  1. Uterovaginal prolapse        2. Vaginal atrophy        3. Pessary maintenance              We reviewed the above issues and discussed options for short-term versus long-term management of her problems.   Plan:   1)  Stage 3 uterine prolapse:  --continue pessary: #3 ring + support   PESSARY CARE: Remove pessary as frequently as " nightly and as infrequently as every 2-3 weeks.  Remove before intercourse.  May need to remove before bowel movements if straining dislodges.   Wash with soap and water (no ).  Replace using small amount of water-based lubricant (like KY jelly) at end being introduced into vagina.    --having more trouble removing/replacing independently              --RTC Q3-4 months for pessary checks for now     --if tires of pessary use and is done with working (can't take off time for surgery right now):  --surgical option: vaginal hysterectomy, uterosacral suspension, anterior/posterior repair  --would need ultrasound/bladder testing beforehand  --would need plan from PCP or cards to bridge off/back on xarelto and if needs injections while off (usually give heparin 5000 U SQ preop, then Q8h periop)  --would need clearance per PCP (Min) + labs (CBC, CMP, T&S)/EKG     2)  Vaginal atrophy (dryness):     --use Vaginal estrogen:  --Use 0.5 grams of estrogen cream in vagina with applicator or dime-sized amount with finger (as far as can reach internally) twice a week.  You can also apply a dime-sized amount with your finger around the vaginal opening and inner lips at same frequency.               --try to get finger above or beneath pessary ring                          --Vaginal estrogen may help to decrease pain related to dryness with intercourse and urinary symptoms (urgency/frequency/UTIs) around menopause.                 3)  Urinary urgency/post-void dribbling:  --continue pessary   --Empty bladder every 3 hours.  Empty well: wait a minute, lean forward on toilet.    --Avoid dietary irritants (see sheet).  Keep diary x 3-5 days to determine your irritants.  --KEGELS: do 10 in AM and 10 in PM, holding each x 10 seconds.  When you feel urge to go, STOP, KEGEL, and when urge has passed, then go to bathroom.  Consider PT in future.    --URGE: consider medication in future.  Takes 2-4 weeks to see if will have  effect.  For dry mouth: get sour, sugar free lozenge or gum.       4) Elevated PVR:  --PVR improved with pessary and on  previous recheck     5)  Fecal smearing:  --hydrate well  --avoid dietary triggers     6)  RTC 3 months for pc      I spent a total of 20 minutes on the day of the visit.  This includes face to face time and non-face to face time preparing to see the patient (eg, review of tests), obtaining and/or reviewing separately obtained history, documenting clinical information in the electronic or other health record, independently interpreting results and communicating results to the patient/family/caregiver, or care coordinator.       Farheen Hopkins, JAYNEP-BC  [facility]  Division of Female Pelvic Medicine and Reconstructive Surgery  Department of Obstetrics & Gynecology

## 2025-01-24 ENCOUNTER — OFFICE VISIT (OUTPATIENT)
Dept: UROGYNECOLOGY | Facility: CLINIC | Age: 75
End: 2025-01-24
Payer: COMMERCIAL

## 2025-01-24 VITALS
DIASTOLIC BLOOD PRESSURE: 66 MMHG | SYSTOLIC BLOOD PRESSURE: 106 MMHG | HEART RATE: 64 BPM | HEIGHT: 61 IN | BODY MASS INDEX: 19.98 KG/M2 | WEIGHT: 105.81 LBS

## 2025-01-24 DIAGNOSIS — N81.4 UTEROVAGINAL PROLAPSE: Primary | ICD-10-CM

## 2025-01-24 DIAGNOSIS — Z46.89 PESSARY MAINTENANCE: ICD-10-CM

## 2025-01-24 DIAGNOSIS — N95.2 VAGINAL ATROPHY: ICD-10-CM

## 2025-01-24 PROCEDURE — 1159F MED LIST DOCD IN RCRD: CPT | Mod: CPTII,S$GLB,, | Performed by: NURSE PRACTITIONER

## 2025-01-24 PROCEDURE — 1101F PT FALLS ASSESS-DOCD LE1/YR: CPT | Mod: CPTII,S$GLB,, | Performed by: NURSE PRACTITIONER

## 2025-01-24 PROCEDURE — 3074F SYST BP LT 130 MM HG: CPT | Mod: CPTII,S$GLB,, | Performed by: NURSE PRACTITIONER

## 2025-01-24 PROCEDURE — 1160F RVW MEDS BY RX/DR IN RCRD: CPT | Mod: CPTII,S$GLB,, | Performed by: NURSE PRACTITIONER

## 2025-01-24 PROCEDURE — 99213 OFFICE O/P EST LOW 20 MIN: CPT | Mod: S$GLB,,, | Performed by: NURSE PRACTITIONER

## 2025-01-24 PROCEDURE — 3288F FALL RISK ASSESSMENT DOCD: CPT | Mod: CPTII,S$GLB,, | Performed by: NURSE PRACTITIONER

## 2025-01-24 PROCEDURE — 99999 PR PBB SHADOW E&M-EST. PATIENT-LVL III: CPT | Mod: PBBFAC,,, | Performed by: NURSE PRACTITIONER

## 2025-01-24 PROCEDURE — 3078F DIAST BP <80 MM HG: CPT | Mod: CPTII,S$GLB,, | Performed by: NURSE PRACTITIONER

## 2025-01-24 PROCEDURE — 3008F BODY MASS INDEX DOCD: CPT | Mod: CPTII,S$GLB,, | Performed by: NURSE PRACTITIONER

## 2025-01-24 PROCEDURE — 1126F AMNT PAIN NOTED NONE PRSNT: CPT | Mod: CPTII,S$GLB,, | Performed by: NURSE PRACTITIONER

## 2025-01-24 NOTE — PATIENT INSTRUCTIONS
1)  Stage 3 uterine prolapse:  --continue pessary: #3 ring + support   PESSARY CARE: Remove pessary as frequently as nightly and as infrequently as every 2-3 weeks.  Remove before intercourse.  May need to remove before bowel movements if straining dislodges.   Wash with soap and water (no ).  Replace using small amount of water-based lubricant (like KY jelly) at end being introduced into vagina.    --having more trouble removing/replacing independently              --RTC Q3-4 months for pessary checks for now     --if tires of pessary use and is done with working (can't take off time for surgery right now):  --surgical option: vaginal hysterectomy, uterosacral suspension, anterior/posterior repair  --would need ultrasound/bladder testing beforehand  --would need plan from PCP or cards to bridge off/back on xarelto and if needs injections while off (usually give heparin 5000 U SQ preop, then Q8h periop)  --would need clearance per PCP (Min) + labs (CBC, CMP, T&S)/EKG     2)  Vaginal atrophy (dryness):     --use Vaginal estrogen:  --Use 0.5 grams of estrogen cream in vagina with applicator or dime-sized amount with finger (as far as can reach internally) twice a week.  You can also apply a dime-sized amount with your finger around the vaginal opening and inner lips at same frequency.               --try to get finger above or beneath pessary ring                          --Vaginal estrogen may help to decrease pain related to dryness with intercourse and urinary symptoms (urgency/frequency/UTIs) around menopause.                 3)  Urinary urgency/post-void dribbling:  --continue pessary   --Empty bladder every 3 hours.  Empty well: wait a minute, lean forward on toilet.    --Avoid dietary irritants (see sheet).  Keep diary x 3-5 days to determine your irritants.  --KEGELS: do 10 in AM and 10 in PM, holding each x 10 seconds.  When you feel urge to go, STOP, KEGEL, and when urge has passed, then go to  bathroom.  Consider PT in future.    --URGE: consider medication in future.  Takes 2-4 weeks to see if will have effect.  For dry mouth: get sour, sugar free lozenge or gum.       4) Elevated PVR:  --PVR improved with pessary and on previous recheck     5)  Fecal smearing:  --hydrate well  --avoid dietary triggers     6)  RTC 3 months for pc

## 2025-02-19 ENCOUNTER — OFFICE VISIT (OUTPATIENT)
Dept: OPHTHALMOLOGY | Facility: CLINIC | Age: 75
End: 2025-02-19
Payer: COMMERCIAL

## 2025-02-19 DIAGNOSIS — H26.9 CATARACT OF BOTH EYES, UNSPECIFIED CATARACT TYPE: ICD-10-CM

## 2025-02-19 DIAGNOSIS — H25.13 NUCLEAR SCLEROSIS OF BOTH EYES: Primary | ICD-10-CM

## 2025-02-19 DIAGNOSIS — H54.7 VISION IMPAIRMENT: ICD-10-CM

## 2025-02-19 DIAGNOSIS — Z79.899 HIGH RISK MEDICATION USE: ICD-10-CM

## 2025-02-19 PROCEDURE — 1160F RVW MEDS BY RX/DR IN RCRD: CPT | Mod: CPTII,S$GLB,, | Performed by: OPHTHALMOLOGY

## 2025-02-19 PROCEDURE — 92136 OPHTHALMIC BIOMETRY: CPT | Mod: LT,S$GLB,, | Performed by: OPHTHALMOLOGY

## 2025-02-19 PROCEDURE — 1126F AMNT PAIN NOTED NONE PRSNT: CPT | Mod: CPTII,S$GLB,, | Performed by: OPHTHALMOLOGY

## 2025-02-19 PROCEDURE — 99999 PR PBB SHADOW E&M-EST. PATIENT-LVL III: CPT | Mod: PBBFAC,,, | Performed by: OPHTHALMOLOGY

## 2025-02-19 PROCEDURE — 99204 OFFICE O/P NEW MOD 45 MIN: CPT | Mod: S$GLB,,, | Performed by: OPHTHALMOLOGY

## 2025-02-19 PROCEDURE — 1159F MED LIST DOCD IN RCRD: CPT | Mod: CPTII,S$GLB,, | Performed by: OPHTHALMOLOGY

## 2025-02-19 RX ORDER — PREDNISOLONE ACETATE-GATIFLOXACIN-BROMFENAC .75; 5; 1 MG/ML; MG/ML; MG/ML
1 SUSPENSION/ DROPS OPHTHALMIC 3 TIMES DAILY
Qty: 8 ML | Refills: 3 | Status: SHIPPED | OUTPATIENT
Start: 2025-02-19

## 2025-02-19 RX ORDER — MOXIFLOXACIN 5 MG/ML
1 SOLUTION/ DROPS OPHTHALMIC
OUTPATIENT
Start: 2025-02-19 | End: 2025-02-19

## 2025-02-19 RX ORDER — PHENYLEPHRINE HYDROCHLORIDE 100 MG/ML
1 SOLUTION/ DROPS OPHTHALMIC
OUTPATIENT
Start: 2025-02-19

## 2025-02-19 RX ORDER — CYCLOP/TROP/PROPA/PHEN/KET/WAT 1-1-0.1%
1 DROPS (EA) OPHTHALMIC (EYE)
OUTPATIENT
Start: 2025-02-19 | End: 2025-02-19

## 2025-02-19 RX ORDER — FENTANYL CITRATE 50 UG/ML
25 INJECTION, SOLUTION INTRAMUSCULAR; INTRAVENOUS
Refills: 0 | OUTPATIENT
Start: 2025-02-19

## 2025-02-19 RX ORDER — MIDAZOLAM HYDROCHLORIDE 1 MG/ML
1 INJECTION, SOLUTION INTRAMUSCULAR; INTRAVENOUS
OUTPATIENT
Start: 2025-02-19

## 2025-02-19 NOTE — PROGRESS NOTES
HPI    75 y/o female present today for Cataract Evaluation   Patient state blurry [facility] OU, glare while driving at night   Denies flashes and floaters.    Wear contacts OU   Last edited by Lurdes Us on 2/19/2025  8:14 AM.            Assessment /Plan     For exam results, see Encounter Report.    Vision impairment  -     Ambulatory referral/consult to Ophthalmology    High risk medication use  -     Ambulatory referral/consult to Ophthalmology    Cataract of both eyes, unspecified cataract type  -     Ambulatory referral/consult to Ophthalmology      Visually Significant Cataract: Patient reports decreased vision consistent with the clinical amount of lenticular opacity, which reaches the level of visual significance and affects activities of daily living.     Specifically, this patient describes difficulty with:  - driving safely at night  - reading road signs  - reading small print  - deciphering medicine bottles  - reading the newspaper  - using the phone  - reading texts     Risks, benefits, and alternatives to cataract surgery were discussed and the consent reviewed. IOL options were discussed, including ATIOLs and the associated side effects and additional patient cost associated with them.   IOL Selections:   Right eye  IOL: diboo 5.0 (-1.00) vs 9.0 (near target) balance with post op Rx from [facility]     Left eye  IOL: diboo 5.0    Pt wishes to have left eye done first.  Extreme myope    Dense NSC OU so need phaco first, so that we can evaluate for ethambutol toxicity.

## 2025-02-21 ENCOUNTER — PATIENT MESSAGE (OUTPATIENT)
Dept: ELECTROPHYSIOLOGY | Facility: CLINIC | Age: 75
End: 2025-02-21
Payer: COMMERCIAL

## 2025-02-24 DIAGNOSIS — I48.0 PAF (PAROXYSMAL ATRIAL FIBRILLATION): Primary | ICD-10-CM

## 2025-03-12 ENCOUNTER — TELEPHONE (OUTPATIENT)
Dept: OPHTHALMOLOGY | Facility: CLINIC | Age: 75
End: 2025-03-12
Payer: COMMERCIAL

## 2025-03-12 DIAGNOSIS — H25.12 NUCLEAR SCLEROTIC CATARACT OF LEFT EYE: Primary | ICD-10-CM

## 2025-03-13 ENCOUNTER — TELEPHONE (OUTPATIENT)
Dept: OPHTHALMOLOGY | Facility: CLINIC | Age: 75
End: 2025-03-13
Payer: COMMERCIAL

## 2025-03-13 NOTE — TELEPHONE ENCOUNTER
----- Message from Abril sent at 3/13/2025 10:08 AM CDT -----  Regarding: Consult/Advisory  Contact: [phone]  Consult/Advisory Name Of Caller: pt   Contact Preference:  [phone] Nature of call: Would like to discuss change in procedure date she states she was not informed of change.  Please call to advise thank you

## 2025-03-26 ENCOUNTER — TELEPHONE (OUTPATIENT)
Dept: OPHTHALMOLOGY | Facility: CLINIC | Age: 75
End: 2025-03-26
Payer: COMMERCIAL

## 2025-03-26 DIAGNOSIS — H25.11 NUCLEAR SCLEROTIC CATARACT OF RIGHT EYE: Primary | ICD-10-CM

## 2025-04-09 ENCOUNTER — PATIENT MESSAGE (OUTPATIENT)
Dept: ELECTROPHYSIOLOGY | Facility: CLINIC | Age: 75
End: 2025-04-09
Payer: COMMERCIAL

## 2025-04-21 ENCOUNTER — TELEPHONE (OUTPATIENT)
Dept: OPHTHALMOLOGY | Facility: CLINIC | Age: 75
End: 2025-04-21
Payer: COMMERCIAL

## 2025-04-21 NOTE — TELEPHONE ENCOUNTER
Patient given arrival time of 10:15 am on Thursday April 24 . Nothing to eat or drink after 11:59 pm.   Start drops into the operative eye Tuesday. 1975 [facility]

## 2025-04-23 ENCOUNTER — TELEPHONE (OUTPATIENT)
Dept: OPHTHALMOLOGY | Facility: CLINIC | Age: 75
End: 2025-04-23
Payer: COMMERCIAL

## 2025-04-23 NOTE — TELEPHONE ENCOUNTER
----- Message from Mona sent at 4/23/2025  1:28 PM CDT -----  Regarding: Fever  Type:  Needs Medical AdviceWho Called: Luly Gonzalez (please be specific): Running fever of 100How long has patient had these symptoms:  TodayPharmacy name and phone #:  Would the patient rather a call back or a response via [facility]? Call backBest Call Back Number:  [phone] Information: Patient concerned with fever and having surgery tomorrow.

## 2025-04-23 NOTE — PRE-PROCEDURE INSTRUCTIONS
Patient states she has a fever today, would like to know if Dr. Chow would like to proceed.  Provider's office made aware.

## 2025-04-24 ENCOUNTER — HOSPITAL ENCOUNTER (OUTPATIENT)
Facility: HOSPITAL | Age: 75
Discharge: HOME | End: 2025-04-24
Attending: OPHTHALMOLOGY | Admitting: OPHTHALMOLOGY
Payer: COMMERCIAL

## 2025-04-24 VITALS
OXYGEN SATURATION: 96 % | WEIGHT: 102 LBS | HEART RATE: 81 BPM | SYSTOLIC BLOOD PRESSURE: 89 MMHG | HEIGHT: 60 IN | BODY MASS INDEX: 20.03 KG/M2 | DIASTOLIC BLOOD PRESSURE: 51 MMHG | RESPIRATION RATE: 20 BRPM | TEMPERATURE: 97 F

## 2025-04-24 DIAGNOSIS — H25.13 NUCLEAR SCLEROSIS OF BOTH EYES: ICD-10-CM

## 2025-04-24 DIAGNOSIS — H25.12 NUCLEAR SCLEROTIC CATARACT OF LEFT EYE: Primary | ICD-10-CM

## 2025-04-24 PROCEDURE — 99900035 HC TECH TIME PER 15 MIN (STAT)

## 2025-04-24 PROCEDURE — 99152 MOD SED SAME PHYS/QHP 5/>YRS: CPT | Mod: ,,, | Performed by: OPHTHALMOLOGY

## 2025-04-24 PROCEDURE — 71000015 HC POSTOP RECOV 1ST HR: Performed by: OPHTHALMOLOGY

## 2025-04-24 PROCEDURE — V2632 POST CHMBR INTRAOCULAR LENS: HCPCS | Performed by: OPHTHALMOLOGY

## 2025-04-24 PROCEDURE — 66984 XCAPSL CTRC RMVL W/O ECP: CPT | Mod: LT,,, | Performed by: OPHTHALMOLOGY

## 2025-04-24 PROCEDURE — 36000706: Performed by: OPHTHALMOLOGY

## 2025-04-24 PROCEDURE — 36000707: Performed by: OPHTHALMOLOGY

## 2025-04-24 PROCEDURE — 25000003 PHARM REV CODE 250: Performed by: OPHTHALMOLOGY

## 2025-04-24 PROCEDURE — 63600175 PHARM REV CODE 636 W HCPCS: Performed by: OPHTHALMOLOGY

## 2025-04-24 PROCEDURE — 94761 N-INVAS EAR/PLS OXIMETRY MLT: CPT

## 2025-04-24 PROCEDURE — 99152 MOD SED SAME PHYS/QHP 5/>YRS: CPT | Performed by: OPHTHALMOLOGY

## 2025-04-24 DEVICE — IMPLANTABLE DEVICE: Type: IMPLANTABLE DEVICE | Site: EYE | Status: FUNCTIONAL

## 2025-04-24 RX ORDER — FENTANYL CITRATE 50 UG/ML
25 INJECTION, SOLUTION INTRAMUSCULAR; INTRAVENOUS
Status: DISCONTINUED | OUTPATIENT
Start: 2025-04-24 | End: 2025-04-24 | Stop reason: HOSPADM

## 2025-04-24 RX ORDER — MOXIFLOXACIN 5 MG/ML
SOLUTION/ DROPS OPHTHALMIC
Status: DISCONTINUED | OUTPATIENT
Start: 2025-04-24 | End: 2025-04-24 | Stop reason: HOSPADM

## 2025-04-24 RX ORDER — MOXIFLOXACIN 5 MG/ML
1 SOLUTION/ DROPS OPHTHALMIC EVERY 5 MIN PRN
Status: COMPLETED | OUTPATIENT
Start: 2025-04-24 | End: 2025-04-24

## 2025-04-24 RX ORDER — CYCLOP/TROP/PROPA/PHEN/KET/WAT 1-1-0.1%
1 DROPS (EA) OPHTHALMIC (EYE) EVERY 5 MIN PRN
Status: COMPLETED | OUTPATIENT
Start: 2025-04-24 | End: 2025-04-24

## 2025-04-24 RX ORDER — PROPARACAINE HYDROCHLORIDE 5 MG/ML
SOLUTION/ DROPS OPHTHALMIC
Status: DISCONTINUED | OUTPATIENT
Start: 2025-04-24 | End: 2025-04-24 | Stop reason: HOSPADM

## 2025-04-24 RX ORDER — ACETAMINOPHEN 325 MG/1
650 TABLET ORAL EVERY 4 HOURS PRN
Status: DISCONTINUED | OUTPATIENT
Start: 2025-04-24 | End: 2025-04-24 | Stop reason: HOSPADM

## 2025-04-24 RX ORDER — PROPARACAINE HYDROCHLORIDE 5 MG/ML
1 SOLUTION/ DROPS OPHTHALMIC
Status: DISCONTINUED | OUTPATIENT
Start: 2025-04-24 | End: 2025-04-24 | Stop reason: HOSPADM

## 2025-04-24 RX ORDER — MOXIFLOXACIN 5 MG/ML
1 SOLUTION/ DROPS OPHTHALMIC
Status: COMPLETED | OUTPATIENT
Start: 2025-04-24 | End: 2025-04-24

## 2025-04-24 RX ORDER — LIDOCAINE HYDROCHLORIDE 40 MG/ML
INJECTION, SOLUTION RETROBULBAR
Status: DISCONTINUED | OUTPATIENT
Start: 2025-04-24 | End: 2025-04-24 | Stop reason: HOSPADM

## 2025-04-24 RX ORDER — PHENYLEPHRINE HYDROCHLORIDE 100 MG/ML
1 SOLUTION/ DROPS OPHTHALMIC
Status: DISCONTINUED | OUTPATIENT
Start: 2025-04-24 | End: 2025-04-24 | Stop reason: HOSPADM

## 2025-04-24 RX ORDER — MIDAZOLAM HYDROCHLORIDE 1 MG/ML
1 INJECTION, SOLUTION INTRAMUSCULAR; INTRAVENOUS
Status: DISCONTINUED | OUTPATIENT
Start: 2025-04-24 | End: 2025-04-24 | Stop reason: HOSPADM

## 2025-04-24 RX ADMIN — MOXIFLOXACIN 1 DROP: 5 SOLUTION/ DROPS OPHTHALMIC at 11:04

## 2025-04-24 RX ADMIN — Medication 1 DROP: at 10:04

## 2025-04-24 RX ADMIN — MOXIFLOXACIN OPHTHALMIC 1 DROP: 5 SOLUTION/ DROPS OPHTHALMIC at 10:04

## 2025-04-24 RX ADMIN — MOXIFLOXACIN 1 DROP: 5 SOLUTION/ DROPS OPHTHALMIC at 12:04

## 2025-04-24 RX ADMIN — MIDAZOLAM HYDROCHLORIDE 2 MG: 1 INJECTION, SOLUTION INTRAMUSCULAR; INTRAVENOUS at 11:04

## 2025-04-24 NOTE — PLAN OF CARE
Chart reviewed. Preop nursing care completed per orders. Safe surgery checklist complete. Pt denies any open wounds cuts or sores. Pt denies any metal in body. Family at bedside and belongings under stretcher. Waiting for surgical consent and site marking prior to surgery. Pt AAOX4, VSS on room air. Pt toileted, Bed locked in lowest position, Call light within reach. Pt denies any needs at this time.

## 2025-04-24 NOTE — OP NOTE
SURGEON:  Jean Chow M.D.    PREOPERATIVE DIAGNOSIS:    Nuclear Sclerotic Cataract Left Eye    POSTOPERATIVE DIAGNOSIS:    Nuclear Sclerotic Cataract Left Eye    PROCEDURES:    Phacoemulsification with  intraocular lens, Left eye (89354)  With moderate sedation >10min (00495)    DATE OF SURGERY: 04/24/2025    IMPLANT: dib00 5.0    ANESTHESIA:  Under my direct supervision, intravenous moderate sedation was administered during the course of this procedure, with continuous monitoring of hemodynamic parameters. Total time of sedation and amount of sedatives are documented in the nursing logs.    COMPLICATIONS:  None    ESTIMATED BLOOD LOSS: None    SPECIMENS: None    INDICATIONS:    The patient has a history of painless progressive visual loss and difficulty with activities of daily living, which specifically include difficult driving at night due to glare and difficulty reading small print, secondary to cataract formation.  After a thorough discussion of the risks, benefits, and alternatives to cataract surgery, including, but not limited to, the rare risks of infection, retinal detachment, hemorrhage, need for additional surgery, loss of vision, and even loss of the eye, the patient voices understanding and desires to proceed.    DESCRIPTION OF PROCEDURE:    The patients IOL calculations were reviewed, and the lens selection confirmed.   After verification and marking of the proper eye in the preop holding area, the patient was brought to the operating room in supine position where the eye was prepped and draped in standard sterile fashion with 5% Betadine and a lid speculum placed in the eye.   Topical 4% Lidocaine was used in addition to the preoperative anesthesia and the procedure was begun by the creation of a paracentesis incision through which viscoelastic was used to fill the anterior chamber.  Next, a keratome blade was used to create a triplanar temporal clear corneal incision and a cystotome and Utrata  forceps used to fashion a continuous curvilinear capsulorrhexis.  Hydrodissection was carried out using the Eubanks hydrodissection cannula and the nucleus was found to be mobile.  Phacoemulsification of the nucleus was carried out using a quick chop technique, and all remaining epinuclear and cortical material was removed.  The eye was then reformed with Viscoelastic and the  intraocular lens was implanted into the capsular bag.  All remaining viscoelastics were removed from the eye and at the end of the case the pupil was round, the lens was well-centered within the capsular bag and all wounds were found to be water tight.  Drops of Vigamox and Pred Forte were instilled and a shield was placed over the eye. The patient will follow up with Dr. Chow in the morning.

## 2025-04-24 NOTE — H&P
CC: Painless, progressive loss of vision  Present Illness: Nuclear sclerotic cataract  Allergies/Current Meds: see meds  Mental Status: A&O x3  Pertinent Medical History: n/a     Physical Exam  General: NAD  HEENT: Eye white/quiet  Lungs: Adequate respirations  Heart: + pulses  Abdomen: soft  Rectal/GI/: deferred     Impression: Cataract  Plan: Phacoemulsification with lens implant    ASA Score: II    Mallampati Score: II    Plan for Sedation: Moderate Sedation    Patient or Family History of Anesthesia problems : No    Any changes affecting the anesthesia assessment which may have changed since the initial assessment and H and P: No

## 2025-04-24 NOTE — DISCHARGE INSTRUCTIONS
CATARACT SURGERY    POST-OPERATIVE INSTRUCTIONS    · Apply drops THREE times a day into operative eye for 30 days.    · DO NOT rub your eye    · Wear protective sunglasses during the day    · Resume moderate activity    · Bathe/shower/wash face normally    · DO NOT apply makeup around the operative eye for 1 week.         You should expect    - Blurry vision and halos for 24-48 hours    - Dilated pupil for 24-48 hours    - Scratchy feeling in the eye for 1-2 days    - Curved shadow in your peripheral vision for 2-3 weeks    - Occasional flickering of lights for up to 1 week    -If you experience severe pain or nausea, please call Dr Chow or the on-call doctor at [phone]    - Plan to see Dr Chow tomorrow .      [facility]    [address] [facility] [address]    ** Most patients can drive the next day, but if you do not feel comfortable driving, please arrange for transportation.

## 2025-04-24 NOTE — DISCHARGE SUMMARY
Outcome: Successful outpatient ophthalmic surgical procedure  Preprinted Instructions given to patient.  Regular diet.  Activity: No restrictions  Meds: see Med Rec  Condition: stable  Follow up: 1 day with Dr Chow  Disposition: Home  Diagnosis: s/p eye surgery  Date of discharge: 04/24/2025

## 2025-04-25 ENCOUNTER — OFFICE VISIT (OUTPATIENT)
Dept: OPHTHALMOLOGY | Facility: CLINIC | Age: 75
End: 2025-04-25
Payer: COMMERCIAL

## 2025-04-25 ENCOUNTER — OFFICE VISIT (OUTPATIENT)
Dept: UROGYNECOLOGY | Facility: CLINIC | Age: 75
End: 2025-04-25
Payer: COMMERCIAL

## 2025-04-25 VITALS
DIASTOLIC BLOOD PRESSURE: 65 MMHG | WEIGHT: 99.19 LBS | BODY MASS INDEX: 19.47 KG/M2 | HEART RATE: 78 BPM | SYSTOLIC BLOOD PRESSURE: 103 MMHG | HEIGHT: 60 IN

## 2025-04-25 DIAGNOSIS — N81.4 UTEROVAGINAL PROLAPSE: Primary | ICD-10-CM

## 2025-04-25 DIAGNOSIS — H25.12 NUCLEAR SCLEROSIS OF LEFT EYE: ICD-10-CM

## 2025-04-25 DIAGNOSIS — N95.2 VAGINAL ATROPHY: ICD-10-CM

## 2025-04-25 DIAGNOSIS — Z46.89 PESSARY MAINTENANCE: ICD-10-CM

## 2025-04-25 DIAGNOSIS — Z98.890 POST-OPERATIVE STATE: Primary | ICD-10-CM

## 2025-04-25 PROCEDURE — 99999 PR PBB SHADOW E&M-EST. PATIENT-LVL III: CPT | Mod: PBBFAC,,,

## 2025-04-25 PROCEDURE — 99999 PR PBB SHADOW E&M-EST. PATIENT-LVL IV: CPT | Mod: PBBFAC,,, | Performed by: NURSE PRACTITIONER

## 2025-04-25 NOTE — PROGRESS NOTES
HPI    04/24/2025 IMPLANT: dib00 5.0 OS    Patient is here today for 1 day phaco OS. OS is doing well since surgery.   No pain or discomfort.    PMB TID OS    Last edited by Francy Razo on 4/25/2025  8:10 AM.            Assessment /Plan     For exam results, see Encounter Report.    Post-operative state    Nuclear sclerosis of left eye      Slit lamp exam:  L/L: nl  K: clear, wound sealed  AC: 1+ cell  Lens: IOL centered and stable    POD1 s/p Phaco/IOL  Appropriate precautions and post op medications reviewed.  Patient instructed to call or come in if symptoms of redness, decreased vision, or pain are experienced.

## 2025-04-25 NOTE — PROGRESS NOTES
Urogyn follow up  01/24/2025  .  [facility] UR[facility]  [address] [address]    Luly Lora  [id number]  1950      Luly Lora is a 74 y.o. here for a urogyn follow up for pessary check.    Last HPI from 01/23/2018     HPI:       Ohs Peq Pfdi20      Question 1/21/2018  8:07 PM CST   Do you...     Usually experience pressure in the lower abdomen? Symptoms not present   Usually experience heaviness or dullness in the pelvic area? Symptoms not present   Usually have a bulge or something falling out that you can see or feel in your vaginal area? Symptoms present and they bother me quite a bit   Ever have to push on the vagina or around the rectum to complete a bowel movement? Symptoms not present   Usually experience a feeling of incomplete bladder emptying? Symptoms present and they bother me somewhat   Ever have to push up on a bulge in the vaginal area with your fingers to start or complete urination? Symptoms not present   Do you...     Feel you need to strain too hard to have a bowel movement? Symptoms not present   Feel you have not completely emptied your bowels at the end of a bowel movement?  Symptoms present and they bother me somewhat   Usually lose stool beyond your control if your stool is well formed? Symptoms not present   Usually lose stool beyond your control if your stool is loose? Symptoms present and they bother me somewhat   Usually lose gas from your rectum beyond your control? Symptoms not present   Usually have pain when you pass your stool? Symptoms not present   Experience a strong sense of urgency and have to rush to the bathroom to have a bowel movement? Symptoms not present   Does part of your bowel ever pass through the rectum and bulge outside during or after a bowel movement? Symptoms present and they bother me somewhat   Do you...      Usually experience frequent urination? Symptoms present and they bother me somewhat   Usually experience urine leakage  associated with a feeling of urgency, that is, a strong sensation of needing to go to the bathroom? Symptoms present and they bother me somewhat   Usually experience urine leakage related to coughing, sneezing or laughing? Symptoms not present   Usually experience small amounts of urine leakage (that is, drops)? Symptoms present and they bother me somewhat   Usually experience difficulty emptying your bladder? Symptoms present and they bother me somewhat   Usually experience pain or discomfort in the lower abdomen or genital region? Symptoms present and they bother me somewhat   POPDI  (range: 0 - 100) 25   CRADI (range: 0 - 100) 18.75   IGNACIO (range: 0 - 100) 41.66   TOTAL SCORE  (range: 0 - 300) 85.41   Ohs Peq Urogyn Hpi      Question 1/21/2018  8:21 PM CST   General Urogynecology: Are you experiencing the following?     Dysuria (painful urination) No   Nocturia:  waking up at night to empty your bladder  Yes   If you answered yes to the previous question, how many times does this happen per night? 1-2   Enuresis (urine loss during sleep) No   Dribbling urine after you urinate Yes   Hematuria (urine appears red) No   Type of stream Interrupted   Urinary frequency: How often a day are you going to the bathroom per day?  Less than 10   Urinary Tract Infections: How many Urinary Tract Infections have you had in the past year? I have not had a UTI in the past year   If you have had a UTI in the past year, what treatments have you had so far?  I have not had a UTI in the past year   Urinary Incontinence (General): Are you experiencing the following?     Past consultation for incontinence: Have you ever seen someone for the evaluation of incontinence? No   If you answered yes to the previous question, please select all the therapies you have tried.  N/a- I answered no to the previous question   Please note the effectiveness of the therapies.     Need to wear protection to keep clothes dry  No   If you answered yes to the  "previous question, please kuldeep the protection you use.  N/a- I answered no to the previous question   If you wear protection, how much wetness is typically on each pad? N/a- I do not wear protection   If you wear protection, how often do you have to change per day, if applicable?      Stress Symptoms: Are you experiencing the following?     Leakage of urine with cough, laugh and/or sneeze No   If you answered yes to the previous question, what is the frequency in days, weeks and/or months? Never   Leakage of urine with sex No   Leakage of urine with bending/ lifting No   Leakage of urine with briskly walking or jogging No   If you lose urine for any other reason not previously mentioned, please note it below, if applicable.      Urge Symptoms: Are you experiencing the following?     Urgency ("got to go" feeling) Yes   Urge: How frequently do you feel an urge to urinate (feeling like you "gotta go" to the bathroom and can't wait) Several times a week   Do you experience a leakage of urine when you have a feeling of urgency?  No   Leakage of urine when unaware No   Past use of anticholinergics (medications used to treat overactive bladder) No   If you answered yes to the previous question, please kuldeep the anticholinergics you have used:      Have you ever used Mirbetriq (aka Mirabegron)?  No   Prolapse Symptoms: Are you experiencing any of the following?      Falling out/ Bulging/ Heaviness in the vagina (past opening) x years; wears tight underwear Yes   Vaginal/ Abdominal Pain/ Pressure Yes   Need to strain/ Push to void No   Need to wait on the toilet before you void No   Unusual position to urinate (using your hands to push back the vaginal bulge) No   Sensation of incomplete emptying Yes--has to PV/DV to help   Past use of pessary device No   If you answered yes to the previous question, please list the devices you have used below.      Bowel Symptoms: Are you experiencing any of the following?     Constipation " No   Diarrhea  No   Hematochezia (bloody stool) No   Incomplete evacuation of stool Yes   Involuntary loss of formed stool No   Fecal smearing/urgency Yes   Involuntary loss of gas No   Vaginal Symptoms: Are you experiencing any of the following?      Abnormal vaginal bleeding  No   Vaginal dryness Yes   Sexually active  Yes   Dyspareunia (painful intercourse) Yes   Estrogen use  No   Ohs Peq Pelvic Pain Urogyn      Question 1/21/2018  8:22 PM CST   Are you experiencing pelvic pain?  No      Patient Hx             02/14/2024  1)  UI:  (--) BERNARD   (--) UUI    (+) pantyliner:  Daytime frequency: Q 2-3 hours.  Nocturia: Yes: 2-3/night.   (--) dysuria,  (--) hematuria,  (--) frequent UTIs.  (+) complete bladder emptying.      2)  POP:  Absent with pessary in place.  Symptoms:(--)    (--) vaginal bleeding. (+) vaginal discharge--pink tinged. More with certain movements.   (+)/(--) sexually active--unable to have intercourse with pessary in place.  (+) dyspareunia.   (--)  Vaginal dryness.  (--) vaginal estrogen use. Tried estrogen cream but had trouble getting in.    --cannot remove pessary independently  --last removal 5 months ago.  --initial POP-Q (2018):  Aa 0; Ba 0; C +2; Ap 0; Bp 0; D -6.  Genital hiatus 3, perineal body 2, total vaginal length 11-12.      3)  BM:  (--) constipation/straining.  (--) chronic diarrhea. Consistency is sticky.  (--) hematochezia.  (--) fecal incontinence  (+) fecal smearing/urgency--sometimes sudden, without reason.  (--) incomplete evacuation.       4) pessary:  Denies pain or bleeding.  Scant pink discharge.  Using #3 ring with support.  Independent in use.          Changes from last visit: (last visit 01/24/2025)  1)  Stage 3 uterine prolapse:  --using  #3 ring + support   --denies pain or bleeding       2)  Vaginal atrophy (dryness):     --using vaginal estrogen cream                3)  Urinary urgency/post-void dribbling:  --rare UUI-- using 1-2 liner pads with minimal  wetness  --voiding every 2 hours during the day and 2-3/ night     4)Fecal smearing:  --improved since not eating dairy  --not taking fiber      Complains of URI symptoms and low grade fever    Past Medical History:   Diagnosis Date    Brain bleed 1998    Osteoporosis     Pelvic fracture        Past Surgical History:   Procedure Laterality Date    CATARACT EXTRACTION W/  INTRAOCULAR LENS IMPLANT Left 4/24/2025    Procedure: EXTRACTION, CATARACT, WITH IOL INSERTION;  Surgeon: Jean Chow MD;  [facility]: [facility] OR;  Service: Ophthalmology;  Laterality: Left;    COLONOSCOPY N/A 2/16/2024    Procedure: COLONOSCOPY;  Surgeon: ALKA Arguelles MD;  Location: [facility] ENDO ([address] [place] [address]);  Service: Endoscopy;  Laterality: N/A;  Ref by Dr DIANA Copeland, Newly diagnosed AF-Pending Xarelto hold, PEG, portal - PC  ok to hold Xarelto 2 days per Dr Mcknight-GT  2/6/24- precall complete / Pt confirmed Xarelto holding ins.- ERW    EYE SURGERY  2005    ingrown eye lashes BL    WISDOM TOOTH EXTRACTION         Family History   Problem Relation Name Age of Onset    Hypertension Mother      Heart disease Mother          MI age 77    Kidney failure Mother      Diabetes Mother      Parkinsonism Father      Hypertension Brother      Hypertension Brother      Diabetes Brother      No Known Problems Son      Breast cancer Neg Hx      Ovarian cancer Neg Hx      Cervical cancer Neg Hx      Endometrial cancer Neg Hx      Vaginal cancer Neg Hx      Asthma Neg Hx      Emphysema Neg Hx         Social History     Socioeconomic History    Marital status:    Occupational History    Occupation: Lab Supervisor   Tobacco Use    Smoking status: Never    Smokeless tobacco: Never   Substance and Sexual Activity    Alcohol use: Yes     Alcohol/week: 1.0 standard drink of alcohol     Types: 1 Glasses of wine per week     Comment: occasionally- 3-4 a month    Drug use: No    Sexual activity: Yes     Partners: Male   Social History Narrative    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 - ophth and inf dz, studying antibodies.         Exercise:  Walking 3 days a week, swam in past, resistance machines' dumbbells, table tennis.          Chinese.  Emigrated 1988.         with CAD/CABG.  . OPP     Social Drivers of Health     Financial Resource Strain: Low Risk  (2/9/2024)    Overall Financial Resource Strain (CARDIA)     Difficulty of Paying Living Expenses: Not hard at all   Food Insecurity: Unknown (2/9/2024)    Hunger Vital Sign     Worried About Running Out of Food in the Last Year: Never true   Transportation Needs: No Transportation Needs (2/9/2024)    PRAPARE - Transportation     Lack of Transportation (Medical): No     Lack of Transportation (Non-Medical): No   Physical Activity: Insufficiently Active (2/9/2024)    Exercise Vital Sign     Days of Exercise per Week: 2 days     Minutes of Exercise per Session: 50 min   Stress: No Stress Concern Present (2/9/2024)    [employer] [place] - Occupational Stress Questionnaire     Feeling of Stress : Only a little   Housing Stability: Low Risk  (2/9/2024)    Housing Stability Vital Sign     Unable to Pay for Housing in the Last Year: No     Number of Places Lived in the Last Year: 1     Unstable Housing in the Last Year: No       Current Outpatient Medications   Medication Sig Dispense Refill    alendronate (FOSAMAX) 70 MG tablet TAKE 1 TABLET BY MOUTH WEEKLY  WITH 8 OZ OF PLAIN WATER 30  MINUTES BEFORE FIRST FOOD, DRINK OR MEDS. STAY UPRIGHT FOR 30  MINS 12 tablet 3    azithromycin (ZITHROMAX) 500 MG tablet Take 1 tablet (500 mg total) by mouth once daily. 30 tablet 5    CALCIUM CARBONATE (CALCIUM 600 ORAL) Take by mouth.      cholecalciferol, vitamin D3, 125 mcg (5,000 unit) Tab Take 5,000 Units by mouth once daily.      CLOFAZIMINE ORAL Take 2 capsules by mouth once daily.      estradioL (ESTRACE) 0.01 % (0.1 mg/gram)  vaginal cream 0.5 grams with applicator or dime-sized amount with finger in vagina nightly x 2 weeks, then twice a week thereafter 42.5 g 11    ethambutoL (MYAMBUTOL) 400 MG Tab Take 2 tablets (800 mg total) by mouth once daily. 60 tablet 5    prednisolon/gatiflox/bromfenac (PREDNISOL ACE-GATIFLOX-BROMFEN) 1-0.5-0.075 % DrpS Apply 1 drop to eye 3 (three) times daily. in operative eye for 1 month after surgery 8 mL 3    metoprolol tartrate (LOPRESSOR) 25 MG tablet Take 1 tablet (25 mg total) by mouth daily as needed (Palpitations). 30 tablet 0    rivaroxaban (XARELTO) 20 mg Tab Take 1 tablet (20 mg total) by mouth daily with dinner or evening meal. (Patient not taking: Reported on 1/24/2025) 90 tablet 3     No current facility-administered medications for this visit.       Review of patient's allergies indicates:  No Known Allergies    Well woman:  Pap test: 2016, normal.  History of abnormal paps: No.  History of STIs:  No  Mammogram: Date of last: 7/2024.  Result: Normal  Colonoscopy: Date of last: 2010.  Result:  normal.  Repeat due:  2020.  Scheduled for Fri 2-.  DEXA:  Date of last: 2022.  Result:  Osteoporosis, on meds.  Repeat due:  2022.    ROS:  As per HPI.      Exam  /65 (BP Location: Right arm, Patient Position: Sitting)   Pulse 78   Ht 5' (1.524 m)   Wt 45 kg (99 lb 3.3 oz)   BMI 19.38 kg/m²   General: alert and oriented, no acute distress  Respiratory: normal respiratory effort  Abd: soft, non-tender, non-distended    Pelvic  Ext. Genitalia: normal external genitalia. Normal bartholin's and skeens glands  Vagina: + atrophy. Normal vaginal mucosa without lesions. No discharge noted.   Non-tender bladder base without palpable mass.  #3 ring with support in palce  Cervix: no lesions  Uterus:  uterus is normal size, shape, consistency and nontender   Urethra: no masses or tenderness  Urethral meatus: no lesions, caruncle or prolapse.    Pessary removed without difficulty. Washed with soap  and water. Reinserted without difficulty.       Impression  1. Uterovaginal prolapse        2. Vaginal atrophy        3. Pessary maintenance                We reviewed the above issues and discussed options for short-term versus long-term management of her problems.   Plan:   1)  Stage 3 uterine prolapse:  --continue pessary: #3 ring + support   PESSARY CARE: Remove pessary as frequently as nightly and as infrequently as every 2-3 weeks.  Remove before intercourse.  May need to remove before bowel movements if straining dislodges.   Wash with soap and water (no ).  Replace using small amount of water-based lubricant (like KY jelly) at end being introduced into vagina.    --having more trouble removing/replacing independently              --RTC Q3-4 months for pessary checks for now     --if tires of pessary use and is done with working (can't take off time for surgery right now):  --surgical option: vaginal hysterectomy, uterosacral suspension, anterior/posterior repair  --would need ultrasound/bladder testing beforehand  --would need plan from PCP or cards to bridge off/back on xarelto and if needs injections while off (usually give heparin 5000 U SQ preop, then Q8h periop)  --would need clearance per PCP (Min) + labs (CBC, CMP, T&S)/EKG     2)  Vaginal atrophy (dryness):     --use Vaginal estrogen:  --Use 0.5 grams of estrogen cream in vagina with applicator or dime-sized amount with finger (as far as can reach internally) twice a week.  You can also apply a dime-sized amount with your finger around the vaginal opening and inner lips at same frequency.               --try to get finger above or beneath pessary ring                          --Vaginal estrogen may help to decrease pain related to dryness with intercourse and urinary symptoms (urgency/frequency/UTIs) around menopause.                 3)  Urinary urgency/post-void dribbling:  --continue pessary   --Empty bladder every 3 hours.  Empty  well: wait a minute, lean forward on toilet.    --Avoid dietary irritants (see sheet).  Keep diary x 3-5 days to determine your irritants.  --KEGELS: do 10 in AM and 10 in PM, holding each x 10 seconds.  When you feel urge to go, STOP, KEGEL, and when urge has passed, then go to bathroom.  Consider PT in future.    --URGE: consider medication in future.  Takes 2-4 weeks to see if will have effect.  For dry mouth: get sour, sugar free lozenge or gum.       4) Elevated PVR:  --PVR improved with pessary and on  previous recheck     5)  Fecal smearing:  --hydrate well  --avoid dietary triggers     6)  RTC 3 months for pc  --instructed to go to urgent care      I spent a total of 20 minutes on the day of the visit.  This includes face to face time and non-face to face time preparing to see the patient (eg, review of tests), obtaining and/or reviewing separately obtained history, documenting clinical information in the electronic or other health record, independently interpreting results and communicating results to the patient/family/caregiver, or care coordinator.       Farheen Hopkins, SRI-BC  [facility]  Division of Female Pelvic Medicine and Reconstructive Surgery  Department of Obstetrics & Gynecology

## 2025-05-02 ENCOUNTER — OFFICE VISIT (OUTPATIENT)
Dept: OPHTHALMOLOGY | Facility: CLINIC | Age: 75
End: 2025-05-02
Payer: COMMERCIAL

## 2025-05-02 DIAGNOSIS — Z98.890 POST-OPERATIVE STATE: Primary | ICD-10-CM

## 2025-05-02 DIAGNOSIS — H25.13 NUCLEAR SCLEROSIS, BILATERAL: ICD-10-CM

## 2025-05-02 PROCEDURE — 99999 PR PBB SHADOW E&M-EST. PATIENT-LVL III: CPT | Mod: PBBFAC,,, | Performed by: OPHTHALMOLOGY

## 2025-05-02 RX ORDER — CYCLOP/TROP/PROPA/PHEN/KET/WAT 1-1-0.1%
1 DROPS (EA) OPHTHALMIC (EYE)
OUTPATIENT
Start: 2025-05-02 | End: 2025-05-02

## 2025-05-02 RX ORDER — HYDROCODONE BITARTRATE AND ACETAMINOPHEN 500; 5 MG/1; MG/1
TABLET ORAL
Status: CANCELLED | OUTPATIENT
Start: 2025-05-02

## 2025-05-02 RX ORDER — FENTANYL CITRATE 50 UG/ML
25 INJECTION, SOLUTION INTRAMUSCULAR; INTRAVENOUS
Refills: 0 | OUTPATIENT
Start: 2025-05-02

## 2025-05-02 RX ORDER — MIDAZOLAM HYDROCHLORIDE 1 MG/ML
1 INJECTION, SOLUTION INTRAMUSCULAR; INTRAVENOUS
OUTPATIENT
Start: 2025-05-02

## 2025-05-02 RX ORDER — PHENYLEPHRINE HYDROCHLORIDE 100 MG/ML
1 SOLUTION/ DROPS OPHTHALMIC
OUTPATIENT
Start: 2025-05-02

## 2025-05-02 RX ORDER — MOXIFLOXACIN 5 MG/ML
1 SOLUTION/ DROPS OPHTHALMIC
OUTPATIENT
Start: 2025-05-02 | End: 2025-05-02

## 2025-05-02 RX ORDER — CEFAZOLIN 2 G/1
2 INJECTION, POWDER, FOR SOLUTION INTRAMUSCULAR; INTRAVENOUS
Status: CANCELLED | OUTPATIENT
Start: 2025-05-02

## 2025-05-02 NOTE — PROGRESS NOTES
HPI    PROCEDURES:    Phacoemulsification with  intraocular lens, Left eye (11069)  With moderate sedation >10min (05491)     DATE OF SURGERY: 04/24/2025     IMPLANT: dib00 5.0    Gtt's: PMB TID OS    Patient is here for 1 week post op of left eye.  Pt. States vision still distorted.  Pt. Denies pain or discomfort.   Last edited by Laurel Madison on 5/2/2025  9:39 AM.            Assessment /Plan     For exam results, see Encounter Report.    Post-operative state    Nuclear sclerosis, bilateral      Slit lamp exam:  L/L: nl  K: clear, wound sealed  AC: trace cell  Iris/Lens: IOL centered and stable    POW1 s/p phaco: Surgery healing well with no signs of infection or abnormal inflammation.    Patient wishes to proceed with surgery in the second eye. Risks, benefits, alternatives reviewed. IOL selection reviewed.    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   Retina consult post phaco...

## 2025-05-02 NOTE — H&P (VIEW-ONLY)
HPI    PROCEDURES:    Phacoemulsification with  intraocular lens, Left eye (84633)  With moderate sedation >10min (99730)     DATE OF SURGERY: 04/24/2025     IMPLANT: dib00 5.0    Gtt's: PMB TID OS    Patient is here for 1 week post op of left eye.  Pt. States vision still distorted.  Pt. Denies pain or discomfort.   Last edited by Laurel Madison on 5/2/2025  9:39 AM.            Assessment /Plan     For exam results, see Encounter Report.    Post-operative state    Nuclear sclerosis, bilateral      Slit lamp exam:  L/L: nl  K: clear, wound sealed  AC: trace cell  Iris/Lens: IOL centered and stable    POW1 s/p phaco: Surgery healing well with no signs of infection or abnormal inflammation.    Patient wishes to proceed with surgery in the second eye. Risks, benefits, alternatives reviewed. IOL selection reviewed.    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   Retina consult post phaco...

## 2025-05-13 ENCOUNTER — TELEPHONE (OUTPATIENT)
Dept: OPHTHALMOLOGY | Facility: CLINIC | Age: 75
End: 2025-05-13
Payer: COMMERCIAL

## 2025-05-13 NOTE — TELEPHONE ENCOUNTER
Patient given arrival time of 7:00 am on Thursday May 15. Nothing to eat or drink after 11:59 pm.  Start drops into the operative eye Tuesday [address] [facility]

## 2025-05-14 NOTE — PRE-PROCEDURE INSTRUCTIONS
Patient reviewed on 5/14/2025.  Okay to proceed at [place]. The following pre-procedure instructions and arrival time have been reviewed with patient via phone and sent to patient portal for review.  Patient verbalized an understanding.  Pt to be accompanied by  day of procedure as responsible .    Dear DINORA     Below you will find basic pre-procedure instructions in preparation for your procedure on 5/15/25 with Dr. Chow        You should receive your arrival time within 24-48 hours of your scheduled procedure date from the  at your surgeon's office.     Nothing to eat after midnight the night before your surgery. STOP drinking clear liquids 2 hours prior to your arrival time. Anesthesia encourages this to ensure that you are adequately hydrated. Clear liquids includes water, clear juices, Gatorade, black coffee (no milk, no cream), or soda.  Clear liquids do NOT include anything with pulp or food particles (chicken broth, ice cream, yogurt, Jello, etc.)      - HOLD all oral Diabetic medications night before and morning of procedure  - HOLD all Insulin morning of procedure  - HOLD all Fluid pills morning of procedure  - HOLD all non-insulin shots until after surgery (Ozempic, Mounjaro, Trulicity, Victoza, Byetta, Wegovy and Adlyxin) (7 days prior)  - HOLD all vitamins, minerals and herbal supplements morning of procedure   - TAKE all B/P meds, EXCEPT those that contain a fluid pill (ex. Lasix, Hydroclorothiazide/HCTZ, Spirnolactone)  - USE inhalers as needed and bring AM of surgery  - USE EYE DROPS as directed  -TAKE blood thinner meds AM of surgery unless otherwise instructed by your provider to not take     - Shower and wash face with antibacterial soap (ex. Dial) for 3 mins PM prior and AM of surgery  - No powder, lotions, creams, oils, gels, ointments, makeup,  or jewelry    - Wear comfortable clothing (button up shirt)     (Patient is required to have a responsible ride to transport  home, ride may not leave while patient is in surgery)     -- [facility], 2nd floor Surgery Center, located   @ [address], [place] [address]  2nd Floor Registration        If you have any questions or concerns please feel free to contact your surgeon's office.     In the event that you are running late or need to reschedule on the day of your procedure, please contact the pre-op desk at [phone].          Please reply to this message as receipt of delivery.     Catina, LPN [facility]  Pre-Admit - Anesthesia Dept

## 2025-05-15 ENCOUNTER — HOSPITAL ENCOUNTER (OUTPATIENT)
Facility: HOSPITAL | Age: 75
Discharge: HOME | End: 2025-05-15
Attending: OPHTHALMOLOGY | Admitting: OPHTHALMOLOGY
Payer: COMMERCIAL

## 2025-05-15 VITALS
OXYGEN SATURATION: 98 % | HEIGHT: 60 IN | TEMPERATURE: 98 F | HEART RATE: 56 BPM | RESPIRATION RATE: 16 BRPM | BODY MASS INDEX: 18.26 KG/M2 | WEIGHT: 93 LBS | DIASTOLIC BLOOD PRESSURE: 63 MMHG | SYSTOLIC BLOOD PRESSURE: 116 MMHG

## 2025-05-15 DIAGNOSIS — Z98.890 POST-OPERATIVE STATE: ICD-10-CM

## 2025-05-15 DIAGNOSIS — H25.11 NUCLEAR SCLEROTIC CATARACT OF RIGHT EYE: Primary | ICD-10-CM

## 2025-05-15 DIAGNOSIS — H25.13 NUCLEAR SCLEROSIS, BILATERAL: ICD-10-CM

## 2025-05-15 PROCEDURE — 99152 MOD SED SAME PHYS/QHP 5/>YRS: CPT | Mod: ,,, | Performed by: OPHTHALMOLOGY

## 2025-05-15 PROCEDURE — 36000707: Performed by: OPHTHALMOLOGY

## 2025-05-15 PROCEDURE — 66984 XCAPSL CTRC RMVL W/O ECP: CPT | Mod: 79,RT,, | Performed by: OPHTHALMOLOGY

## 2025-05-15 PROCEDURE — 36000706: Performed by: OPHTHALMOLOGY

## 2025-05-15 PROCEDURE — 99900035 HC TECH TIME PER 15 MIN (STAT)

## 2025-05-15 PROCEDURE — V2632 POST CHMBR INTRAOCULAR LENS: HCPCS | Performed by: OPHTHALMOLOGY

## 2025-05-15 PROCEDURE — 63600175 PHARM REV CODE 636 W HCPCS: Performed by: OPHTHALMOLOGY

## 2025-05-15 PROCEDURE — 25000003 PHARM REV CODE 250: Performed by: OPHTHALMOLOGY

## 2025-05-15 PROCEDURE — 71000015 HC POSTOP RECOV 1ST HR: Performed by: OPHTHALMOLOGY

## 2025-05-15 PROCEDURE — 94761 N-INVAS EAR/PLS OXIMETRY MLT: CPT

## 2025-05-15 DEVICE — IMPLANTABLE DEVICE: Type: IMPLANTABLE DEVICE | Site: EYE | Status: FUNCTIONAL

## 2025-05-15 RX ORDER — MOXIFLOXACIN 5 MG/ML
1 SOLUTION/ DROPS OPHTHALMIC EVERY 5 MIN PRN
Status: COMPLETED | OUTPATIENT
Start: 2025-05-15 | End: 2025-05-15

## 2025-05-15 RX ORDER — PROPARACAINE HYDROCHLORIDE 5 MG/ML
1 SOLUTION/ DROPS OPHTHALMIC
Status: DISCONTINUED | OUTPATIENT
Start: 2025-05-15 | End: 2025-05-15 | Stop reason: HOSPADM

## 2025-05-15 RX ORDER — PHENYLEPHRINE HYDROCHLORIDE 100 MG/ML
1 SOLUTION/ DROPS OPHTHALMIC
Status: DISCONTINUED | OUTPATIENT
Start: 2025-05-15 | End: 2025-05-15 | Stop reason: HOSPADM

## 2025-05-15 RX ORDER — FENTANYL CITRATE 50 UG/ML
25 INJECTION, SOLUTION INTRAMUSCULAR; INTRAVENOUS
Status: DISCONTINUED | OUTPATIENT
Start: 2025-05-15 | End: 2025-05-15 | Stop reason: HOSPADM

## 2025-05-15 RX ORDER — LIDOCAINE HYDROCHLORIDE 40 MG/ML
INJECTION, SOLUTION RETROBULBAR
Status: DISCONTINUED | OUTPATIENT
Start: 2025-05-15 | End: 2025-05-15 | Stop reason: HOSPADM

## 2025-05-15 RX ORDER — MOXIFLOXACIN 5 MG/ML
SOLUTION/ DROPS OPHTHALMIC
Status: DISCONTINUED | OUTPATIENT
Start: 2025-05-15 | End: 2025-05-15 | Stop reason: HOSPADM

## 2025-05-15 RX ORDER — PROPARACAINE HYDROCHLORIDE 5 MG/ML
SOLUTION/ DROPS OPHTHALMIC
Status: DISCONTINUED | OUTPATIENT
Start: 2025-05-15 | End: 2025-05-15 | Stop reason: HOSPADM

## 2025-05-15 RX ORDER — MIDAZOLAM HYDROCHLORIDE 1 MG/ML
1 INJECTION, SOLUTION INTRAMUSCULAR; INTRAVENOUS
Status: DISCONTINUED | OUTPATIENT
Start: 2025-05-15 | End: 2025-05-15 | Stop reason: HOSPADM

## 2025-05-15 RX ORDER — ACETAMINOPHEN 325 MG/1
650 TABLET ORAL EVERY 4 HOURS PRN
Status: DISCONTINUED | OUTPATIENT
Start: 2025-05-15 | End: 2025-05-15 | Stop reason: HOSPADM

## 2025-05-15 RX ORDER — MOXIFLOXACIN 5 MG/ML
1 SOLUTION/ DROPS OPHTHALMIC
Status: COMPLETED | OUTPATIENT
Start: 2025-05-15 | End: 2025-05-15

## 2025-05-15 RX ORDER — CYCLOP/TROP/PROPA/PHEN/KET/WAT 1-1-0.1%
1 DROPS (EA) OPHTHALMIC (EYE) EVERY 5 MIN PRN
Status: COMPLETED | OUTPATIENT
Start: 2025-05-15 | End: 2025-05-15

## 2025-05-15 RX ADMIN — MOXIFLOXACIN OPHTHALMIC 1 DROP: 5 SOLUTION/ DROPS OPHTHALMIC at 07:05

## 2025-05-15 RX ADMIN — Medication 1 DROP: at 07:05

## 2025-05-15 RX ADMIN — MOXIFLOXACIN 1 DROP: 5 SOLUTION/ DROPS OPHTHALMIC at 09:05

## 2025-05-15 RX ADMIN — MIDAZOLAM HYDROCHLORIDE 2 MG: 1 INJECTION, SOLUTION INTRAMUSCULAR; INTRAVENOUS at 08:05

## 2025-05-15 NOTE — PLAN OF CARE
Pt to preop. VS stable. MD to bedside to consent patient. Periop routine reviewed with patient. Questions encouraged and answered. PIV inserted. Family enrolled in text alert system.

## 2025-05-15 NOTE — DISCHARGE SUMMARY
Outcome: Successful outpatient ophthalmic surgical procedure  Preprinted Instructions given to patient.  Regular diet.  Activity: No restrictions  Meds: see Med Rec  Condition: stable  Follow up: 1 day with Dr Chow  Disposition: Home  Diagnosis: s/p eye surgery  Date of discharge: 05/15/2025

## 2025-05-15 NOTE — DISCHARGE INSTRUCTIONS
CATARACT SURGERY    POST-OPERATIVE INSTRUCTIONS    · Apply drops THREE times a day into operative eye for 30 days.    · DO NOT rub your eye    · Wear protective sunglasses during the day    · Resume moderate activity    · Bathe/shower/wash face normally    · DO NOT apply makeup around the operative eye for 1 week.         You should expect    - Blurry vision and halos for 24-48 hours    - Dilated pupil for 24-48 hours    - Scratchy feeling in the eye for 1-2 days    - Curved shadow in your peripheral vision for 2-3 weeks    - Occasional flickering of lights for up to 1 week    -If you experience severe pain or nausea, please call Dr Chow or the on-call doctor at [phone]    - Plan to see Dr Chow tomorrow .      [facility]    [address] [address]    ** Most patients can drive the next day, but if you do not feel comfortable driving, please arrange for transportation.

## 2025-05-15 NOTE — OP NOTE
SURGEON:  Jean Chow M.D.    PREOPERATIVE DIAGNOSIS:    Nuclear Sclerotic Cataract Right Eye    POSTOPERATIVE DIAGNOSIS:    Nuclear Sclerotic Cataract Right Eye    PROCEDURES:    Phacoemulsification with  intraocular lens, Right eye (74515)  With moderate sedation >10min (01424)    DATE OF SURGERY: 05/15/2025    IMPLANT: diboo 5.0    ANESTHESIA:  Under my direct supervision, intravenous moderate sedation was administered during the course of this procedure, with continuous monitoring of hemodynamic parameters. Total time of sedation and amount of sedatives are documented in the nursing logs.    COMPLICATIONS:  None    ESTIMATED BLOOD LOSS: None    SPECIMENS: None    INDICATIONS:    The patient has a history of painless progressive visual loss and difficulty with activities of daily living, which specifically include difficult driving at night due to glare and difficulty reading small print, secondary to cataract formation.  After a thorough discussion of the risks, benefits, and alternatives to cataract surgery, including, but not limited to, the rare risks of infection, retinal detachment, hemorrhage, need for additional surgery, loss of vision, and even loss of the eye, the patient voices understanding and desires to proceed.    DESCRIPTION OF PROCEDURE:    The patients IOL calculations were reviewed, and the lens selection confirmed.   After verification and marking of the proper eye in the preop holding area, the patient was brought to the operating room in supine position where the eye was prepped and draped in standard sterile fashion with 5% Betadine and a lid speculum placed in the eye.   Topical 4% Lidocaine was used in addition to the preoperative anesthesia and the procedure was begun by the creation of a paracentesis incision through which viscoelastic was used to fill the anterior chamber.  Next, a keratome blade was used to create a triplanar temporal clear corneal incision and a cystotome and Utrata  forceps used to fashion a continuous curvilinear capsulorrhexis.  Hydrodissection was carried out using the Eubanks hydrodissection cannula and the nucleus was found to be mobile.  Phacoemulsification of the nucleus was carried out using a quick chop technique, and all remaining epinuclear and cortical material was removed.  The eye was then reformed with Viscoelastic and the  intraocular lens was implanted into the capsular bag.  All remaining viscoelastics were removed from the eye and at the end of the case the pupil was round, the lens was well-centered within the capsular bag and all wounds were found to be water tight.  Drops of Vigamox and Pred Forte were instilled and a shield was placed over the eye. The patient will follow up with Dr. Chow in the morning.

## 2025-05-16 ENCOUNTER — OFFICE VISIT (OUTPATIENT)
Dept: OPHTHALMOLOGY | Facility: CLINIC | Age: 75
End: 2025-05-16
Payer: COMMERCIAL

## 2025-05-16 DIAGNOSIS — H25.13 NUCLEAR SCLEROSIS, BILATERAL: ICD-10-CM

## 2025-05-16 DIAGNOSIS — Z98.890 POST-OPERATIVE STATE: Primary | ICD-10-CM

## 2025-05-16 PROCEDURE — 99999 PR PBB SHADOW E&M-EST. PATIENT-LVL III: CPT | Mod: PBBFAC,,, | Performed by: OPHTHALMOLOGY

## 2025-05-16 NOTE — PROGRESS NOTES
HPI    Pt is here for 1 Day PO OD.  Pt states vision is better, no complaints.    PROCEDURES:    Phacoemulsification with  intraocular lens, OD  DATE OF SURGERY: 05/15/2025  IMPLANT: diboo 5.0    PGB TID OD  Last edited by Cata Lynn on 5/16/2025  8:23 AM.            Assessment /Plan     For exam results, see Encounter Report.    Post-operative state    Nuclear sclerosis, bilateral      Slit lamp exam:  L/L: nl  K: clear, wound sealed  AC: 1+ cell  Lens: IOL centered and stable    POD1 s/p Phaco/IOL  Appropriate precautions and post op medications reviewed.  Patient instructed to call or come in if symptoms of redness, decreased vision, or pain are experienced.    Excellent result. Plan DFE and MRx in 1 month with optometry.  RTC PRN for any acute changes in vision or pain in the eye.    High myope with retinal degen OS, so needs retina fu

## 2025-05-20 ENCOUNTER — PATIENT MESSAGE (OUTPATIENT)
Dept: ELECTROPHYSIOLOGY | Facility: CLINIC | Age: 75
End: 2025-05-20
Payer: COMMERCIAL

## 2025-05-22 ENCOUNTER — LAB VISIT (OUTPATIENT)
Dept: LAB | Facility: HOSPITAL | Age: 75
End: 2025-05-22
Attending: INTERNAL MEDICINE
Payer: COMMERCIAL

## 2025-05-22 DIAGNOSIS — I48.0 PAF (PAROXYSMAL ATRIAL FIBRILLATION): ICD-10-CM

## 2025-05-22 LAB
ANION GAP (OHS): 6 MMOL/L (ref 8–16)
APTT PPP: 30.7 SECONDS (ref 21–32)
BUN SERPL-MCNC: 16 MG/DL (ref 8–23)
CALCIUM SERPL-MCNC: 9.1 MG/DL (ref 8.7–10.5)
CHLORIDE SERPL-SCNC: 107 MMOL/L (ref 95–110)
CO2 SERPL-SCNC: 25 MMOL/L (ref 23–29)
CREAT SERPL-MCNC: 0.7 MG/DL (ref 0.5–1.4)
ERYTHROCYTE [DISTWIDTH] IN BLOOD BY AUTOMATED COUNT: 16.2 % (ref 11.5–14.5)
GFR SERPLBLD CREATININE-BSD FMLA CKD-EPI: >60 ML/MIN/1.73/M2
GLUCOSE SERPL-MCNC: 65 MG/DL (ref 70–110)
HCT VFR BLD AUTO: 36.7 % (ref 37–48.5)
HGB BLD-MCNC: 11.8 GM/DL (ref 12–16)
INR PPP: 1.2 (ref 0.8–1.2)
MCH RBC QN AUTO: 31.3 PG (ref 27–31)
MCHC RBC AUTO-ENTMCNC: 32.2 G/DL (ref 32–36)
MCV RBC AUTO: 97 FL (ref 82–98)
PLATELET # BLD AUTO: 219 K/UL (ref 150–450)
PMV BLD AUTO: 9.9 FL (ref 9.2–12.9)
POTASSIUM SERPL-SCNC: 4.2 MMOL/L (ref 3.5–5.1)
PROTHROMBIN TIME: 13 SECONDS (ref 9–12.5)
RBC # BLD AUTO: 3.77 M/UL (ref 4–5.4)
SODIUM SERPL-SCNC: 138 MMOL/L (ref 136–145)
WBC # BLD AUTO: 4.64 K/UL (ref 3.9–12.7)

## 2025-05-22 PROCEDURE — 85610 PROTHROMBIN TIME: CPT

## 2025-05-22 PROCEDURE — 36415 COLL VENOUS BLD VENIPUNCTURE: CPT

## 2025-05-22 PROCEDURE — 80048 BASIC METABOLIC PNL TOTAL CA: CPT

## 2025-05-22 PROCEDURE — 85027 COMPLETE CBC AUTOMATED: CPT

## 2025-05-22 PROCEDURE — 85730 THROMBOPLASTIN TIME PARTIAL: CPT

## 2025-05-28 ENCOUNTER — TELEPHONE (OUTPATIENT)
Dept: ELECTROPHYSIOLOGY | Facility: CLINIC | Age: 75
End: 2025-05-28
Payer: COMMERCIAL

## 2025-05-28 NOTE — TELEPHONE ENCOUNTER
Spoke to patient    CONFIRMED procedure arrival time of 5:15 AM on 5/19/2025    Reiterated instructions including:  -Directions to check in desk  -NPO after midnight night prior to procedure  -High importance of HOLDING Metoprolol 2 days prior to procedure. Patient reports she does not take Metoprolol.   -Confirmed compliance of Xarelto. Patient to restart Xarelto 2 weeks prior to procedure. Patient confirms first dose 5/15/2025. Instructed patient to continue to take Xarelto as prescribed this evening. Patient verbalized understanding.   -Pre-procedure LABS Reviewed.   -Confirmed absence  of implanted device/stimulator   -Confirmed no fever, cough, or shortness of breath in the past 30 days  -Confirmed no redness, rash, irritation, or yeast infection to groin area.   -Reviewed current visitor policy    Patient verbalized understanding of above and appreciated the call.

## 2025-05-29 ENCOUNTER — ANESTHESIA EVENT (OUTPATIENT)
Dept: MEDSURG UNIT | Facility: HOSPITAL | Age: 75
End: 2025-05-29
Payer: COMMERCIAL

## 2025-05-29 ENCOUNTER — HOSPITAL ENCOUNTER (INPATIENT)
Facility: HOSPITAL | Age: 75
LOS: 1 days | Discharge: HOME | DRG: 317 | End: 2025-05-29
Attending: INTERNAL MEDICINE | Admitting: INTERNAL MEDICINE
Payer: COMMERCIAL

## 2025-05-29 ENCOUNTER — ANESTHESIA (OUTPATIENT)
Dept: MEDSURG UNIT | Facility: HOSPITAL | Age: 75
End: 2025-05-29
Payer: COMMERCIAL

## 2025-05-29 VITALS
BODY MASS INDEX: 18.26 KG/M2 | RESPIRATION RATE: 19 BRPM | OXYGEN SATURATION: 95 % | DIASTOLIC BLOOD PRESSURE: 58 MMHG | HEIGHT: 60 IN | TEMPERATURE: 98 F | HEART RATE: 67 BPM | WEIGHT: 93 LBS | SYSTOLIC BLOOD PRESSURE: 94 MMHG

## 2025-05-29 DIAGNOSIS — I49.9 ARRHYTHMIA: ICD-10-CM

## 2025-05-29 DIAGNOSIS — I48.91 ATRIAL FIBRILLATION: ICD-10-CM

## 2025-05-29 DIAGNOSIS — I48.0 PAF (PAROXYSMAL ATRIAL FIBRILLATION): ICD-10-CM

## 2025-05-29 DIAGNOSIS — I48.91 AF (ATRIAL FIBRILLATION): ICD-10-CM

## 2025-05-29 LAB
ABORH RETYPE: NORMAL
INDIRECT COOMBS: NORMAL
OHS QRS DURATION: 80 MS
OHS QRS DURATION: 82 MS
OHS QTC CALCULATION: 465 MS
OHS QTC CALCULATION: 534 MS
RH BLD: NORMAL
SPECIMEN OUTDATE: NORMAL

## 2025-05-29 PROCEDURE — 4A023FZ MEASUREMENT OF CARDIAC RHYTHM, PERCUTANEOUS APPROACH: ICD-10-PCS | Performed by: INTERNAL MEDICINE

## 2025-05-29 PROCEDURE — 93005 ELECTROCARDIOGRAM TRACING: CPT

## 2025-05-29 PROCEDURE — 33340 PERQ CLSR TCAT L ATR APNDGE: CPT | Mod: Q0 | Performed by: INTERNAL MEDICINE

## 2025-05-29 PROCEDURE — C1894 INTRO/SHEATH, NON-LASER: HCPCS | Performed by: INTERNAL MEDICINE

## 2025-05-29 PROCEDURE — 51702 INSERT TEMP BLADDER CATH: CPT | Performed by: INTERNAL MEDICINE

## 2025-05-29 PROCEDURE — C1753 CATH, INTRAVAS ULTRASOUND: HCPCS | Performed by: INTERNAL MEDICINE

## 2025-05-29 PROCEDURE — 37000009 HC ANESTHESIA EA ADD 15 MINS: Performed by: INTERNAL MEDICINE

## 2025-05-29 PROCEDURE — 11000001 HC ACUTE MED/SURG PRIVATE ROOM

## 2025-05-29 PROCEDURE — 02583ZZ DESTRUCTION OF CONDUCTION MECHANISM, PERCUTANEOUS APPROACH: ICD-10-PCS | Performed by: INTERNAL MEDICINE

## 2025-05-29 PROCEDURE — C1889 IMPLANT/INSERT DEVICE, NOC: HCPCS | Performed by: INTERNAL MEDICINE

## 2025-05-29 PROCEDURE — 25000003 PHARM REV CODE 250: Performed by: NURSE ANESTHETIST, CERTIFIED REGISTERED

## 2025-05-29 PROCEDURE — 93010 ELECTROCARDIOGRAM REPORT: CPT | Mod: ,,, | Performed by: STUDENT IN AN ORGANIZED HEALTH CARE EDUCATION/TRAINING PROGRAM

## 2025-05-29 PROCEDURE — C1766 INTRO/SHEATH,STRBLE,NON-PEEL: HCPCS | Performed by: INTERNAL MEDICINE

## 2025-05-29 PROCEDURE — C1769 GUIDE WIRE: HCPCS | Performed by: INTERNAL MEDICINE

## 2025-05-29 PROCEDURE — 63600175 PHARM REV CODE 636 W HCPCS: Performed by: NURSE ANESTHETIST, CERTIFIED REGISTERED

## 2025-05-29 PROCEDURE — C1887 CATHETER, GUIDING: HCPCS | Performed by: INTERNAL MEDICINE

## 2025-05-29 PROCEDURE — 25000003 PHARM REV CODE 250: Performed by: INTERNAL MEDICINE

## 2025-05-29 PROCEDURE — 4A0234Z MEASUREMENT OF CARDIAC ELECTRICAL ACTIVITY, PERCUTANEOUS APPROACH: ICD-10-PCS | Performed by: INTERNAL MEDICINE

## 2025-05-29 PROCEDURE — 63600175 PHARM REV CODE 636 W HCPCS: Performed by: INTERNAL MEDICINE

## 2025-05-29 PROCEDURE — C1730 CATH, EP, 19 OR FEW ELECT: HCPCS | Performed by: INTERNAL MEDICINE

## 2025-05-29 PROCEDURE — 93010 ELECTROCARDIOGRAM REPORT: CPT | Mod: ,,, | Performed by: INTERNAL MEDICINE

## 2025-05-29 PROCEDURE — 27201423 OPTIME MED/SURG SUP & DEVICES STERILE SUPPLY: Performed by: INTERNAL MEDICINE

## 2025-05-29 PROCEDURE — 02K83ZZ MAP CONDUCTION MECHANISM, PERCUTANEOUS APPROACH: ICD-10-PCS | Performed by: INTERNAL MEDICINE

## 2025-05-29 PROCEDURE — 86920 COMPATIBILITY TEST SPIN: CPT | Performed by: NURSE PRACTITIONER

## 2025-05-29 PROCEDURE — 33340 PERQ CLSR TCAT L ATR APNDGE: CPT | Mod: Q0,,, | Performed by: INTERNAL MEDICINE

## 2025-05-29 PROCEDURE — 86900 BLOOD TYPING SEROLOGIC ABO: CPT | Performed by: NURSE PRACTITIONER

## 2025-05-29 PROCEDURE — 93656 COMPRE EP EVAL ABLTJ ATR FIB: CPT | Performed by: INTERNAL MEDICINE

## 2025-05-29 PROCEDURE — 25500020 PHARM REV CODE 255: Performed by: INTERNAL MEDICINE

## 2025-05-29 PROCEDURE — 02L73DK OCCLUSION OF LEFT ATRIAL APPENDAGE WITH INTRALUMINAL DEVICE, PERCUTANEOUS APPROACH: ICD-10-PCS | Performed by: INTERNAL MEDICINE

## 2025-05-29 PROCEDURE — 37000008 HC ANESTHESIA 1ST 15 MINUTES: Performed by: INTERNAL MEDICINE

## 2025-05-29 PROCEDURE — C1733 CATH, EP, OTHR THAN COOL-TIP: HCPCS | Performed by: INTERNAL MEDICINE

## 2025-05-29 PROCEDURE — C1760 CLOSURE DEV, VASC: HCPCS | Performed by: INTERNAL MEDICINE

## 2025-05-29 PROCEDURE — 93656 COMPRE EP EVAL ABLTJ ATR FIB: CPT | Mod: ,,, | Performed by: INTERNAL MEDICINE

## 2025-05-29 DEVICE — LEFT ATRIAL APPENDAGE CLOSURE DEVICE WITH DELIVERY SYSTEM
Type: IMPLANTABLE DEVICE | Site: HEART | Status: FUNCTIONAL
Brand: WATCHMAN FLX™ PRO

## 2025-05-29 RX ORDER — FENTANYL CITRATE 50 UG/ML
25 INJECTION, SOLUTION INTRAMUSCULAR; INTRAVENOUS EVERY 5 MIN PRN
Status: DISCONTINUED | OUTPATIENT
Start: 2025-05-29 | End: 2025-05-29 | Stop reason: HOSPADM

## 2025-05-29 RX ORDER — HYDROCODONE BITARTRATE AND ACETAMINOPHEN 500; 5 MG/1; MG/1
TABLET ORAL
Status: DISCONTINUED | OUTPATIENT
Start: 2025-05-29 | End: 2025-05-29 | Stop reason: HOSPADM

## 2025-05-29 RX ORDER — GLUCAGON 1 MG
1 KIT INJECTION
Status: DISCONTINUED | OUTPATIENT
Start: 2025-05-29 | End: 2025-05-29 | Stop reason: HOSPADM

## 2025-05-29 RX ORDER — ONDANSETRON HYDROCHLORIDE 2 MG/ML
4 INJECTION, SOLUTION INTRAVENOUS DAILY PRN
Status: DISCONTINUED | OUTPATIENT
Start: 2025-05-29 | End: 2025-05-29 | Stop reason: HOSPADM

## 2025-05-29 RX ORDER — HEPARIN SOD,PORCINE/0.9 % NACL 1000/500ML
INTRAVENOUS SOLUTION INTRAVENOUS
Status: DISCONTINUED | OUTPATIENT
Start: 2025-05-29 | End: 2025-05-29 | Stop reason: HOSPADM

## 2025-05-29 RX ORDER — VASOPRESSIN 20 [USP'U]/ML
INJECTION, SOLUTION INTRAMUSCULAR; SUBCUTANEOUS
Status: DISCONTINUED | OUTPATIENT
Start: 2025-05-29 | End: 2025-05-29

## 2025-05-29 RX ORDER — ONDANSETRON HYDROCHLORIDE 2 MG/ML
INJECTION, SOLUTION INTRAVENOUS
Status: DISCONTINUED | OUTPATIENT
Start: 2025-05-29 | End: 2025-05-29

## 2025-05-29 RX ORDER — DEXMEDETOMIDINE HYDROCHLORIDE 100 UG/ML
INJECTION, SOLUTION INTRAVENOUS
Status: DISCONTINUED | OUTPATIENT
Start: 2025-05-29 | End: 2025-05-29

## 2025-05-29 RX ORDER — FENTANYL CITRATE 50 UG/ML
INJECTION, SOLUTION INTRAMUSCULAR; INTRAVENOUS
Status: DISCONTINUED | OUTPATIENT
Start: 2025-05-29 | End: 2025-05-29

## 2025-05-29 RX ORDER — PROTAMINE SULFATE 10 MG/ML
INJECTION, SOLUTION INTRAVENOUS
Status: DISCONTINUED | OUTPATIENT
Start: 2025-05-29 | End: 2025-05-29

## 2025-05-29 RX ORDER — ATROPINE SULFATE 0.4 MG/ML
INJECTION, SOLUTION ENDOTRACHEAL; INTRAMEDULLARY; INTRAMUSCULAR; INTRAVENOUS; SUBCUTANEOUS
Status: DISCONTINUED | OUTPATIENT
Start: 2025-05-29 | End: 2025-05-29

## 2025-05-29 RX ORDER — DEXAMETHASONE SODIUM PHOSPHATE 4 MG/ML
INJECTION, SOLUTION INTRA-ARTICULAR; INTRALESIONAL; INTRAMUSCULAR; INTRAVENOUS; SOFT TISSUE
Status: DISCONTINUED | OUTPATIENT
Start: 2025-05-29 | End: 2025-05-29

## 2025-05-29 RX ORDER — HEPARIN SODIUM 1000 [USP'U]/ML
INJECTION, SOLUTION INTRAVENOUS; SUBCUTANEOUS
Status: DISCONTINUED | OUTPATIENT
Start: 2025-05-29 | End: 2025-05-29

## 2025-05-29 RX ORDER — HEPARIN SODIUM 10000 [USP'U]/100ML
INJECTION, SOLUTION INTRAVENOUS CONTINUOUS PRN
Status: DISCONTINUED | OUTPATIENT
Start: 2025-05-29 | End: 2025-05-29

## 2025-05-29 RX ORDER — IODIXANOL 320 MG/ML
INJECTION, SOLUTION INTRAVASCULAR
Status: DISCONTINUED | OUTPATIENT
Start: 2025-05-29 | End: 2025-05-29 | Stop reason: HOSPADM

## 2025-05-29 RX ORDER — PROPOFOL 10 MG/ML
VIAL (ML) INTRAVENOUS
Status: DISCONTINUED | OUTPATIENT
Start: 2025-05-29 | End: 2025-05-29

## 2025-05-29 RX ORDER — PHENYLEPHRINE HYDROCHLORIDE 10 MG/ML
INJECTION INTRAVENOUS
Status: DISCONTINUED | OUTPATIENT
Start: 2025-05-29 | End: 2025-05-29

## 2025-05-29 RX ORDER — ACETAMINOPHEN 325 MG/1
650 TABLET ORAL EVERY 4 HOURS PRN
Status: DISCONTINUED | OUTPATIENT
Start: 2025-05-29 | End: 2025-05-29 | Stop reason: HOSPADM

## 2025-05-29 RX ORDER — LIDOCAINE HYDROCHLORIDE 20 MG/ML
INJECTION, SOLUTION INFILTRATION; PERINEURAL
Status: DISCONTINUED | OUTPATIENT
Start: 2025-05-29 | End: 2025-05-29 | Stop reason: HOSPADM

## 2025-05-29 RX ORDER — ROCURONIUM BROMIDE 10 MG/ML
INJECTION, SOLUTION INTRAVENOUS
Status: DISCONTINUED | OUTPATIENT
Start: 2025-05-29 | End: 2025-05-29

## 2025-05-29 RX ORDER — CEFAZOLIN 2 G/1
2 INJECTION, POWDER, FOR SOLUTION INTRAMUSCULAR; INTRAVENOUS
Status: DISCONTINUED | OUTPATIENT
Start: 2025-05-29 | End: 2025-05-29 | Stop reason: HOSPADM

## 2025-05-29 RX ORDER — LIDOCAINE HYDROCHLORIDE 20 MG/ML
INJECTION INTRAVENOUS
Status: DISCONTINUED | OUTPATIENT
Start: 2025-05-29 | End: 2025-05-29

## 2025-05-29 RX ADMIN — DEXMEDETOMIDINE 8 MCG: 200 INJECTION, SOLUTION INTRAVENOUS at 11:05

## 2025-05-29 RX ADMIN — PHENYLEPHRINE HYDROCHLORIDE 100 MCG: 10 INJECTION INTRAVENOUS at 10:05

## 2025-05-29 RX ADMIN — PHENYLEPHRINE HYDROCHLORIDE 100 MCG: 10 INJECTION INTRAVENOUS at 08:05

## 2025-05-29 RX ADMIN — DEXAMETHASONE SODIUM PHOSPHATE 4 MG: 4 INJECTION, SOLUTION INTRAMUSCULAR; INTRAVENOUS at 11:05

## 2025-05-29 RX ADMIN — PROTAMINE SULFATE 20 MG: 10 INJECTION, SOLUTION INTRAVENOUS at 11:05

## 2025-05-29 RX ADMIN — FENTANYL CITRATE 25 MCG: 0.05 INJECTION, SOLUTION INTRAMUSCULAR; INTRAVENOUS at 11:05

## 2025-05-29 RX ADMIN — LIDOCAINE HYDROCHLORIDE 100 MG: 20 INJECTION INTRAVENOUS at 07:05

## 2025-05-29 RX ADMIN — ATROPINE SULFATE 0.5 MG: 0.4 INJECTION, SOLUTION INTRAVENOUS at 08:05

## 2025-05-29 RX ADMIN — ROCURONIUM BROMIDE 10 MG: 10 INJECTION INTRAVENOUS at 09:05

## 2025-05-29 RX ADMIN — ROCURONIUM BROMIDE 10 MG: 10 INJECTION INTRAVENOUS at 08:05

## 2025-05-29 RX ADMIN — PROTAMINE SULFATE 5 MG: 10 INJECTION, SOLUTION INTRAVENOUS at 11:05

## 2025-05-29 RX ADMIN — PHENYLEPHRINE HYDROCHLORIDE 100 MCG: 10 INJECTION INTRAVENOUS at 09:05

## 2025-05-29 RX ADMIN — HEPARIN SODIUM 2000 UNITS: 1000 INJECTION, SOLUTION INTRAVENOUS; SUBCUTANEOUS at 10:05

## 2025-05-29 RX ADMIN — HEPARIN SODIUM AND DEXTROSE 12 UNITS/KG/HR: 10000; 5 INJECTION INTRAVENOUS at 08:05

## 2025-05-29 RX ADMIN — HEPARIN SODIUM 1500 UNITS: 1000 INJECTION, SOLUTION INTRAVENOUS; SUBCUTANEOUS at 10:05

## 2025-05-29 RX ADMIN — ROCURONIUM BROMIDE 50 MG: 10 INJECTION INTRAVENOUS at 07:05

## 2025-05-29 RX ADMIN — ONDANSETRON 4 MG: 2 INJECTION INTRAMUSCULAR; INTRAVENOUS at 11:05

## 2025-05-29 RX ADMIN — PROPOFOL 120 MG: 10 INJECTION, EMULSION INTRAVENOUS at 07:05

## 2025-05-29 RX ADMIN — PHENYLEPHRINE HYDROCHLORIDE 0.3 MCG/KG/MIN: 10 INJECTION INTRAVENOUS at 08:05

## 2025-05-29 RX ADMIN — HEPARIN SODIUM 3000 UNITS: 1000 INJECTION, SOLUTION INTRAVENOUS; SUBCUTANEOUS at 08:05

## 2025-05-29 RX ADMIN — VASOPRESSIN 1 UNITS: 20 INJECTION INTRAVENOUS at 10:05

## 2025-05-29 RX ADMIN — PROTAMINE SULFATE 27.5 MG: 10 INJECTION, SOLUTION INTRAVENOUS at 11:05

## 2025-05-29 RX ADMIN — SODIUM CHLORIDE: 0.9 INJECTION, SOLUTION INTRAVENOUS at 07:05

## 2025-05-29 RX ADMIN — HEPARIN SODIUM 6500 UNITS: 1000 INJECTION, SOLUTION INTRAVENOUS; SUBCUTANEOUS at 08:05

## 2025-05-29 RX ADMIN — SUGAMMADEX 200 MG: 100 INJECTION, SOLUTION INTRAVENOUS at 11:05

## 2025-05-29 NOTE — ANESTHESIA PROCEDURE NOTES
Intubation    Date/Time: 5/29/2025 7:40 AM    Performed by: Weston Bergman CRNA  Authorized by: Percy Cueva MD    Intubation:     Induction:  Intravenous    Intubated:  Postinduction    Mask Ventilation:  Easy mask    Attempts:  1    Attempted By:  CRNA    Method of Intubation:  Direct    Blade:  Po 3    Laryngeal View Grade: Grade I - full view of cords      Difficult Airway Encountered?: No      Complications:  None    Airway Device:  Oral endotracheal tube    Airway Device Size:  7.5    Style/Cuff Inflation:  Cuffed    Tube secured:  21    Secured at:  The lips    Placement Verified By:  Capnometry    Complicating Factors:  Small mouth    Findings Post-Intubation:  BS equal bilateral and atraumatic/condition of teeth unchanged  Notes:      Loose bottom teeth.

## 2025-05-29 NOTE — ANESTHESIA PREPROCEDURE EVALUATION
"                                                                                                             05/29/2025  Luly Lora is a 74 y.o., female.  Pre-operative evaluation for Procedure(s) (LRB):  Ablation atrial fibrillation (N/A)  Left atrial appendage closure device (N/A)  Transesophageal echo (EDWINA) intra-procedure log documentation (N/A)    Luly Lora is a 74 y.o. female       Patient Active Problem List   Diagnosis    Left foot drop    Osteoporosis, post-menopausal    Uterovaginal prolapse    History of BCG vaccination    Vaginal atrophy    Adult bronchiectasis    PAF (paroxysmal atrial fibrillation)    Long term (current) use of anticoagulants    Mycobacterium avium infection    Nuclear sclerotic cataract of right eye       Past Surgical History:   Procedure Laterality Date    CATARACT EXTRACTION W/  INTRAOCULAR LENS IMPLANT Left 4/24/2025    Procedure: EXTRACTION, CATARACT, WITH IOL INSERTION;  Surgeon: Jean Chow MD;  Location: [facility] OR;  Service: Ophthalmology;  Laterality: Left;    CATARACT EXTRACTION W/  INTRAOCULAR LENS IMPLANT Right 5/15/2025    Procedure: EXTRACTION, CATARACT, WITH IOL INSERTION;  Surgeon: Jean Chow MD;  Location: [facility] OR;  Service: Ophthalmology;  Laterality: Right;    COLONOSCOPY N/A 2/16/2024    Procedure: COLONOSCOPY;  Surgeon: ALKA Arguelles MD;  Location: [facility] ENDO ([address] [place] [address]);  Service: Endoscopy;  Laterality: N/A;  Ref by Dr DIANA Copelnad, Newly diagnosed AF-Pending Xarelto hold, PEG, portal - PC  ok to hold Xarelto 2 days per Dr Mcknight-GT  2/6/24- precall complete / Pt confirmed Xarelto holding ins.- ERW    EYE SURGERY  2005    ingrown eye lashes BL    WISDOM TOOTH EXTRACTION         Social History[1]    Medications Ordered Prior to Encounter[2]    Review of patient's allergies indicates:  No Known Allergies      CBC: No results for input(s): "WBC", "RBC", "HGB", "HCT", "PLT", "MCV", "MCH", "MCHC" in the last 72 hours.    CMP: No results for input(s): "NA", "K", " ""CL", "CO2", "BUN", "CREATININE", "GLU", "MG", "PHOS", "CALCIUM", "ALBUMIN", "PROT", "ALKPHOS", "ALT", "AST", "BILITOT" in the last 72 hours.    INR  No results for input(s): "PT", "INR", "PROTIME", "APTT" in the last 72 hours.      Diagnostic Studies:    EKG:   Results for orders placed or performed during the hospital encounter of 08/29/24   ECG 12 lead    Collection Time: 08/29/24 10:05 AM   Result Value Ref Range    QRS Duration 78 ms    [facility] QTC Calculation 403 ms    Narrative    Test Reason : A31.0,    Vent. Rate : 054 BPM     Atrial Rate : 054 BPM     P-R Int : 170 ms          QRS Dur : 078 ms      QT Int : 426 ms       P-R-T Axes : 046 071 066 degrees     QTc Int : 403 ms    Sinus bradycardia with sinus arrhythmia  Otherwise normal ECG  When compared with ECG of 05-APR-2024 08:05,  No significant change was found  Confirmed by Bucky Watt MD ([id number]) on 8/29/2024 11:18:06 AM    Referred By: PATRICK REBOLLAR           Confirmed By:Rene Watt MD       TTE:  Results for orders placed or performed during the hospital encounter of 10/06/23   Echo   Result Value Ref Range    BSA 1.45 m2    LVOT stroke volume 76.30 cm3    LVIDd 4.40 3.5 - 6.0 cm    LV Systolic Volume 25.87 mL    LV Systolic Volume Index 17.8 mL/m2    LVIDs 2.65 2.1 - 4.0 cm    LV Diastolic Volume 87.56 mL    LV Diastolic Volume Index 60.39 mL/m2    IVS 0.64 0.6 - 1.1 cm    LVOT diameter 2.00 cm    LVOT area 3.1 cm2    FS 40 28 - 44 %    Left Ventricle Relative Wall Thickness 0.26 cm    PW 0.58 (A) 0.6 - 1.1 cm    LV mass 77.41 g    LV Mass Index 53 g/m2    MV Peak E Carlos 0.72 m/s    TDI LATERAL 0.12 m/s    TDI SEPTAL 0.05 m/s    E/E' ratio 8.47 m/s    MV Peak A Carlos 0.63 m/s    TR Max Carlos 2.15 m/s    E/A ratio 1.14     IVRT 110.37 msec    E wave deceleration time 169.33 msec    LV SEPTAL E/E' RATIO 14.40 m/s    LV LATERAL E/E' RATIO 6.00 m/s    PV Peak S Carlos 0.51 m/s    PV Peak D Carlos 0.43 m/s    Pulm vein S/D ratio 1.19     LVOT peak " "carlos 1.02 m/s    Left Ventricular Outflow Tract Mean Velocity 0.67 cm/s    Left Ventricular Outflow Tract Mean Gradient 2.09 mmHg    LA size 3.11 cm    Left Atrium Minor Axis 4.73 cm    Left Atrium Major Axis 4.49 cm    LA Vol (MOD) 38.11 cm3    ARIE (MOD) 26.3 mL/m2    RV S' 12.24 cm/s    RVOT peak VTI 17.6 cm    RA Major Axis 3.70 cm    AV regurgitation pressure 1/2 time 742.0 ms    AR Max Carlos 3.41 m/s    AV mean gradient 3 mmHg    AV peak gradient 5 mmHg    Ao peak carlos 1.14 m/s    Ao VTI 24.20 cm    LVOT peak VTI 24.30 cm    AV valve area 3.15 cm²    AV Velocity Ratio 0.89     AV index (prosthetic) 1.00     ENOCH by Velocity Ratio 2.81 cm²    Mr max carlos 4.95 m/s    MV stenosis pressure 1/2 time 49.10 ms    MV valve area p 1/2 method 4.48 cm2    Triscuspid Valve Regurgitation Peak Gradient 18 mmHg    PV mean gradient 1 mmHg    PV PEAK VELOCITY 1.03 m/s    PV peak gradient 4 mmHg    RVOT peak carlos 0.78 m/s    STJ 2.74 cm    Ascending aorta 3.10 cm    IVC diameter 1.00 cm    Mean e' 0.09 m/s    ZLVIDS -0.21     ZLVIDD 0.03     ARIE 27.7 mL/m2    LA Vol 40.19 cm3    LA WIDTH 3.3 cm    RVDD 3.50 cm    TAPSE 2.00 cm    RA Width 4.0 cm    Sinus 2.9 cm    TV resting pulmonary artery pressure 21 mmHg    RV TB RVSP 5 mmHg    Est. RA pres 3 mmHg    Narrative      Left Ventricle: The left ventricle is normal in size. Normal wall   thickness. There is normal systolic function with a visually estimated   ejection fraction of 60 - 65%.    Right Ventricle: Normal right ventricular cavity size. Wall thickness   is normal. Systolic function is normal.    Aortic Valve: There is mild aortic valve sclerosis. There is mild   aortic regurgitation.    Mitral Valve: There is mild regurgitation.    Tricuspid Valve: There is mild regurgitation.       No results found for: "EF"   No results found for this or any previous visit.    EDWIAN:  No results found for this or any previous visit.    Stress Test:  No results found for this or any previous " "visit.       LHC:  No results found for this or any previous visit.       PFT:  No results found for: "FEV1", "FVC", "AGC2EHN", "TLC", "DLCO"     ALLERGIES:   Review of patient's allergies indicates:  No Known Allergies  LDA:          Lines/Drains/Airways       None                  Anesthesia Evaluation      Airway   Mallampati: II  TM distance: > 6 cm  Neck ROM: Normal ROM  Dental    (+) Intact    Pulmonary    Cardiovascular   (+) dysrhythmias    Rate: Normal    Neuro/Psych      GI/Hepatic/Renal      Endo/Other    Abdominal                           Pre-op Assessment    I have reviewed the Patient Summary Reports.     I have reviewed the Nursing Notes. I have reviewed the NPO Status.   I have reviewed the Medications.     Review of Systems  Anesthesia Hx:  No problems with previous Anesthesia             Denies Family Hx of Anesthesia complications.    Denies Personal Hx of Anesthesia complications.                    Cardiovascular:         Dysrhythmias atrial fibrillation                                     Pulmonary:  Pulmonary Normal                       Renal/:  Renal/ Normal                 Hepatic/GI:  Hepatic/GI Normal                    Neurological:  Neurology Normal                                      Endocrine:  Endocrine Normal                Physical Exam  General: Well nourished    Airway:  Mallampati: II   Mouth Opening: Normal  TM Distance: > 6 cm  Tongue: Normal  Neck ROM: Normal ROM    Dental:  Intact    Chest/Lungs:  Normal Respiratory Rate    Heart:  Rate: Normal        Anesthesia Plan  Type of Anesthesia, risks & benefits discussed:    Anesthesia Type: Gen ETT  Intra-op Monitoring Plan: Standard ASA Monitors and Art Line  Post Op Pain Control Plan: multimodal analgesia and IV/PO Opioids PRN  Induction:  IV  Airway Plan: Direct, Post-Induction  Informed Consent: Informed consent signed with the Patient and all parties understand the risks and agree with anesthesia plan.  All questions " answered. Patient consented to blood products? Yes  ASA Score: 3  Day of Surgery Review of History & Physical: H&P Update referred to the surgeon/provider.    Ready For Surgery From Anesthesia Perspective.     .           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      Research in Med School for years. - ophth and inf dz, studying antibodies.         Exercise:  Walking 3 days a week, swam in past, resistance machines' dumbbells, table tennis.          Chinese.  Emigrated 1988.         with CAD/CABG.  . OPP     Social Drivers of Health     Financial Resource Strain: Low Risk  (2/9/2024)    Overall Financial Resource Strain (CARDIA)     Difficulty of Paying Living Expenses: Not hard at all   Food Insecurity: Unknown (2/9/2024)    Hunger Vital Sign     Worried About Running Out of Food in the Last Year: Never true   Transportation Needs: No Transportation Needs (2/9/2024)    PRAPARE - Transportation     Lack of Transportation (Medical): No     Lack of Transportation (Non-Medical): No   Physical Activity: Insufficiently Active (2/9/2024)    Exercise Vital Sign     Days of Exercise per Week: 2 days     Minutes of Exercise per Session: 50 min   Stress: No Stress Concern Present (2/9/2024)    [employer] [place] [employer] - Occupational Stress Questionnaire     Feeling of Stress : Only a little   Housing Stability: Low Risk  (2/9/2024)    Housing Stability Vital Sign      Unable to Pay for Housing in the Last Year: No     Number of Places Lived in the Last Year: 1     Unstable Housing in the Last Year: No   [2]   No current facility-administered medications on file prior to encounter.     Current Outpatient Medications on File Prior to Encounter   Medication Sig Dispense Refill    alendronate (FOSAMAX) 70 MG tablet TAKE 1 TABLET BY MOUTH WEEKLY  WITH 8 OZ OF PLAIN WATER 30  MINUTES BEFORE FIRST FOOD, DRINK OR MEDS. STAY UPRIGHT FOR 30  MINS 12 tablet 3    azithromycin (ZITHROMAX) 500 MG tablet Take 1 tablet (500 mg total) by mouth once daily. 30 tablet 5    CALCIUM CARBONATE (CALCIUM 600 ORAL) Take by mouth.      cholecalciferol, vitamin D3, 125 mcg (5,000 unit) Tab Take 5,000 Units by mouth once daily.      CLOFAZIMINE ORAL Take 2 capsules by mouth once daily.      estradioL (ESTRACE) 0.01 % (0.1 mg/gram) vaginal cream 0.5 grams with applicator or dime-sized amount with finger in vagina nightly x 2 weeks, then twice a week thereafter 42.5 g 11    ethambutoL (MYAMBUTOL) 400 MG Tab Take 2 tablets (800 mg total) by mouth once daily. 60 tablet 5    prednisolon/gatiflox/bromfenac (PREDNISOL ACE-GATIFLOX-BROMFEN) 1-0.5-0.075 % DrpS Apply 1 drop to eye 3 (three) times daily. in operative eye for 1 month after surgery 8 mL 3    metoprolol tartrate (LOPRESSOR) 25 MG tablet Take 1 tablet (25 mg total) by mouth daily as needed (Palpitations). 30 tablet 0

## 2025-05-29 NOTE — ANESTHESIA POSTPROCEDURE EVALUATION
Anesthesia Post Evaluation    Patient: Luly Lora    Procedure(s) Performed: Procedure(s) (LRB):  Ablation atrial fibrillation (N/A)  Left atrial appendage closure device (N/A)  Transesophageal echo (EDWINA) intra-procedure log documentation (N/A)    Final Anesthesia Type: general      Patient participation: Yes- Able to Participate  Level of consciousness: awake and alert  Post-procedure vital signs: reviewed and stable  Pain control: Pain has been treated.  Airway patency: patent    PONV status: Absent or treated.  Anesthetic complications: no      Cardiovascular status: hemodynamically stable  Respiratory status: unassisted  Hydration status: euvolemic  Follow-up not needed.              Vitals Value Taken Time   /69 05/29/25 14:00   Temp 36.4 °C (97.6 °F) 05/29/25 14:00   Pulse 68 05/29/25 14:00   Resp 18 05/29/25 14:00   SpO2 95 % 05/29/25 14:00         No case tracking events are documented in the log.      Pain/Casimiro Score: Casimiro Score: 9 (5/29/2025 12:30 PM)

## 2025-05-29 NOTE — TRANSFER OF CARE
Anesthesia Transfer of Care Note    Patient: Luly Lora    Procedure(s) Performed: Procedure(s) (LRB):  Ablation atrial fibrillation (N/A)  Left atrial appendage closure device (N/A)  Transesophageal echo (EDWINA) intra-procedure log documentation (N/A)    Patient location: PACU    Anesthesia Type: general    Transport from OR: Transported from OR on 6-10 L/min O2 by face mask with adequate spontaneous ventilation    Post pain: adequate analgesia    Post assessment: no apparent anesthetic complications and tolerated procedure well    Post vital signs: stable    Level of consciousness: awake and alert    Nausea/Vomiting: no nausea/vomiting    Complications: none    Transfer of care protocol was followed    Last vitals: Visit Vitals  BP (!) 104/57 (BP Location: Left arm, Patient Position: Lying)   Pulse (!) 58   Temp 36.5 °C (97.7 °F) (Temporal)   Resp 17   Ht 5' (1.524 m)   Wt 42.2 kg (93 lb)   SpO2 95%   Breastfeeding No   BMI 18.16 kg/m²

## 2025-05-29 NOTE — ANESTHESIA PROCEDURE NOTES
Arterial    Diagnosis: Atrial fibrilation    Patient location during procedure: done in OR  Timeout: 5/29/2025 7:35 AM  Procedure end time: 5/29/2025 7:45 AM    Staffing  Authorizing Provider: Percy Cueva MD  Performing Provider: Weston Bergman CRNA    Staffing  Performed by: Weston Bergman, CRNA  Authorized by: Percy Cueva MD    Anesthesiologist was present at the time of the procedure.    Preanesthetic Checklist  Completed: patient identified, IV checked, site marked, risks and benefits discussed, surgical consent, monitors and equipment checked, pre-op evaluation, timeout performed and anesthesia consent givenArterial  Skin Prep: chlorhexidine gluconate  Local Infiltration: none  Orientation: left  Location: radial    Catheter Size: 20 G  Catheter placement by Ultrasound guidance. Heme positive aspiration all ports.   Vessel Caliber: small, patent, compressibility normal  Needle advanced into vessel with real time Ultrasound guidance.Insertion Attempts: 1  Assessment  Dressing: secured with tape and tegaderm  Patient: Tolerated well

## 2025-05-30 ENCOUNTER — PATIENT OUTREACH (OUTPATIENT)
Dept: ADMINISTRATIVE | Facility: CLINIC | Age: 75
End: 2025-05-30
Payer: COMMERCIAL

## 2025-05-30 ENCOUNTER — PATIENT MESSAGE (OUTPATIENT)
Dept: ELECTROPHYSIOLOGY | Facility: CLINIC | Age: 75
End: 2025-05-30
Payer: COMMERCIAL

## 2025-06-02 LAB
ABO + RH BLD: NORMAL
ABO + RH BLD: NORMAL
BLD PROD TYP BPU: NORMAL
BLD PROD TYP BPU: NORMAL
BLOOD UNIT EXPIRATION DATE: NORMAL
BLOOD UNIT EXPIRATION DATE: NORMAL
BLOOD UNIT TYPE CODE: 7300
BLOOD UNIT TYPE CODE: 7300
CROSSMATCH INTERPRETATION: NORMAL
CROSSMATCH INTERPRETATION: NORMAL
DISPENSE STATUS: NORMAL
DISPENSE STATUS: NORMAL
UNIT NUMBER: NORMAL
UNIT NUMBER: NORMAL

## 2025-06-06 ENCOUNTER — OFFICE VISIT (OUTPATIENT)
Dept: OPHTHALMOLOGY | Facility: CLINIC | Age: 75
End: 2025-06-06
Payer: COMMERCIAL

## 2025-06-06 ENCOUNTER — CLINICAL SUPPORT (OUTPATIENT)
Dept: OPHTHALMOLOGY | Facility: CLINIC | Age: 75
End: 2025-06-06
Payer: COMMERCIAL

## 2025-06-06 DIAGNOSIS — H44.2A2 LEFT EYE AFFECTED BY DEGENERATIVE MYOPIA WITH CHOROIDAL NEOVASCULARIZATION: ICD-10-CM

## 2025-06-06 DIAGNOSIS — H35.342 MACULAR HOLE, LEFT: Primary | ICD-10-CM

## 2025-06-06 DIAGNOSIS — H25.12 NUCLEAR SCLEROSIS OF LEFT EYE: ICD-10-CM

## 2025-06-06 DIAGNOSIS — H43.813 VITREOUS DEGENERATION OF BOTH EYES: ICD-10-CM

## 2025-06-06 DIAGNOSIS — Z96.1 PSEUDOPHAKIA, RIGHT EYE: ICD-10-CM

## 2025-06-06 PROCEDURE — 99214 OFFICE O/P EST MOD 30 MIN: CPT | Mod: 24,S$GLB,, | Performed by: OPHTHALMOLOGY

## 2025-06-06 PROCEDURE — 92134 CPTRZ OPH DX IMG PST SGM RTA: CPT | Mod: S$GLB,,, | Performed by: OPHTHALMOLOGY

## 2025-06-06 PROCEDURE — G2211 COMPLEX E/M VISIT ADD ON: HCPCS | Mod: S$GLB,,, | Performed by: OPHTHALMOLOGY

## 2025-06-06 PROCEDURE — 1159F MED LIST DOCD IN RCRD: CPT | Mod: CPTII,S$GLB,, | Performed by: OPHTHALMOLOGY

## 2025-06-06 PROCEDURE — 1111F DSCHRG MED/CURRENT MED MERGE: CPT | Mod: CPTII,S$GLB,, | Performed by: OPHTHALMOLOGY

## 2025-06-06 PROCEDURE — 99999 PR PBB SHADOW E&M-EST. PATIENT-LVL II: CPT | Mod: PBBFAC,,, | Performed by: OPHTHALMOLOGY

## 2025-06-06 PROCEDURE — 1160F RVW MEDS BY RX/DR IN RCRD: CPT | Mod: CPTII,S$GLB,, | Performed by: OPHTHALMOLOGY

## 2025-06-09 DIAGNOSIS — I48.0 PAROXYSMAL ATRIAL FIBRILLATION: Primary | ICD-10-CM

## 2025-06-09 PROBLEM — H44.2A2: Status: ACTIVE | Noted: 2025-06-09

## 2025-06-09 PROBLEM — H25.12 NUCLEAR SCLEROSIS OF LEFT EYE: Status: ACTIVE | Noted: 2025-06-09

## 2025-06-09 PROBLEM — Z96.1 PSEUDOPHAKIA, RIGHT EYE: Status: ACTIVE | Noted: 2025-06-09

## 2025-06-09 PROBLEM — H35.342 MACULAR HOLE, LEFT: Status: ACTIVE | Noted: 2025-06-09

## 2025-06-09 PROBLEM — H25.13 NUCLEAR SCLEROSIS, BILATERAL: Status: ACTIVE | Noted: 2025-06-09

## 2025-06-09 PROBLEM — H43.813 VITREOUS DEGENERATION OF BOTH EYES: Status: ACTIVE | Noted: 2025-06-09

## 2025-06-09 NOTE — PROGRESS NOTES
HPI    NP-Oct/Dfe     Pt states she has a history of retinal issues in her left eye.   Pt reports her vision is still blurry after cataract surgery.     PGB-0/3      --Flashes   -Floaters   -Pain     Last edited by Belinda Kenney on 6/6/2025  1:07 PM.         A/P    ICD-10-CM ICD-9-CM   1. Macular hole, left  H35.342 362.54   2. 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 Nuclear sclerosis of left eye  H25.12 366.16   4. Pseudophakia, right eye  Z96.1 V43.1   5. Vitreous degeneration of both eyes  H43.813 379.21       1. Macular hole, left  2. 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 Chow for retina check - Recent notes reviewed   Pt feels vision left eye has been poor for a while, saw team at [facility] many years ago who said similar    Exam notable for JF CNVM complex with SRF, has mac hole centrally  Plan: unclear how long pathology has been present, monitor for now, do not recommend antiVEGF at this time as uncertain if vision would improve with continued treatment in setting of likely chronic mac hole. Recheck 1-2 mo   Amsler precautions discussed    Visit today is associated with current or anticipated ongoing medical care related to this patients single serious condition/complex condition (choroidal neovascularization)     3. Nuclear sclerosis of left eye  4. Pseudophakia, right eye  Good lens position OD, mod NS OS  Plan: Observation for now, likely limited prognosis visually with macula pathology, f/b Dr Chow, if wanting to proceed with CEIOL ok still from retina perspective     5. Vitreous degeneration of both eyes  Peripheral scarring vs lattice-like areas  No adolfo RT/RD noted   Plan: Observation for now  Pathology of PVD, Retinal Tear, Retinal Detachment reviewed in great detail  RD precautions discussed in detail, patient expressed understanding  RTC immediately PRN (especially ANY  change flashes, floaters, vision, visual field)       RTC 1-2 mo DFE/OCTm OU         I saw and examined the patient and reviewed in detail the findings documented. The final examination findings, image interpretations which have been independently interpreted, and plan as documented in the record represent my personal judgment and conclusions.    Jimi Mitchell MD  Vitreoretinal Surgery   [facility]

## 2025-06-19 ENCOUNTER — OFFICE VISIT (OUTPATIENT)
Dept: OPTOMETRY | Facility: CLINIC | Age: 75
End: 2025-06-19
Payer: COMMERCIAL

## 2025-06-19 DIAGNOSIS — H52.13 MYOPIA OF BOTH EYES: Primary | ICD-10-CM

## 2025-06-19 PROCEDURE — 99999 PR PBB SHADOW E&M-EST. PATIENT-LVL III: CPT | Mod: PBBFAC,,, | Performed by: OPTOMETRIST

## 2025-06-19 PROCEDURE — 92015 DETERMINE REFRACTIVE STATE: CPT | Mod: S$GLB,,, | Performed by: OPTOMETRIST

## 2025-06-19 PROCEDURE — 99499 UNLISTED E&M SERVICE: CPT | Mod: S$GLB,,, | Performed by: OPTOMETRIST

## 2025-06-19 RX ORDER — AMOXICILLIN AND CLAVULANATE POTASSIUM 875; 125 MG/1; MG/1
1 TABLET, FILM COATED ORAL 2 TIMES DAILY
COMMUNITY
Start: 2025-04-25

## 2025-06-19 RX ORDER — DICYCLOMINE HYDROCHLORIDE 10 MG/1
10 CAPSULE ORAL
COMMUNITY
Start: 2025-03-10 | End: 2026-03-11

## 2025-06-19 NOTE — PROGRESS NOTES
HPI    Patient is here today for mrx only. Denies pain.   Last edited by Ivelisse Santana on 6/19/2025  9:07 AM.            Assessment /Plan     For exam results, see Encounter Report.    Myopia of both eyes      Eyeglass Final Rx       Eyeglass Final Rx         Sphere Cylinder Axis    Right -1.50 +0.50 110    Left -4.00 +0.75 110      Type: DVO    Expiration Date: 6/19/2026                   Continue care with Dr. Mitchell as scheduled: 8/8/25

## 2025-07-08 ENCOUNTER — TELEPHONE (OUTPATIENT)
Dept: ELECTROPHYSIOLOGY | Facility: CLINIC | Age: 75
End: 2025-07-08
Payer: COMMERCIAL

## 2025-07-08 NOTE — TELEPHONE ENCOUNTER
Spoke to patient     CONFIRMED procedure arrival time of 10:00 AM on 7/10/25    Reiterated instructions including:  -Directions to check in desk  -NPO after midnight night prior to procedure  -Patient allowed to drink water up until 2 hours prior to arrival due to IV fluid shortage.   -Confirmed compliance of Xarelto.   -Pre-procedure LABS N/a   -Confirmed presence of implanted device/stimulator Watchman   -Confirmed no fever, cough, or shortness of breath in the past 30 days  -Confirmed no redness, rash, irritation, or yeast infection to skin area.   -Reviewed current visitor policy    Patient verbalized understanding of above and appreciated the call.

## 2025-07-10 ENCOUNTER — ANESTHESIA EVENT (OUTPATIENT)
Dept: MEDSURG UNIT | Facility: HOSPITAL | Age: 75
End: 2025-07-10
Payer: COMMERCIAL

## 2025-07-10 ENCOUNTER — ANESTHESIA (OUTPATIENT)
Dept: MEDSURG UNIT | Facility: HOSPITAL | Age: 75
End: 2025-07-10
Payer: COMMERCIAL

## 2025-07-10 ENCOUNTER — HOSPITAL ENCOUNTER (OUTPATIENT)
Dept: CARDIOLOGY | Facility: HOSPITAL | Age: 75
Discharge: HOME | End: 2025-07-10
Attending: INTERNAL MEDICINE
Payer: COMMERCIAL

## 2025-07-10 ENCOUNTER — HOSPITAL ENCOUNTER (OUTPATIENT)
Facility: HOSPITAL | Age: 75
Discharge: HOME | End: 2025-07-10
Attending: INTERNAL MEDICINE | Admitting: INTERNAL MEDICINE
Payer: COMMERCIAL

## 2025-07-10 VITALS
HEIGHT: 60 IN | SYSTOLIC BLOOD PRESSURE: 129 MMHG | TEMPERATURE: 97 F | RESPIRATION RATE: 18 BRPM | DIASTOLIC BLOOD PRESSURE: 62 MMHG | BODY MASS INDEX: 18.26 KG/M2 | HEART RATE: 67 BPM | OXYGEN SATURATION: 99 % | WEIGHT: 93 LBS

## 2025-07-10 VITALS
HEIGHT: 60 IN | DIASTOLIC BLOOD PRESSURE: 56 MMHG | SYSTOLIC BLOOD PRESSURE: 95 MMHG | BODY MASS INDEX: 18.26 KG/M2 | WEIGHT: 93 LBS

## 2025-07-10 DIAGNOSIS — Z95.818 PRESENCE OF WATCHMAN LEFT ATRIAL APPENDAGE CLOSURE DEVICE: ICD-10-CM

## 2025-07-10 DIAGNOSIS — I48.0 PAF (PAROXYSMAL ATRIAL FIBRILLATION): ICD-10-CM

## 2025-07-10 DIAGNOSIS — Z95.818 PRESENCE OF WATCHMAN LEFT ATRIAL APPENDAGE CLOSURE DEVICE: Primary | ICD-10-CM

## 2025-07-10 DIAGNOSIS — I48.91 ATRIAL FIBRILLATION: ICD-10-CM

## 2025-07-10 LAB
OHS QRS DURATION: 84 MS
OHS QTC CALCULATION: 480 MS

## 2025-07-10 PROCEDURE — 93320 DOPPLER ECHO COMPLETE: CPT | Mod: 26,,, | Performed by: INTERNAL MEDICINE

## 2025-07-10 PROCEDURE — 93010 ELECTROCARDIOGRAM REPORT: CPT | Mod: ,,, | Performed by: INTERNAL MEDICINE

## 2025-07-10 PROCEDURE — 93320 DOPPLER ECHO COMPLETE: CPT

## 2025-07-10 PROCEDURE — 37000008 HC ANESTHESIA 1ST 15 MINUTES

## 2025-07-10 PROCEDURE — 93325 DOPPLER ECHO COLOR FLOW MAPG: CPT | Mod: 26,,, | Performed by: INTERNAL MEDICINE

## 2025-07-10 PROCEDURE — 37000009 HC ANESTHESIA EA ADD 15 MINS

## 2025-07-10 PROCEDURE — 93312 ECHO TRANSESOPHAGEAL: CPT | Mod: 26,,, | Performed by: INTERNAL MEDICINE

## 2025-07-10 PROCEDURE — 25000003 PHARM REV CODE 250

## 2025-07-10 PROCEDURE — 93005 ELECTROCARDIOGRAM TRACING: CPT

## 2025-07-10 PROCEDURE — 63600175 PHARM REV CODE 636 W HCPCS

## 2025-07-10 RX ORDER — LIDOCAINE HYDROCHLORIDE 20 MG/ML
SOLUTION OROPHARYNGEAL
Status: DISCONTINUED | OUTPATIENT
Start: 2025-07-10 | End: 2025-07-10

## 2025-07-10 RX ORDER — ASPIRIN 81 MG/1
81 TABLET ORAL DAILY
Qty: 90 TABLET | Refills: 3 | Status: SHIPPED | OUTPATIENT
Start: 2025-07-10 | End: 2026-07-10

## 2025-07-10 RX ORDER — PROPOFOL 10 MG/ML
VIAL (ML) INTRAVENOUS
Status: DISCONTINUED | OUTPATIENT
Start: 2025-07-10 | End: 2025-07-10

## 2025-07-10 RX ORDER — CLOPIDOGREL BISULFATE 75 MG/1
75 TABLET ORAL DAILY
Qty: 30 TABLET | Refills: 5 | Status: SHIPPED | OUTPATIENT
Start: 2025-07-10 | End: 2026-01-06

## 2025-07-10 RX ORDER — LIDOCAINE HYDROCHLORIDE 20 MG/ML
INJECTION INTRAVENOUS
Status: DISCONTINUED | OUTPATIENT
Start: 2025-07-10 | End: 2025-07-10

## 2025-07-10 RX ORDER — GLUCAGON 1 MG
1 KIT INJECTION
OUTPATIENT
Start: 2025-07-10

## 2025-07-10 RX ADMIN — PROPOFOL 150 MCG/KG/MIN: 10 INJECTION, EMULSION INTRAVENOUS at 11:07

## 2025-07-10 RX ADMIN — GLYCOPYRROLATE 0.2 MG: 0.2 INJECTION, SOLUTION INTRAMUSCULAR; INTRAVENOUS at 11:07

## 2025-07-10 RX ADMIN — LIDOCAINE HYDROCHLORIDE 100 MG: 20 INJECTION INTRAVENOUS at 11:07

## 2025-07-10 RX ADMIN — SODIUM CHLORIDE: 0.9 INJECTION, SOLUTION INTRAVENOUS at 11:07

## 2025-07-10 RX ADMIN — PROPOFOL 50 MG: 10 INJECTION, EMULSION INTRAVENOUS at 11:07

## 2025-07-10 RX ADMIN — LIDOCAINE HYDROCHLORIDE 15 ML: 20 SOLUTION ORAL at 11:07

## 2025-07-10 NOTE — ASSESSMENT & PLAN NOTE
Pt is 6 weeks s/p LAAO with Watchman device. Here today for EDWINA to evaluate for nanette-device leaks or device associated thrombus  -No absolute contraindications of esophageal stricture, tumor, perforation, laceration,or diverticulum and/or active GI bleed  -The risks, benefits & alternatives of the procedure were explained to the patient.   -The risks of transesophageal echo include but are not limited to:  Dental trauma, esophageal trauma/perforation, bleeding, laryngospasm/brochospasm, aspiration, sore throat/hoarseness, & dislodgement of the endotracheal tube/nasogastric tube (where applicable).    -The risks of ANES monitored sedation include hypotension, respiratory depression, arrhythmias, bronchospasm, & death.    -Informed consent was obtained. The patient is agreeable to proceed with the procedure and all questions and concerns addressed.    Case discussed with an attending in echocardiography lab.    Further recommendations per attending addendum

## 2025-07-10 NOTE — ANESTHESIA PREPROCEDURE EVALUATION
[facility]  Anesthesia Pre-Operative Evaluation         Patient Name/: Luly Lora, 1950  MRN: [id number]    SUBJECTIVE:     Pre-operative evaluation for Procedure(s) (LRB):  Transesophageal echo (EDWINA) intra-procedure log documentation (N/A)     07/10/2025    Luly Lora is a 74 y.o. female     Patient now presents for the above procedure(s).    ________________________________________  Results for orders placed during the hospital encounter of 10/06/23    Echo    Interpretation Summary    Left Ventricle: The left ventricle is normal in size. Normal wall thickness. There is normal systolic function with a visually estimated ejection fraction of 60 - 65%.    Right Ventricle: Normal right ventricular cavity size. Wall thickness is normal. Systolic function is normal.    Aortic Valve: There is mild aortic valve sclerosis. There is mild aortic regurgitation.    Mitral Valve: There is mild regurgitation.    Tricuspid Valve: There is mild regurgitation.    ________________________________________    LDA:        Drips:       Problem List[1]    Review of patient's allergies indicates:  No Known Allergies    Current Inpatient Medications:       Medications Ordered Prior to Encounter[2]    Past Surgical History:   Procedure Laterality Date    ABLATION OF ARRHYTHMOGENIC FOCUS FOR ATRIAL FIBRILLATION N/A 2025    Procedure: Ablation atrial fibrillation;  Surgeon: Pete Sheikh MD;  Location: [facility] EP LAB;  Service: Cardiology;  Laterality: N/A;  AF, EDWINA(cx if SR), PVI/Watchman, PFA, MANE/BSCI, GEN, MB, 3 Prep    CATARACT EXTRACTION W/  INTRAOCULAR LENS IMPLANT Left 2025    Procedure: EXTRACTION, CATARACT, WITH IOL INSERTION;  Surgeon: Jean Chow MD;  Location: [facility] OR;  Service: Ophthalmology;  Laterality: Left;    CATARACT EXTRACTION W/  INTRAOCULAR LENS IMPLANT Right 5/15/2025    Procedure: EXTRACTION, CATARACT, WITH IOL INSERTION;  Surgeon: Jean Chow MD;  Location: [facility];   Service: Ophthalmology;  Laterality: Right;    CLOSURE OF LEFT ATRIAL APPENDAGE USING DEVICE N/A 5/29/2025    Procedure: Left atrial appendage closure device;  Surgeon: Pete Sheikh MD;  Location: [facility] EP LAB;  Service: Cardiology;  Laterality: N/A;    COLONOSCOPY N/A 2/16/2024    Procedure: COLONOSCOPY;  Surgeon: ALKA Arguelles MD;  Location: [facility] ENDO ([address] [place] [address]);  Service: Endoscopy;  Laterality: N/A;  Ref by Dr DIANA Copeland, Newly diagnosed AF-Pending Xarelto hold, PEG, portal - PC  ok to hold Xarelto 2 days per Dr Mcknight-GT  2/6/24- precall complete / Pt confirmed Xarelto holding ins.- ERW    ECHOCARDIOGRAM,TRANSESOPHAGEAL N/A 5/29/2025    Procedure: Transesophageal echo (EDWINA) intra-procedure log documentation;  Surgeon: Carin Ramírez MD;  Location: [facility] EP LAB;  Service: Cardiology;  Laterality: N/A;    EYE SURGERY  2005    ingrown eye lashes BL    WISDOM TOOTH EXTRACTION         Social History:  Tobacco Use: Low Risk  (7/10/2025)    Patient History     Smoking Tobacco Use: Never     Smokeless Tobacco Use: Never     Passive Exposure: Not on file       Alcohol Use: Not At Risk (6/1/2025)    AUDIT-C     Frequency of Alcohol Consumption: Never     Average Number of Drinks: Patient does not drink     Frequency of Binge Drinking: Never       OBJECTIVE:     Vital Signs Range:  BMI Readings from Last 1 Encounters:   07/10/25 18.16 kg/m²       Temp:  [36.2 °C (97.2 °F)]   Pulse:  [54]   Resp:  [12]   BP: ()/(56-60)   SpO2:  [98 %]        Significant Labs:        Component Value Date/Time    WBC 4.64 05/22/2025 0827    HGB 11.8 (L) 05/22/2025 0827    HGB 10.8 (L) 02/07/2025 1414    HGB 12.0 08/20/2024 1019    HCT 36.7 (L) 05/22/2025 0827    HCT 32.2 (L) 02/07/2025 1414    HCT 39.7 08/20/2024 1019    HCT 45 12/20/2023 0236     05/22/2025 0827     08/20/2024 1019     05/22/2025 0827     02/07/2025 1414     08/20/2024 1019    K 4.2 05/22/2025 0827    K 3.8 02/07/2025  1414    K 4.5 08/20/2024 1019     05/22/2025 0827     08/20/2024 1019    CO2 25 05/22/2025 0827    CO2 26 02/07/2025 1414    CO2 27 08/20/2024 1019    GLU 65 (L) 05/22/2025 0827    GLU 88 08/20/2024 1019    BUN 16 05/22/2025 0827    CREATININE 0.7 05/22/2025 0827    CALCIUM 9.1 05/22/2025 0827    CALCIUM 8.2 (L) 02/07/2025 1414    CALCIUM 9.9 08/20/2024 1019    ALBUMIN 2.9 (L) 02/07/2025 1414    ALBUMIN 3.6 08/20/2024 1019    PROT 8.2 08/20/2024 1019    ALKPHOS 76 08/20/2024 1019    BILITOT 0.3 02/07/2025 1414    BILITOT 0.5 08/20/2024 1019    AST 18 02/07/2025 1414    AST 20 08/20/2024 1019    ALT 14 02/07/2025 1414    ALT 17 08/20/2024 1019    INR 1.2 05/22/2025 0827    INR 1.0 02/07/2025 0811    INR 1.9 (H) 07/24/2024 0723        Please see Results Review for additional labs.     Diagnostic Studies: No relevant studies.    EKG:   Results for orders placed or performed during the hospital encounter of 05/29/25   EKG 12-lead    Collection Time: 05/29/25 11:57 AM   Result Value Ref Range    QRS Duration 82 ms    [facility] QTC Calculation 534 ms    Narrative    Test Reason : I48.91,    Vent. Rate :  72 BPM     Atrial Rate :  72 BPM     P-R Int : 188 ms          QRS Dur :  82 ms      QT Int : 488 ms       P-R-T Axes :  78  68  74 degrees    QTcB Int : 534 ms    Normal sinus rhythm with sinus arrhythmia  Prolonged QT  Abnormal ECG  When compared with ECG of 29-May-2025 06:12,  QT has lengthened  Confirmed by Brennan Valente ([id number]) on 5/29/2025 2:09:58 PM    Referred By: NILESH COLBERT           Confirmed By: Brennan Valente       ECHO:  See subjective, if available.      ASSESSMENT/PLAN:                                                                                                                  07/10/2025  Luly Lora is a 74 y.o., female.      Pre-op Assessment          Review of Systems  Hematology/Oncology:                   Hematology Comments: Anticoagulant long-term use                    Pulmonary:          Mycobacterium avium infection                   Physical Exam  General: Well nourished    Airway:  Mallampati: II   Mouth Opening: Normal  TM Distance: > 6 cm  Tongue: Normal  Neck ROM: Normal ROM    Dental:  Intact        Anesthesia Plan  Type of Anesthesia, risks & benefits discussed:    Anesthesia Type: Gen Natural Airway  Intra-op Monitoring Plan: Standard ASA Monitors  Induction:  IV  Informed Consent: Informed consent signed with the Patient and all parties understand the risks and agree with anesthesia plan.  All questions answered.   ASA Score: 3  Day of Surgery Review of History & Physical: H&P Update referred to the surgeon/provider.    Ready For Surgery From Anesthesia Perspective.     .           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     Current Outpatient Medications on File Prior to Encounter   Medication Sig Dispense Refill    azithromycin (ZITHROMAX) 500 MG tablet Take 1 tablet (500 mg total) by mouth once daily. 30 tablet 5    CLOFAZIMINE ORAL Take 2 capsules by mouth once daily.      estradioL (ESTRACE) 0.01 % (0.1 mg/gram) vaginal cream 0.5 grams with applicator or dime-sized amount with finger in vagina nightly x 2 weeks, then twice a week thereafter 42.5 g 11    ethambutoL (MYAMBUTOL) 400 MG Tab Take 2 tablets (800 mg  total) by mouth once daily. 60 tablet 5    alendronate (FOSAMAX) 70 MG tablet TAKE 1 TABLET BY MOUTH WEEKLY  WITH 8 OZ OF PLAIN WATER 30  MINUTES BEFORE FIRST FOOD, DRINK OR MEDS. STAY UPRIGHT FOR 30  MINS 12 tablet 3    CALCIUM CARBONATE (CALCIUM 600 ORAL) Take by mouth.      cholecalciferol, vitamin D3, 125 mcg (5,000 unit) Tab Take 5,000 Units by mouth once daily.      metoprolol tartrate (LOPRESSOR) 25 MG tablet Take 1 tablet (25 mg total) by mouth daily as needed (Palpitations). 30 tablet 0    prednisolon/gatiflox/bromfenac (PREDNISOL ACE-GATIFLOX-BROMFEN) 1-0.5-0.075 % DrpS Apply 1 drop to eye 3 (three) times daily. in operative eye for 1 month after surgery 8 mL 3

## 2025-07-10 NOTE — H&P
[facility]  Cardiology  History and Physical     Patient Name: Luly Lora  MRN: [id number]  Admission Date: 7/10/2025  Code Status: No Order   Attending Provider: Pete Sheikh MD   Primary Care Physician: Elma Copeland MD  Principal Problem:<principal problem not specified>    Patient information was obtained from patient and past medical records.     Subjective:     Chief Complaint:  S/p Watchman device      HPI:  Ms. Lora is a 73 yo female with AF s/p PVI and LAAO with Watchman device on 5/29/25. 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 She is now 6 weeks post LAAO. She has remained on Xarelto. She presents today for follow up EDWINA to assess for any nanette-device leaks.     EDWINA 5/29/25    Left Ventricle: The left ventricle is normal in size. Normal wall thickness. There is normal systolic function. Ejection fraction is approximately 55%.    Right Ventricle: The right ventricle is normal in size. Systolic function is normal.    Left atrium: There is no thrombus in the left atrial appendage.    Aortic Valve: There is mild aortic regurgitation.    Mitral Valve: There is mild regurgitation.     Intervention:  A 27 mm Watchman FLX was inserted but could not be positioned properly. After a new septal puncture a 24 mm Watchman FLX was implanted. After placement and an adequate tug test, there was no significant shoulder, 21-22 % compression and no peridevice leak.  A small iatrogenic defect is present across the atrial septum. No pericardial effusion is identified.     Anticoagulant/antiplatelets: Xarelto 20 mg daily   ECG: Pending    Dysphagia or odynophagia:  No  Liver Disease, esophageal disease, or known varices:  No. Recent EGD with normal esophagus  Upper GI Bleeding: No  Snoring:  No  Sleep Apnea:  No  Prior neck surgery or radiation:  No  Able to move neck in all directions:  Yes  History of anesthetic difficulties:  No  Family history of anesthetic difficulties:   No  Last oral intake: yesterday before midnight  GLP-1 use: None    Platelet count: 219k  INR: 1.2      Past Medical History:   Diagnosis Date    Anticoagulant long-term use     Atrial fibrillation     Brain bleed 1998    Osteoporosis     Pelvic fracture        Past Surgical History:   Procedure Laterality Date    ABLATION OF ARRHYTHMOGENIC FOCUS FOR ATRIAL FIBRILLATION N/A 5/29/2025    Procedure: Ablation atrial fibrillation;  Surgeon: Pete Sheikh MD;  Location: [facility] EP LAB;  Service: Cardiology;  Laterality: N/A;  AF, EDWINA(cx if SR), PVI/Watchman, PFA, MANE/BSCI, GEN, MB, 3 Prep    CATARACT EXTRACTION W/  INTRAOCULAR LENS IMPLANT Left 4/24/2025    Procedure: EXTRACTION, CATARACT, WITH IOL INSERTION;  Surgeon: Jean Chow MD;  Location: [facility] OR;  Service: Ophthalmology;  Laterality: Left;    CATARACT EXTRACTION W/  INTRAOCULAR LENS IMPLANT Right 5/15/2025    Procedure: EXTRACTION, CATARACT, WITH IOL INSERTION;  Surgeon: Jean Chow MD;  Location: [facility] OR;  Service: Ophthalmology;  Laterality: Right;    CLOSURE OF LEFT ATRIAL APPENDAGE USING DEVICE N/A 5/29/2025    Procedure: Left atrial appendage closure device;  Surgeon: Pete Sheikh MD;  Location: [facility] EP LAB;  Service: Cardiology;  Laterality: N/A;    COLONOSCOPY N/A 2/16/2024    Procedure: COLONOSCOPY;  Surgeon: ALKA Arguelles MD;  [facility]  Service: Endoscopy;  Laterality: N/A;  Ref by Dr DIANA Copeland, Newly diagnosed AF-Pending Xarelto hold, PEG, portal - PC  ok to hold Xarelto 2 days per Dr Mcknight-GT  2/6/24- precall complete / Pt confirmed Xarelto holding ins.- ERW    ECHOCARDIOGRAM,TRANSESOPHAGEAL N/A 5/29/2025    Procedure: Transesophageal echo (EDWINA) intra-procedure log documentation;  Surgeon: Carin Ramírez MD;  Location: [facility] LAB;  Service: Cardiology;  Laterality: N/A;    EYE SURGERY  2005    ingrown eye lashes BL    WISDOM TOOTH EXTRACTION         Review of patient's allergies indicates:  No  Known Allergies    No current facility-administered medications on file prior to encounter.     Current Outpatient Medications on File Prior to Encounter   Medication Sig    alendronate (FOSAMAX) 70 MG tablet TAKE 1 TABLET BY MOUTH WEEKLY  WITH 8 OZ OF PLAIN WATER 30  MINUTES BEFORE FIRST FOOD, DRINK OR MEDS. STAY UPRIGHT FOR 30  MINS    azithromycin (ZITHROMAX) 500 MG tablet Take 1 tablet (500 mg total) by mouth once daily.    CALCIUM CARBONATE (CALCIUM 600 ORAL) Take by mouth.    cholecalciferol, vitamin D3, 125 mcg (5,000 unit) Tab Take 5,000 Units by mouth once daily.    CLOFAZIMINE ORAL Take 2 capsules by mouth once daily.    estradioL (ESTRACE) 0.01 % (0.1 mg/gram) vaginal cream 0.5 grams with applicator or dime-sized amount with finger in vagina nightly x 2 weeks, then twice a week thereafter    ethambutoL (MYAMBUTOL) 400 MG Tab Take 2 tablets (800 mg total) by mouth once daily.    metoprolol tartrate (LOPRESSOR) 25 MG tablet Take 1 tablet (25 mg total) by mouth daily as needed (Palpitations).    prednisolon/gatiflox/bromfenac (PREDNISOL ACE-GATIFLOX-BROMFEN) 1-0.5-0.075 % DrpS Apply 1 drop to eye 3 (three) times daily. in operative eye for 1 month after surgery     Family History       Problem Relation (Age of Onset)    Diabetes Mother, Brother    Heart disease Mother    Hypertension Mother, Brother, Brother    Kidney failure Mother    No Known Problems Son    Parkinsonism Father          Tobacco Use    Smoking status: Never    Smokeless tobacco: Never   Vaping Use    Vaping status: Never Used   Substance and Sexual Activity    Alcohol use: Not Currently     Alcohol/week: 1.0 standard drink of alcohol     Types: 1 Glasses of wine per week     Comment: occasionally- 3-4 a month    Drug use: No    Sexual activity: Yes     Partners: Male     Review of Systems   Constitutional: Negative for diaphoresis, malaise/fatigue, weight gain and weight loss.   HENT:  Negative for nosebleeds.    Eyes:  Negative for vision  loss in left eye, vision loss in right eye and visual disturbance.   Cardiovascular:  Negative for chest pain, claudication, dyspnea on exertion, irregular heartbeat, leg swelling, near-syncope, orthopnea, palpitations, paroxysmal nocturnal dyspnea and syncope.   Respiratory:  Negative for cough, shortness of breath, sleep disturbances due to breathing, snoring and wheezing.    Hematologic/Lymphatic: Negative for bleeding problem. Does not bruise/bleed easily.   Skin:  Negative for poor wound healing and rash.   Musculoskeletal:  Negative for muscle cramps and myalgias.   Gastrointestinal:  Negative for bloating, abdominal pain, diarrhea, heartburn, melena, nausea and vomiting.   Genitourinary:  Negative for hematuria and nocturia.   Neurological:  Negative for brief paralysis, dizziness, headaches, light-headedness, numbness and weakness.   Psychiatric/Behavioral:  Negative for depression.    Allergic/Immunologic: Negative for hives.     Objective:     Vital Signs (Most Recent):    Vital Signs (24h Range):           There is no height or weight on file to calculate BMI.            No intake or output data in the 24 hours ending 07/10/25 1016    Lines/Drains/Airways       None                    Physical Exam  Vitals and nursing note reviewed.   Constitutional:       Appearance: She is well-developed. She is not diaphoretic.   HENT:      Head: Normocephalic and atraumatic.   Eyes:      Conjunctiva/sclera: Conjunctivae normal.   Neck:      Vascular: No carotid bruit or JVD.   Cardiovascular:      Rate and Rhythm: Normal rate and regular rhythm.      Pulses: Normal pulses and intact distal pulses.      Heart sounds: Normal heart sounds. No murmur heard.     No friction rub. No gallop.   Pulmonary:      Effort: Pulmonary effort is normal.      Breath sounds: Normal breath sounds. No rales.   Chest:      Chest wall: No tenderness.   Abdominal:      General: There is no distension.      Palpations: Abdomen is soft. There  is no mass.      Tenderness: There is no abdominal tenderness.   Skin:     General: Skin is warm and dry.      Coloration: Skin is not pale.   Neurological:      Mental Status: She is alert and oriented to person, place, and time.          Significant Labs: All pertinent lab results from the last 24 hours have been reviewed.    Assessment and Plan:     Presence of Watchman left atrial appendage closure device  Pt is 6 weeks s/p LAAO with Watchman device. Here today for EDWINA to evaluate for nanette-device leaks or device associated thrombus  -No absolute contraindications of esophageal stricture, tumor, perforation, laceration,or diverticulum and/or active GI bleed  -The risks, benefits & alternatives of the procedure were explained to the patient.   -The risks of transesophageal echo include but are not limited to:  Dental trauma, esophageal trauma/perforation, bleeding, laryngospasm/brochospasm, aspiration, sore throat/hoarseness, & dislodgement of the endotracheal tube/nasogastric tube (where applicable).    -The risks of ANES monitored sedation include hypotension, respiratory depression, arrhythmias, bronchospasm, & death.    -Informed consent was obtained. The patient is agreeable to proceed with the procedure and all questions and concerns addressed.    Case discussed with an attending in echocardiography lab.    Further recommendations per attending addendum         VTE Risk Mitigation (From admission, onward)      None            Susana Danielson PA-C  Cardiology   [facility]

## 2025-07-10 NOTE — NURSING
Received report from ERIN Egan and ROSSI Fleming. Patient s/p EDWINA, AAOx3. VSS, no c/o pain or discomfort at this time, resp even and unlabored. Post procedure protocol reviewed with patient. Understanding verbalized. Patient's spouse updated via teelephone. Nurse call bell within reach.

## 2025-07-10 NOTE — DISCHARGE SUMMARY
[facility]  Cardiology  Discharge Summary      Patient Name: Luly Lora  MRN: [id number]  Admission Date: 7/10/2025  Hospital Length of Stay: 0 days  Discharge Date and Time: 07/10/2025 12:02 PM  Attending Physician: Pete Sheikh MD    Discharging Provider: Susana Danielson PA-C  Primary Care Physician: Elma Copeland MD    HPI:   Ms. Lora is a 75 yo female with AF s/p PVI and LAAO with Watchman device on 5/29/25. 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 She is now 6 weeks post LAAO. She has remained on Xarelto. She presents today for follow up EDWINA to assess for any nanette-device leaks.     EDWINA 5/29/25    Left Ventricle: The left ventricle is normal in size. Normal wall thickness. There is normal systolic function. Ejection fraction is approximately 55%.    Right Ventricle: The right ventricle is normal in size. Systolic function is normal.    Left atrium: There is no thrombus in the left atrial appendage.    Aortic Valve: There is mild aortic regurgitation.    Mitral Valve: There is mild regurgitation.     Intervention:  A 27 mm Watchman FLX was inserted but could not be positioned properly. After a new septal puncture a 24 mm Watchman FLX was implanted. After placement and an adequate tug test, there was no significant shoulder, 21-22 % compression and no peridevice leak.  A small iatrogenic defect is present across the atrial septum. No pericardial effusion is identified.     Anticoagulant/antiplatelets: Xarelto 20 mg daily   ECG: Pending    Dysphagia or odynophagia:  No  Liver Disease, esophageal disease, or known varices:  No. Recent EGD with normal esophagus  Upper GI Bleeding: No  Snoring:  No  Sleep Apnea:  No  Prior neck surgery or radiation:  No  Able to move neck in all directions:  Yes  History of anesthetic difficulties:  No  Family history of anesthetic difficulties:  No  Last oral intake: yesterday before midnight  GLP-1 use: None    Platelet  Outpatient Physical Therapy  [facility]           [] Phone: [phone]   Fax: [phone]  [facility]           [x] Phone: [phone]   Fax: [phone]        Physical Therapy Daily Treatment Note  Date:  2023    Patient Name:  Selina Barnard \"Luis\"  Ermias Trivedi    :  1936  MRN: [id number]  Restrictions/Precautions: fall risk  Diagnosis:   Aftercare following right knee joint replacement surgery [Z47.1, Z96.651]    Date of Injury/Surgery: R TKA 23  pt was in ARU from -5/15/23. Treatment Diagnosis:   M62.81 muscle weakness, R26.89 abnormality of gait  Insurance/Certification information:  Medicare  Referring Physician:  Darek Gonsalves MD     PCP: Madison Sue MD  Plan of care signed (Y/N):  rodrick vitale sign sent  Outcome Measure: 2MWT: 296.8 feet  Visit# / total visits:   7/10 then PN  Pain level: 0/10   Goals:     Patient goals:  to get around better and ambulate with no AD    Long term goals to be achieved by 2023:   Long term goal 1: Pt will demonstrate I with current HEP as prescribed in order to increase ROM and strength. Long term goal 2: Pt will demonstrate R knee extension to 0 degrees in order to improve ROM. Long term goal 3: Pt will demonstrate R knee flexion to 130 degrees in order to improve ROM. Long term goal 4: Pt will demonstrate the ability to safely complete at least 300 feet in the 2 MWT with no AD in order to improve functional mobility. Subjective:    Patient reports he is walking without cane @ home some      Any changes in Ambulatory Summary Sheet?   None    Objective:   TUG 13 sec without cane    Exercises: (No more than 4 columns)   Exercise/Equipment Date: 23  (6) 23       Take shoe off when on table    WARM UP       bike   X10' S5 Lvl 3 S5 Lv3 10' S5 x10' L3         TABLE      Quad set w/ 1/2 FR under heel 1x10 10\" 1/2 FR  1x10 10\" 1/2 FR  1x12 10\" 1/2 FR   SLR initiate w/DFand QS 1x15 15x 2 x10   Side lying ABD 1x10 R LE 10x 15 x R count: 219k  INR: 1.2      Procedure(s) (LRB):  Transesophageal echo (EDWINA) intra-procedure log documentation (N/A)     Indwelling Lines/Drains at time of discharge:  Lines/Drains/Airways       None                   Hospital Course:  Patient underwent EDWINA showing Watchman device without any significant nanette-device leaks or device associated thrombus. Patient tolerated the procedure without any acute complications. Plan to stop Xarelto and start dual antiplatelet therapy with Aspirin 81 mg daily and Plavix 75 mg daily. Once she is 6 month post device implantation, she can stop Plavix and continue on Aspirin monotherapy. Instructed to follow up with Dr. Sheikh as scheduled.   Patient was assessed at bedside prior to discharge, they reported feeling well and denied chest discomfort, shortness of breath, palpitations, lightheadedness, or any other acute symptoms. Discharge instructions were discussed with patient and all questions were answered. Patient was discharged home in stable condition.        Significant Diagnostic Studies: EDWINA - final report pending - prelim no nanette-device leaks or device associated thrombus    Pending Diagnostic Studies:       None            Final Active Diagnoses:    Diagnosis Date Noted POA    Presence of Watchman left atrial appendage closure device [Z95.818] 07/10/2025 Yes      Problems Resolved During this Admission:     No new Assessment & Plan notes have been filed under this hospital service since the last note was generated.  Service: Cardiology      Discharged Condition: stable    Disposition: Home or Self Care    Follow Up:   Follow-up Information       Pete Sheikh MD. Go on 8/26/2025.    Specialties: Electrophysiology, Cardiovascular Disease, Cardiology  Contact information:  [address] [place] [address] [id number] [phone]                           Patient Instructions:   No discharge procedures on file.  Medications:  Reconciled Home Medications:      Medication  List        START taking these medications      aspirin 81 MG EC tablet  Commonly known as: ECOTRIN  Take 1 tablet (81 mg total) by mouth once daily.     clopidogreL 75 mg tablet  Commonly known as: PLAVIX  Take 1 tablet (75 mg total) by mouth once daily.            CONTINUE taking these medications      alendronate 70 MG tablet  Commonly known as: FOSAMAX  TAKE 1 TABLET BY MOUTH WEEKLY  WITH 8 OZ OF PLAIN WATER 30  MINUTES BEFORE FIRST FOOD, DRINK OR MEDS. STAY UPRIGHT FOR 30  MINS     amoxicillin-clavulanate 875-125mg 875-125 mg per tablet  Commonly known as: AUGMENTIN  Take 1 tablet by mouth 2 (two) times daily.     azithromycin 500 MG tablet  Commonly known as: ZITHROMAX  Take 1 tablet (500 mg total) by mouth once daily.     CALCIUM 600 ORAL  Take by mouth.     cholecalciferol (vitamin D3) 125 mcg (5,000 unit) Tab  Take 5,000 Units by mouth once daily.     CLOFAZIMINE ORAL  Take 2 capsules by mouth once daily.     dicyclomine 10 MG capsule  Commonly known as: BENTYL  Take 10 mg by mouth.     estradioL 0.01 % (0.1 mg/gram) vaginal cream  Commonly known as: ESTRACE  0.5 grams with applicator or dime-sized amount with finger in vagina nightly x 2 weeks, then twice a week thereafter     ethambutoL 400 MG Tab  Commonly known as: MYAMBUTOL  Take 2 tablets (800 mg total) by mouth once daily.     metoprolol tartrate 25 MG tablet  Commonly known as: LOPRESSOR  Take 1 tablet (25 mg total) by mouth daily as needed (Palpitations).     prednisol ace-gatiflox-bromfen 1-0.5-0.075 % Drps  Apply 1 drop to eye 3 (three) times daily. in operative eye for 1 month after surgery            STOP taking these medications      rivaroxaban 20 mg Tab  Commonly known as: XARELTO              Time spent on the discharge of patient: 35 minutes    Susana Danielson PA-C  Cardiology  [facility]

## 2025-07-10 NOTE — NURSING
Patient discharged per MD orders. Instructions given on medications, wound care, activity, signs of infection, when to call MD, and follow up appointments. Pt and spouse verbalized understanding. Telemetry and PIV removed. Patient's  transported pt off of unit via wheelchair.

## 2025-07-10 NOTE — HOSPITAL COURSE
Patient underwent EDWINA showing Watchman device without any significant nanette-device leaks or device associated thrombus. Patient tolerated the procedure without any acute complications. Plan to stop Xarelto and start dual antiplatelet therapy with Aspirin 81 mg daily and Plavix 75 mg daily. Once she is 6 month post device implantation, she can stop Plavix and continue on Aspirin monotherapy. Instructed to follow up with Dr. Sheikh as scheduled.   Patient was assessed at bedside prior to discharge, they reported feeling well and denied chest discomfort, shortness of breath, palpitations, lightheadedness, or any other acute symptoms. Discharge instructions were discussed with patient and all questions were answered. Patient was discharged home in stable condition.

## 2025-07-10 NOTE — HPI
Ms. Lora is a 73 yo female with AF s/p PVI and LAAO with Watchman device on 5/29/25. 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 She is now 6 weeks post LAAO. She has remained on Xarelto. She presents today for follow up EDWINA to assess for any nanette-device leaks.     EDWINA 5/29/25    Left Ventricle: The left ventricle is normal in size. Normal wall thickness. There is normal systolic function. Ejection fraction is approximately 55%.    Right Ventricle: The right ventricle is normal in size. Systolic function is normal.    Left atrium: There is no thrombus in the left atrial appendage.    Aortic Valve: There is mild aortic regurgitation.    Mitral Valve: There is mild regurgitation.     Intervention:  A 27 mm Watchman FLX was inserted but could not be positioned properly. After a new septal puncture a 24 mm Watchman FLX was implanted. After placement and an adequate tug test, there was no significant shoulder, 21-22 % compression and no peridevice leak.  A small iatrogenic defect is present across the atrial septum. No pericardial effusion is identified.     Anticoagulant/antiplatelets: Xarelto 20 mg daily   ECG: Pending    Dysphagia or odynophagia:  No  Liver Disease, esophageal disease, or known varices:  No. Recent EGD with normal esophagus  Upper GI Bleeding: No  Snoring:  No  Sleep Apnea:  No  Prior neck surgery or radiation:  No  Able to move neck in all directions:  Yes  History of anesthetic difficulties:  No  Family history of anesthetic difficulties:  No  Last oral intake: yesterday before midnight  GLP-1 use: None    Platelet count: 219k  INR: 1.2

## 2025-07-10 NOTE — ANESTHESIA POSTPROCEDURE EVALUATION
Anesthesia Post Evaluation    Patient: Luly Lora    Procedure(s) Performed: Procedure(s) (LRB):  Transesophageal echo (EDWINA) intra-procedure log documentation (N/A)    Final Anesthesia Type: general      Patient location during evaluation: PACU  Patient participation: Yes- Able to Participate  Level of consciousness: awake and alert  Post-procedure vital signs: reviewed and stable  Airway patency: patent    PONV status at discharge: No PONV  Anesthetic complications: no      Cardiovascular status: blood pressure returned to baseline  Respiratory status: unassisted  Hydration status: euvolemic  Follow-up not needed.              Vitals Value Taken Time   /62 07/10/25 12:29   Temp 36.2 °C (97.2 °F) 07/10/25 11:45   Pulse 68 07/10/25 12:20   Resp 21 07/10/25 12:20   SpO2 99 % 07/10/25 12:19   Vitals shown include unfiled device data.      No case tracking events are documented in the log.      Pain/Casimiro Score: Casimiro Score: 10 (7/10/2025 12:29 PM)

## 2025-07-10 NOTE — TRANSFER OF CARE
Anesthesia Transfer of Care Note    Patient: Luly Lora    Procedure(s) Performed: Procedure(s) (LRB):  Transesophageal echo (EDWINA) intra-procedure log documentation (N/A)    Patient location: [facility]    Anesthesia Type: general    Transport from OR: Transported from OR on 2-3 L/min O2 by NC with adequate spontaneous ventilation    Post pain: adequate analgesia    Post assessment: no apparent anesthetic complications    Post vital signs: stable    Level of consciousness: sedated    Nausea/Vomiting: no nausea/vomiting    Complications: none    Transfer of care protocol was followed      Last vitals: Visit Vitals  /78   Pulse 65   Temp 36.2 °C (97.2 °F) (Temporal)   Resp 12   Ht 5' (1.524 m)   Wt 42.2 kg (93 lb)   SpO2 100%   Breastfeeding No   BMI 18.16 kg/m²

## 2025-07-10 NOTE — DISCHARGE INSTRUCTIONS
Medications:  -Continue to take your home medications as listed on your medication list after you are discharged.    New Medications:  - START taking Plavix (Clopidogrel) 75 mg daily and Aspirin 81 mg daily (you can get this over the counter if you prefer). Please start taking these medications today.  - STOP taking Xarelto   -On 11/29/25, you will be able to stop Clopidogrel, and can continue with just baby Aspirin daily.     Follow up: in clinic with Dr. Sheikh as scheduled.    Diet  -You may resume oral intake after you are discharged, as long you have no swallowing difficulties.    Because you have received sedation for this procedure:  -Limit activity for the remainder of the day.  -Do not smoke for at least 6 hours and until you are fully awake and alert.  -Do not drink alcoholic beverage for 24 hours.  -Do not drive for 24 hours.  -Defer important decision making until the following day.     Go to the Emergency Department if you develop:   -Bleeding  -Weakness or numbness  -Visual, gait or speech disturbance  -New chest pain, palpitations, shortness of breath, rapid heart beat, or fainting  -Fever

## 2025-07-10 NOTE — NURSING
Pt prepped in room 10 on SSCU. Pt is AAOx4 and follows commands appropriately. VS taken and wnl and pt is free from s/s of pain/distress. IV started, 12 lead EKG completed, and preop questions completed. Consents completed and verified. Safety measures in place. Pt's  is at bedside.

## 2025-07-10 NOTE — SUBJECTIVE & OBJECTIVE
Past Medical History:   Diagnosis Date    Anticoagulant long-term use     Atrial fibrillation     Brain bleed 1998    Osteoporosis     Pelvic fracture        Past Surgical History:   Procedure Laterality Date    ABLATION OF ARRHYTHMOGENIC FOCUS FOR ATRIAL FIBRILLATION N/A 5/29/2025    Procedure: Ablation atrial fibrillation;  Surgeon: Pete Sheikh MD;  Location: [facility] EP LAB;  Service: Cardiology;  Laterality: N/A;  AF, EDWINA(cx if SR), PVI/Watchman, PFA, MANE/BSCI, GEN, MB, 3 Prep    CATARACT EXTRACTION W/  INTRAOCULAR LENS IMPLANT Left 4/24/2025    Procedure: EXTRACTION, CATARACT, WITH IOL INSERTION;  Surgeon: Jean Chow MD;  Location: [facility] OR;  Service: Ophthalmology;  Laterality: Left;    CATARACT EXTRACTION W/  INTRAOCULAR LENS IMPLANT Right 5/15/2025    Procedure: EXTRACTION, CATARACT, WITH IOL INSERTION;  Surgeon: Jean Chow MD;  Location: [facility] OR;  Service: Ophthalmology;  Laterality: Right;    CLOSURE OF LEFT ATRIAL APPENDAGE USING DEVICE N/A 5/29/2025    Procedure: Left atrial appendage closure device;  Surgeon: Pete Sheikh MD;  Location: [facility] EP LAB;  Service: Cardiology;  Laterality: N/A;    COLONOSCOPY N/A 2/16/2024    Procedure: COLONOSCOPY;  Surgeon: ALKA Arguelles MD;  Location: [facility] ENDO ([address] [place] [address]);  Service: Endoscopy;  Laterality: N/A;  Ref by Dr DINAA Copeland, Newly diagnosed AF-Pending Xarelto hold, PEG, portal - PC  ok to hold Xarelto 2 days per Dr Mcknight-GT  2/6/24- precall complete / Pt confirmed Xarelto holding ins.- ERW    ECHOCARDIOGRAM,TRANSESOPHAGEAL N/A 5/29/2025    Procedure: Transesophageal echo (EDWINA) intra-procedure log documentation;  Surgeon: Carin Ramírez MD;  Location: [facility] EP LAB;  Service: Cardiology;  Laterality: N/A;    EYE SURGERY  2005    ingrown eye lashes BL    WISDOM TOOTH EXTRACTION         Review of patient's allergies indicates:  No Known Allergies    No current facility-administered medications on file prior to encounter.     Current  Outpatient Medications on File Prior to Encounter   Medication Sig    alendronate (FOSAMAX) 70 MG tablet TAKE 1 TABLET BY MOUTH WEEKLY  WITH 8 OZ OF PLAIN WATER 30  MINUTES BEFORE FIRST FOOD, DRINK OR MEDS. STAY UPRIGHT FOR 30  MINS    azithromycin (ZITHROMAX) 500 MG tablet Take 1 tablet (500 mg total) by mouth once daily.    CALCIUM CARBONATE (CALCIUM 600 ORAL) Take by mouth.    cholecalciferol, vitamin D3, 125 mcg (5,000 unit) Tab Take 5,000 Units by mouth once daily.    CLOFAZIMINE ORAL Take 2 capsules by mouth once daily.    estradioL (ESTRACE) 0.01 % (0.1 mg/gram) vaginal cream 0.5 grams with applicator or dime-sized amount with finger in vagina nightly x 2 weeks, then twice a week thereafter    ethambutoL (MYAMBUTOL) 400 MG Tab Take 2 tablets (800 mg total) by mouth once daily.    metoprolol tartrate (LOPRESSOR) 25 MG tablet Take 1 tablet (25 mg total) by mouth daily as needed (Palpitations).    prednisolon/gatiflox/bromfenac (PREDNISOL ACE-GATIFLOX-BROMFEN) 1-0.5-0.075 % DrpS Apply 1 drop to eye 3 (three) times daily. in operative eye for 1 month after surgery     Family History       Problem Relation (Age of Onset)    Diabetes Mother, Brother    Heart disease Mother    Hypertension Mother, Brother, Brother    Kidney failure Mother    No Known Problems Son    Parkinsonism Father          Tobacco Use    Smoking status: Never    Smokeless tobacco: Never   Vaping Use    Vaping status: Never Used   Substance and Sexual Activity    Alcohol use: Not Currently     Alcohol/week: 1.0 standard drink of alcohol     Types: 1 Glasses of wine per week     Comment: occasionally- 3-4 a month    Drug use: No    Sexual activity: Yes     Partners: Male     Review of Systems   Constitutional: Negative for diaphoresis, malaise/fatigue, weight gain and weight loss.   HENT:  Negative for nosebleeds.    Eyes:  Negative for vision loss in left eye, vision loss in right eye and visual disturbance.   Cardiovascular:  Negative for chest  pain, claudication, dyspnea on exertion, irregular heartbeat, leg swelling, near-syncope, orthopnea, palpitations, paroxysmal nocturnal dyspnea and syncope.   Respiratory:  Negative for cough, shortness of breath, sleep disturbances due to breathing, snoring and wheezing.    Hematologic/Lymphatic: Negative for bleeding problem. Does not bruise/bleed easily.   Skin:  Negative for poor wound healing and rash.   Musculoskeletal:  Negative for muscle cramps and myalgias.   Gastrointestinal:  Negative for bloating, abdominal pain, diarrhea, heartburn, melena, nausea and vomiting.   Genitourinary:  Negative for hematuria and nocturia.   Neurological:  Negative for brief paralysis, dizziness, headaches, light-headedness, numbness and weakness.   Psychiatric/Behavioral:  Negative for depression.    Allergic/Immunologic: Negative for hives.     Objective:     Vital Signs (Most Recent):    Vital Signs (24h Range):           There is no height or weight on file to calculate BMI.            No intake or output data in the 24 hours ending 07/10/25 1016    Lines/Drains/Airways       None                    Physical Exam  Vitals and nursing note reviewed.   Constitutional:       Appearance: She is well-developed. She is not diaphoretic.   HENT:      Head: Normocephalic and atraumatic.   Eyes:      Conjunctiva/sclera: Conjunctivae normal.   Neck:      Vascular: No carotid bruit or JVD.   Cardiovascular:      Rate and Rhythm: Normal rate and regular rhythm.      Pulses: Normal pulses and intact distal pulses.      Heart sounds: Normal heart sounds. No murmur heard.     No friction rub. No gallop.   Pulmonary:      Effort: Pulmonary effort is normal.      Breath sounds: Normal breath sounds. No rales.   Chest:      Chest wall: No tenderness.   Abdominal:      General: There is no distension.      Palpations: Abdomen is soft. There is no mass.      Tenderness: There is no abdominal tenderness.   Skin:     General: Skin is warm and  dry.      Coloration: Skin is not pale.   Neurological:      Mental Status: She is alert and oriented to person, place, and time.          Significant Labs: All pertinent lab results from the last 24 hours have been reviewed.

## 2025-07-11 LAB
AORTIC SIZE INDEX (SOV): 2.4 CM/M2
AORTIC SIZE INDEX: 2.7 CM/M2
ASCENDING AORTA: 3.6 CM
BSA FOR ECHO PROCEDURE: 1.34 M2
EJECTION FRACTION: 55 %
SINUS: 3.2 CM
STJ: 2.7 CM

## 2025-07-28 NOTE — PROGRESS NOTES
Urogyn follow up  01/24/2025  .  [facility] UR[facility]  [address] [address]    Luly Lora  [id number]  1950      Luly Lora is a 74 y.o. here for a urogyn follow up for pessary check.    Last HPI from 01/23/2018     HPI:       Ohs Peq [id number]      Question 1/21/2018  8:07 PM CST   Do you...     Usually experience pressure in the lower abdomen? Symptoms not present   Usually experience heaviness or dullness in the pelvic area? Symptoms not present   Usually have a bulge or something falling out that you can see or feel in your vaginal area? Symptoms present and they bother me quite a bit   Ever have to push on the vagina or around the rectum to complete a bowel movement? Symptoms not present   Usually experience a feeling of incomplete bladder emptying? Symptoms present and they bother me somewhat   Ever have to push up on a bulge in the vaginal area with your fingers to start or complete urination? Symptoms not present   Do you...     Feel you need to strain too hard to have a bowel movement? Symptoms not present   Feel you have not completely emptied your bowels at the end of a bowel movement?  Symptoms present and they bother me somewhat   Usually lose stool beyond your control if your stool is well formed? Symptoms not present   Usually lose stool beyond your control if your stool is loose? Symptoms present and they bother me somewhat   Usually lose gas from your rectum beyond your control? Symptoms not present   Usually have pain when you pass your stool? Symptoms not present   Experience a strong sense of urgency and have to rush to the bathroom to have a bowel movement? Symptoms not present   Does part of your bowel ever pass through the rectum and bulge outside during or after a bowel movement? Symptoms present and they bother me somewhat   Do you...      Usually experience frequent urination? Symptoms present and they bother me somewhat   Usually experience urine leakage  associated with a feeling of urgency, that is, a strong sensation of needing to go to the bathroom? Symptoms present and they bother me somewhat   Usually experience urine leakage related to coughing, sneezing or laughing? Symptoms not present   Usually experience small amounts of urine leakage (that is, drops)? Symptoms present and they bother me somewhat   Usually experience difficulty emptying your bladder? Symptoms present and they bother me somewhat   Usually experience pain or discomfort in the lower abdomen or genital region? Symptoms present and they bother me somewhat   POPDI  (range: 0 - 100) 25   CRADI (range: 0 - 100) 18.75   IGNACIO (range: 0 - 100) 41.66   TOTAL SCORE  (range: 0 - 300) 85.41   Ohs Peq Urogyn Hpi      Question 1/21/2018  8:21 PM CST   General Urogynecology: Are you experiencing the following?     Dysuria (painful urination) No   Nocturia:  waking up at night to empty your bladder  Yes   If you answered yes to the previous question, how many times does this happen per night? 1-2   Enuresis (urine loss during sleep) No   Dribbling urine after you urinate Yes   Hematuria (urine appears red) No   Type of stream Interrupted   Urinary frequency: How often a day are you going to the bathroom per day?  Less than 10   Urinary Tract Infections: How many Urinary Tract Infections have you had in the past year? I have not had a UTI in the past year   If you have had a UTI in the past year, what treatments have you had so far?  I have not had a UTI in the past year   Urinary Incontinence (General): Are you experiencing the following?     Past consultation for incontinence: Have you ever seen someone for the evaluation of incontinence? No   If you answered yes to the previous question, please select all the therapies you have tried.  N/a- I answered no to the previous question   Please note the effectiveness of the therapies.     Need to wear protection to keep clothes dry  No   If you answered yes to the  "previous question, please kuldeep the protection you use.  N/a- I answered no to the previous question   If you wear protection, how much wetness is typically on each pad? N/a- I do not wear protection   If you wear protection, how often do you have to change per day, if applicable?      Stress Symptoms: Are you experiencing the following?     Leakage of urine with cough, laugh and/or sneeze No   If you answered yes to the previous question, what is the frequency in days, weeks and/or months? Never   Leakage of urine with sex No   Leakage of urine with bending/ lifting No   Leakage of urine with briskly walking or jogging No   If you lose urine for any other reason not previously mentioned, please note it below, if applicable.      Urge Symptoms: Are you experiencing the following?     Urgency ("got to go" feeling) Yes   Urge: How frequently do you feel an urge to urinate (feeling like you "gotta go" to the bathroom and can't wait) Several times a week   Do you experience a leakage of urine when you have a feeling of urgency?  No   Leakage of urine when unaware No   Past use of anticholinergics (medications used to treat overactive bladder) No   If you answered yes to the previous question, please kuldeep the anticholinergics you have used:      Have you ever used Mirbetriq (aka Mirabegron)?  No   Prolapse Symptoms: Are you experiencing any of the following?      Falling out/ Bulging/ Heaviness in the vagina (past opening) x years; wears tight underwear Yes   Vaginal/ Abdominal Pain/ Pressure Yes   Need to strain/ Push to void No   Need to wait on the toilet before you void No   Unusual position to urinate (using your hands to push back the vaginal bulge) No   Sensation of incomplete emptying Yes--has to PV/DV to help   Past use of pessary device No   If you answered yes to the previous question, please list the devices you have used below.      Bowel Symptoms: Are you experiencing any of the following?     Constipation " No   Diarrhea  No   Hematochezia (bloody stool) No   Incomplete evacuation of stool Yes   Involuntary loss of formed stool No   Fecal smearing/urgency Yes   Involuntary loss of gas No   Vaginal Symptoms: Are you experiencing any of the following?      Abnormal vaginal bleeding  No   Vaginal dryness Yes   Sexually active  Yes   Dyspareunia (painful intercourse) Yes   Estrogen use  No   Ohs Peq Pelvic Pain Urogyn      Question 1/21/2018  8:22 PM CST   Are you experiencing pelvic pain?  No      Patient Hx             02/14/2024  1)  UI:  (--) BERNARD   (--) UUI    (+) pantyliner:  Daytime frequency: Q 2-3 hours.  Nocturia: Yes: 2-3/night.   (--) dysuria,  (--) hematuria,  (--) frequent UTIs.  (+) complete bladder emptying.      2)  POP:  Absent with pessary in place.  Symptoms:(--)    (--) vaginal bleeding. (+) vaginal discharge--pink tinged. More with certain movements.   (+)/(--) sexually active--unable to have intercourse with pessary in place.  (+) dyspareunia.   (--)  Vaginal dryness.  (--) vaginal estrogen use. Tried estrogen cream but had trouble getting in.    --cannot remove pessary independently  --last removal 5 months ago.  --initial POP-Q (2018):  Aa 0; Ba 0; C +2; Ap 0; Bp 0; D -6.  Genital hiatus 3, perineal body 2, total vaginal length 11-12.      3)  BM:  (--) constipation/straining.  (--) chronic diarrhea. Consistency is sticky.  (--) hematochezia.  (--) fecal incontinence  (+) fecal smearing/urgency--sometimes sudden, without reason.  (--) incomplete evacuation.       4) pessary:  Denies pain or bleeding.  Scant pink discharge.  Using #3 ring with support.  Independent in use.          Changes from last visit: (last visit 04/25/2025)  1)  Stage 3 uterine prolapse:  --using  #3 ring + support   --denies pain or bleeding       2)  Vaginal atrophy (dryness):     --using vaginal estrogen cream                3)  Urinary urgency/post-void dribbling:  --rare UUI-- using 1-2 liner pads with minimal  wetness  --voiding every 2 hours during the day and 2-3/ night     4)Fecal smearing:  --improved since not eating dairy  --not taking fiber          Past Medical History:   Diagnosis Date    Anticoagulant long-term use     Atrial fibrillation     Brain bleed 1998    Osteoporosis     Pelvic fracture        Past Surgical History:   Procedure Laterality Date    ABLATION OF ARRHYTHMOGENIC FOCUS FOR ATRIAL FIBRILLATION N/A 5/29/2025    Procedure: Ablation atrial fibrillation;  Surgeon: Pete Sheikh MD;  Location: [facility] EP LAB;  Service: Cardiology;  Laterality: N/A;  AF, EDWINA(cx if SR), PVI/Watchman, PFA, MANE/BSCI, GEN, MB, 3 Prep    CATARACT EXTRACTION W/  INTRAOCULAR LENS IMPLANT Left 4/24/2025    Procedure: EXTRACTION, CATARACT, WITH IOL INSERTION;  Surgeon: Jean Chow MD;  Location: [facility] OR;  Service: Ophthalmology;  Laterality: Left;    CATARACT EXTRACTION W/  INTRAOCULAR LENS IMPLANT Right 5/15/2025    Procedure: EXTRACTION, CATARACT, WITH IOL INSERTION;  Surgeon: Jean Chow MD;  Location: [facility] OR;  Service: Ophthalmology;  Laterality: Right;    CLOSURE OF LEFT ATRIAL APPENDAGE USING DEVICE N/A 5/29/2025    Procedure: Left atrial appendage closure device;  Surgeon: Pete Sheikh MD;  Location: [facility] EP LAB;  Service: Cardiology;  Laterality: N/A;    COLONOSCOPY N/A 2/16/2024    Procedure: COLONOSCOPY;  Surgeon: ALKA Arguelles MD;  Location: [facility] ENDO [address]);  Service: Endoscopy;  Laterality: N/A;  Ref by Dr DIANA Copeland, Newly diagnosed AF-Pending Xarelto hold, PEG, portal - PC  ok to hold Xarelto 2 days per Dr Mcknight-GT  2/6/24- precall complete / Pt confirmed Xarelto holding ins.- ERW    ECHOCARDIOGRAM,TRANSESOPHAGEAL N/A 5/29/2025    Procedure: Transesophageal echo (EDWINA) intra-procedure log documentation;  Surgeon: Carin Ramírez MD;  Location: [facility] EP LAB;  Service: Cardiology;  Laterality: N/A;    ECHOCARDIOGRAM,TRANSESOPHAGEAL N/A 7/10/2025    Procedure: Transesophageal echo  (EDWINA) intra-procedure log documentation;  Surgeon: Provider, [facility];  Location: [facility] EP LAB;  Service: Cardiology;  Laterality: N/A;  AF, S/P Watchman EDWINA, MAC, 3 Prep    EYE SURGERY  2005    ingrown eye lashes BL    WISDOM TOOTH EXTRACTION         Family History   Problem Relation Name Age of Onset    Hypertension Mother      Heart disease Mother          MI age 77    Kidney failure Mother      Diabetes Mother      Parkinsonism Father      Hypertension Brother      Hypertension Brother      Diabetes Brother      No Known Problems Son      Breast cancer Neg Hx      Ovarian cancer Neg Hx      Cervical cancer Neg Hx      Endometrial cancer Neg Hx      Vaginal cancer Neg Hx      Asthma Neg Hx      Emphysema Neg Hx         Social History     Socioeconomic History    Marital status:    Occupational History    Occupation: Lab Supervisor   Tobacco Use    Smoking status: Never    Smokeless tobacco: Never   Vaping Use    Vaping status: Never Used   Substance and Sexual Activity    Alcohol use: Never     Alcohol/week: 1.0 standard drink of alcohol     Types: 1 Glasses of wine per week     Comment: occasionally- 3-4 a month    Drug use: No    Sexual activity: Yes     Partners: Male   Social History Narrative    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.      Research in Med School for years. - ophth and inf dz, studying antibodies.         Exercise:  Walking 3 days a week, swam in past, resistance machines' dumbbells, table tennis.          Chinese.  Emigrated 1988.         with CAD/CABG.  . OPP     Social Drivers of Health     Financial Resource Strain: Low Risk  (6/1/2025)    Overall Financial Resource Strain (CARDIA)     Difficulty of Paying Living Expenses: Not hard at all   Food Insecurity: No Food Insecurity (6/1/2025)    Hunger Vital Sign     Worried About Running Out of Food in the Last Year: Never true     Ran Out of Food in the Last Year: Never true    Transportation Needs: No Transportation Needs (6/1/2025)    PRAPARE - Transportation     Lack of Transportation (Medical): No     Lack of Transportation (Non-Medical): No   Physical Activity: Insufficiently Active (6/1/2025)    Exercise Vital Sign     Days of Exercise per Week: 2 days     Minutes of Exercise per Session: 30 min   Stress: No Stress Concern Present (6/1/2025)    [employer] [employer] - Occupational Stress Questionnaire     Feeling of Stress : Only a little   Housing Stability: Low Risk  (6/1/2025)    Housing Stability Vital Sign     Unable to Pay for Housing in the Last Year: No     Number of Times Moved in the Last Year: 0     Homeless in the Last Year: No       Current Outpatient Medications   Medication Sig Dispense Refill    alendronate (FOSAMAX) 70 MG tablet TAKE 1 TABLET BY MOUTH WEEKLY  WITH 8 OZ OF PLAIN WATER 30  MINUTES BEFORE FIRST FOOD, DRINK OR MEDS. STAY UPRIGHT FOR 30  MINS 12 tablet 3    aspirin (ECOTRIN) 81 MG EC tablet Take 1 tablet (81 mg total) by mouth once daily. 90 tablet 3    azithromycin (ZITHROMAX) 500 MG tablet Take 1 tablet (500 mg total) by mouth once daily. 30 tablet 5    CALCIUM CARBONATE (CALCIUM 600 ORAL) Take by mouth.      cholecalciferol, vitamin D3, 125 mcg (5,000 unit) Tab Take 5,000 Units by mouth once daily.      CLOFAZIMINE ORAL Take 2 capsules by mouth once daily.      clopidogreL (PLAVIX) 75 mg tablet Take 1 tablet (75 mg total) by mouth once daily. 30 tablet 5    estradioL (ESTRACE) 0.01 % (0.1 mg/gram) vaginal cream 0.5 grams with applicator or dime-sized amount with finger in vagina nightly x 2 weeks, then twice a week thereafter 42.5 g 11    ethambutoL (MYAMBUTOL) 400 MG Tab Take 2 tablets (800 mg total) by mouth once daily. 60 tablet 5    amoxicillin-clavulanate 875-125mg (AUGMENTIN) 875-125 mg per tablet Take 1 tablet by mouth 2 (two) times daily. (Patient not taking: Reported on 7/29/2025)      dicyclomine (BENTYL) 10 MG capsule  Take 10 mg by mouth. (Patient not taking: Reported on 7/29/2025)      metoprolol tartrate (LOPRESSOR) 25 MG tablet Take 1 tablet (25 mg total) by mouth daily as needed (Palpitations). 30 tablet 0    prednisolon/gatiflox/bromfenac (PREDNISOL ACE-GATIFLOX-BROMFEN) 1-0.5-0.075 % DrpS Apply 1 drop to eye 3 (three) times daily. in operative eye for 1 month after surgery (Patient not taking: Reported on 7/29/2025) 8 mL 3     No current facility-administered medications for this visit.       Review of patient's allergies indicates:  No Known Allergies    Well woman:  Pap test: 2016, normal.  History of abnormal paps: No.  History of STIs:  No  Mammogram: Date of last: 7/2024.  Result: Normal  Colonoscopy: Date of last: 2010.  Result:  normal.  Repeat due:  2020.  Scheduled for Fri 2-.  DEXA:  Date of last: 2022.  Result:  Osteoporosis, on meds.  Repeat due:  2022.    ROS:  As per HPI.      Exam  /71 (BP Location: Right arm, Patient Position: Sitting)   Pulse 69   Ht 5' (1.524 m)   Wt 43.7 kg (96 lb 5.5 oz)   BMI 18.82 kg/m²   General: alert and oriented, no acute distress  Respiratory: normal respiratory effort  Abd: soft, non-tender, non-distended    Pelvic  Ext. Genitalia: normal external genitalia. Normal bartholin's and skeens glands  Vagina: + atrophy. Normal vaginal mucosa without lesions. No discharge noted.   Non-tender bladder base without palpable mass.  #3 ring with support in palce  Cervix: no lesions  Uterus:  uterus is normal size, shape, consistency and nontender   Urethra: no masses or tenderness  Urethral meatus: no lesions, caruncle or prolapse.    Pessary removed without difficulty. Washed with soap and water. Reinserted without difficulty.       Impression  1. Uterovaginal prolapse        2. Vaginal atrophy        3. Pessary maintenance                  We reviewed the above issues and discussed options for short-term versus long-term management of her problems.   Plan:   1)  Stage 3  uterine prolapse:  --continue pessary: #3 ring + support   PESSARY CARE: Remove pessary as frequently as nightly and as infrequently as every 2-3 weeks.  Remove before intercourse.  May need to remove before bowel movements if straining dislodges.   Wash with soap and water (no ).  Replace using small amount of water-based lubricant (like KY jelly) at end being introduced into vagina.    --having more trouble removing/replacing independently              --RTC Q3-4 months for pessary checks for now     --if tires of pessary use and is done with working (can't take off time for surgery right now):  --surgical option: vaginal hysterectomy, uterosacral suspension, anterior/posterior repair  --would need ultrasound/bladder testing beforehand  --would need plan from PCP or cards to bridge off/back on xarelto and if needs injections while off (usually give heparin 5000 U SQ preop, then Q8h periop)  --would need clearance per PCP (Min) + labs (CBC, CMP, T&S)/EKG     2)  Vaginal atrophy (dryness):     --use Vaginal estrogen:  --Use 0.5 grams of estrogen cream in vagina with applicator or dime-sized amount with finger (as far as can reach internally) twice a week.  You can also apply a dime-sized amount with your finger around the vaginal opening and inner lips at same frequency.               --try to get finger above or beneath pessary ring                          --Vaginal estrogen may help to decrease pain related to dryness with intercourse and urinary symptoms (urgency/frequency/UTIs) around menopause.                 3)  Urinary urgency/post-void dribbling:  --continue pessary   --Empty bladder every 3 hours.  Empty well: wait a minute, lean forward on toilet.    --Avoid dietary irritants (see sheet).  Keep diary x 3-5 days to determine your irritants.  --KEGELS: do 10 in AM and 10 in PM, holding each x 10 seconds.  When you feel urge to go, STOP, KEGEL, and when urge has passed, then go to bathroom.   Consider PT in future.    --URGE: consider medication in future.  Takes 2-4 weeks to see if will have effect.  For dry mouth: get sour, sugar free lozenge or gum.       4) Elevated PVR:  --PVR improved with pessary and on  previous recheck     5)  Fecal smearing:  --hydrate well  --avoid dietary triggers     6)  RTC 3 months for pc        I spent a total of 20 minutes on the day of the visit.  This includes face to face time and non-face to face time preparing to see the patient (eg, review of tests), obtaining and/or reviewing separately obtained history, documenting clinical information in the electronic or other health record, independently interpreting results and communicating results to the patient/family/caregiver, or care coordinator.       Farheen Hopkins, SRI-BC  [facility]  Division of Female Pelvic Medicine and Reconstructive Surgery  Department of Obstetrics & Gynecology

## 2025-07-29 ENCOUNTER — OFFICE VISIT (OUTPATIENT)
Dept: UROGYNECOLOGY | Facility: CLINIC | Age: 75
End: 2025-07-29
Payer: COMMERCIAL

## 2025-07-29 VITALS
BODY MASS INDEX: 18.91 KG/M2 | DIASTOLIC BLOOD PRESSURE: 71 MMHG | SYSTOLIC BLOOD PRESSURE: 114 MMHG | HEIGHT: 60 IN | HEART RATE: 69 BPM | WEIGHT: 96.31 LBS

## 2025-07-29 DIAGNOSIS — N95.2 VAGINAL ATROPHY: ICD-10-CM

## 2025-07-29 DIAGNOSIS — N81.4 UTEROVAGINAL PROLAPSE: Primary | ICD-10-CM

## 2025-07-29 DIAGNOSIS — Z46.89 PESSARY MAINTENANCE: ICD-10-CM

## 2025-07-29 PROCEDURE — 3074F SYST BP LT 130 MM HG: CPT | Mod: CPTII,S$GLB,, | Performed by: NURSE PRACTITIONER

## 2025-07-29 PROCEDURE — 99213 OFFICE O/P EST LOW 20 MIN: CPT | Mod: S$GLB,,, | Performed by: NURSE PRACTITIONER

## 2025-07-29 PROCEDURE — 1159F MED LIST DOCD IN RCRD: CPT | Mod: CPTII,S$GLB,, | Performed by: NURSE PRACTITIONER

## 2025-07-29 PROCEDURE — 1101F PT FALLS ASSESS-DOCD LE1/YR: CPT | Mod: CPTII,S$GLB,, | Performed by: NURSE PRACTITIONER

## 2025-07-29 PROCEDURE — 3288F FALL RISK ASSESSMENT DOCD: CPT | Mod: CPTII,S$GLB,, | Performed by: NURSE PRACTITIONER

## 2025-07-29 PROCEDURE — 1126F AMNT PAIN NOTED NONE PRSNT: CPT | Mod: CPTII,S$GLB,, | Performed by: NURSE PRACTITIONER

## 2025-07-29 PROCEDURE — 1160F RVW MEDS BY RX/DR IN RCRD: CPT | Mod: CPTII,S$GLB,, | Performed by: NURSE PRACTITIONER

## 2025-07-29 PROCEDURE — 3078F DIAST BP <80 MM HG: CPT | Mod: CPTII,S$GLB,, | Performed by: NURSE PRACTITIONER

## 2025-07-29 PROCEDURE — 99999 PR PBB SHADOW E&M-EST. PATIENT-LVL IV: CPT | Mod: PBBFAC,,, | Performed by: NURSE PRACTITIONER

## 2025-07-29 PROCEDURE — 3008F BODY MASS INDEX DOCD: CPT | Mod: CPTII,S$GLB,, | Performed by: NURSE PRACTITIONER

## 2025-08-08 ENCOUNTER — OFFICE VISIT (OUTPATIENT)
Dept: OPHTHALMOLOGY | Facility: CLINIC | Age: 75
End: 2025-08-08
Payer: COMMERCIAL

## 2025-08-08 ENCOUNTER — CLINICAL SUPPORT (OUTPATIENT)
Dept: OPHTHALMOLOGY | Facility: CLINIC | Age: 75
End: 2025-08-08
Payer: COMMERCIAL

## 2025-08-08 DIAGNOSIS — Z96.1 PSEUDOPHAKIA OF BOTH EYES: ICD-10-CM

## 2025-08-08 DIAGNOSIS — H35.342 MACULAR HOLE, LEFT: Primary | ICD-10-CM

## 2025-08-08 DIAGNOSIS — H43.813 VITREOUS DEGENERATION OF BOTH EYES: ICD-10-CM

## 2025-08-08 DIAGNOSIS — H44.2A2 LEFT EYE AFFECTED BY DEGENERATIVE MYOPIA WITH CHOROIDAL NEOVASCULARIZATION: ICD-10-CM

## 2025-08-08 PROCEDURE — 99999 PR PBB SHADOW E&M-EST. PATIENT-LVL III: CPT | Mod: PBBFAC,,, | Performed by: OPHTHALMOLOGY

## 2025-08-08 NOTE — PROGRESS NOTES
HPI    Pt is here for a 2 month DFE/OCT regarding a macular hole in OS    Pt states no eye pain or discomfort. No flashes or floaters. Vision seems   stable. No other complaints.  Last edited by Percy Jaramillo MA on 8/8/2025  8:00 AM.         A/P    ICD-10-CM ICD-9-CM   1. Macular hole, left  H35.342 362.54   2. 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 Pseudophakia of both eyes  Z96.1 V43.1   4. Vitreous degeneration of both eyes  H43.813 379.21         1. Macular hole, left  2. 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 Pseudophakia of both eyes  Good lens position OU  Plan: Observation for now, likely limited prognosis visually with macula pathology    5. Vitreous degeneration of both eyes  Peripheral scarring vs lattice-like areas  No adolfo RT/RD noted   Plan: Observation    Pathology of PVD, Retinal Tear, Retinal Detachment reviewed in great detail  RD precautions discussed in detail, patient expressed understanding  RTC immediately PRN (especially ANY change flashes, floaters, vision, visual field)       RTC 6 mo DFE/OCTm OU         I saw and examined the patient and reviewed in detail the findings documented. The final examination findings, image interpretations which have been independently interpreted, and plan as documented in the record represent my personal judgment and  conclusions.    Jimi Mitchell MD  Vitreoretinal Surgery   [facility]

## 2025-08-13 DIAGNOSIS — Z12.31 OTHER SCREENING MAMMOGRAM: ICD-10-CM

## 2025-08-26 ENCOUNTER — HOSPITAL ENCOUNTER (OUTPATIENT)
Dept: CARDIOLOGY | Facility: CLINIC | Age: 75
Discharge: HOME | End: 2025-08-26
Payer: COMMERCIAL

## 2025-08-26 ENCOUNTER — OFFICE VISIT (OUTPATIENT)
Dept: ELECTROPHYSIOLOGY | Facility: CLINIC | Age: 75
End: 2025-08-26
Payer: COMMERCIAL

## 2025-08-26 VITALS
WEIGHT: 95.44 LBS | HEART RATE: 60 BPM | HEIGHT: 60 IN | SYSTOLIC BLOOD PRESSURE: 116 MMHG | DIASTOLIC BLOOD PRESSURE: 72 MMHG | BODY MASS INDEX: 18.74 KG/M2

## 2025-08-26 DIAGNOSIS — I48.0 PAROXYSMAL ATRIAL FIBRILLATION: ICD-10-CM

## 2025-08-26 DIAGNOSIS — Z95.818 PRESENCE OF WATCHMAN LEFT ATRIAL APPENDAGE CLOSURE DEVICE: ICD-10-CM

## 2025-08-26 DIAGNOSIS — I48.0 PAF (PAROXYSMAL ATRIAL FIBRILLATION): Primary | ICD-10-CM

## 2025-08-26 LAB
OHS QRS DURATION: 82 MS
OHS QTC CALCULATION: 460 MS

## 2025-08-26 PROCEDURE — 99214 OFFICE O/P EST MOD 30 MIN: CPT | Mod: S$GLB,,, | Performed by: INTERNAL MEDICINE

## 2025-08-26 PROCEDURE — 1159F MED LIST DOCD IN RCRD: CPT | Mod: CPTII,S$GLB,, | Performed by: INTERNAL MEDICINE

## 2025-08-26 PROCEDURE — 3008F BODY MASS INDEX DOCD: CPT | Mod: CPTII,S$GLB,, | Performed by: INTERNAL MEDICINE

## 2025-08-26 PROCEDURE — 3288F FALL RISK ASSESSMENT DOCD: CPT | Mod: CPTII,S$GLB,, | Performed by: INTERNAL MEDICINE

## 2025-08-26 PROCEDURE — 1101F PT FALLS ASSESS-DOCD LE1/YR: CPT | Mod: CPTII,S$GLB,, | Performed by: INTERNAL MEDICINE

## 2025-08-26 PROCEDURE — 93010 ELECTROCARDIOGRAM REPORT: CPT | Mod: S$GLB,,, | Performed by: INTERNAL MEDICINE

## 2025-08-26 PROCEDURE — 99999 PR PBB SHADOW E&M-EST. PATIENT-LVL IV: CPT | Mod: PBBFAC,,, | Performed by: INTERNAL MEDICINE

## 2025-08-26 PROCEDURE — 3074F SYST BP LT 130 MM HG: CPT | Mod: CPTII,S$GLB,, | Performed by: INTERNAL MEDICINE

## 2025-08-26 PROCEDURE — 3078F DIAST BP <80 MM HG: CPT | Mod: CPTII,S$GLB,, | Performed by: INTERNAL MEDICINE

## 2025-08-26 PROCEDURE — G2211 COMPLEX E/M VISIT ADD ON: HCPCS | Mod: S$GLB,,, | Performed by: INTERNAL MEDICINE

## 2025-08-26 PROCEDURE — 1126F AMNT PAIN NOTED NONE PRSNT: CPT | Mod: CPTII,S$GLB,, | Performed by: INTERNAL MEDICINE

## 2025-09-03 ENCOUNTER — HOSPITAL ENCOUNTER (OUTPATIENT)
Dept: RADIOLOGY | Facility: OTHER | Age: 75
Discharge: HOME | End: 2025-09-03
Attending: INTERNAL MEDICINE
Payer: COMMERCIAL

## 2025-09-03 VITALS — BODY MASS INDEX: 18.65 KG/M2 | HEIGHT: 60 IN | WEIGHT: 95 LBS

## 2025-09-03 DIAGNOSIS — Z12.31 OTHER SCREENING MAMMOGRAM: ICD-10-CM

## 2025-09-03 PROCEDURE — 77063 BREAST TOMOSYNTHESIS BI: CPT | Mod: TC

## (undated) DEVICE — SYS WATCHMAN FXD CURVE DBL US

## (undated) DEVICE — CATH ANGIO DIAG PIG 6FX110

## (undated) DEVICE — INTRODUCER HEMOSTASIS 6.5FR

## (undated) DEVICE — INTRO SWARTZ SL2 8.5FR 63CM

## (undated) DEVICE — KIT PROBE COVER WITH GEL

## (undated) DEVICE — GUIDEWIRE ROSEN VAS JTIP 180CM

## (undated) DEVICE — GUIDEWIRE AMPLATZ XSTIFF 260CM

## (undated) DEVICE — SUT PERCLOSE PROSTYLE MEDIATE

## (undated) DEVICE — Device

## (undated) DEVICE — CATH ABLATION PULS FIELD 31MM

## (undated) DEVICE — SOL BETADINE 5%

## (undated) DEVICE — R CATH BIDIRECTIONL DF CRV 7FR

## (undated) DEVICE — SET INTRO PERFRMR SHTH 16FR

## (undated) DEVICE — KIT CUSTOM MANIFOLD

## (undated) DEVICE — GUIDEWIRE AMPLATZ XSTIFF 180CM

## (undated) DEVICE — VISIPAQUE CONTRAST 320MG/100ML

## (undated) DEVICE — LEFT ATRIAL APPENDAGE CLOSURE DEVICE WITH DELIVERY SYSTEM
Type: IMPLANTABLE DEVICE | Site: HEART | Status: NON-FUNCTIONAL
Brand: WATCHMAN FLX™ PRO
Removed: 2025-05-29

## (undated) DEVICE — PACK EP DRAPE OMC

## (undated) DEVICE — INTRO FAST-CATH SL1 8.5FR 63CM

## (undated) DEVICE — GLOVE SENSICARE PI SURG 7.5

## (undated) DEVICE — DILATOR COONS TAPER 14FR

## (undated) DEVICE — SET INTRO PERFORMER 18FR 13CM

## (undated) DEVICE — INTRODUCER HEMOSTASIS 7.5F

## (undated) DEVICE — PAD DEFIB CADENCE ADULT R2

## (undated) DEVICE — SHEATH STEERABLE CLEAR

## (undated) DEVICE — SPIKE SHORT LG BORE 1-WAY 2IN

## (undated) DEVICE — SOL POVIDONE SCRUB IODINE 4 OZ

## (undated) DEVICE — KIT ENSITE ELECTRODE SURFACE

## (undated) DEVICE — NDL TRNSSPTL BRK-1 18GA 71CM

## (undated) DEVICE — CATH MAP BI-D HD SENSOR ENABLE

## (undated) DEVICE — R CATH ACUSON ACUNAV 8FR